# Patient Record
Sex: FEMALE | Race: WHITE | NOT HISPANIC OR LATINO | Employment: OTHER | URBAN - METROPOLITAN AREA
[De-identification: names, ages, dates, MRNs, and addresses within clinical notes are randomized per-mention and may not be internally consistent; named-entity substitution may affect disease eponyms.]

---

## 2017-04-27 ENCOUNTER — INFUSION (OUTPATIENT)
Dept: INFUSION THERAPY | Facility: HOSPITAL | Age: 77
End: 2017-04-27
Attending: INTERNAL MEDICINE
Payer: MEDICARE

## 2017-04-27 VITALS
DIASTOLIC BLOOD PRESSURE: 73 MMHG | SYSTOLIC BLOOD PRESSURE: 148 MMHG | HEIGHT: 66 IN | HEART RATE: 66 BPM | WEIGHT: 145 LBS | BODY MASS INDEX: 23.3 KG/M2

## 2017-04-27 DIAGNOSIS — R50.9 ACUTE FEBRILE ILLNESS: Primary | ICD-10-CM

## 2017-04-27 PROCEDURE — 96365 THER/PROPH/DIAG IV INF INIT: CPT

## 2017-04-27 PROCEDURE — 63600175 PHARM REV CODE 636 W HCPCS: Performed by: INTERNAL MEDICINE

## 2017-04-27 RX ADMIN — Medication 1000 MG: at 03:04

## 2017-10-25 ENCOUNTER — HOSPITAL ENCOUNTER (INPATIENT)
Facility: HOSPITAL | Age: 77
LOS: 2 days | Discharge: HOME | DRG: 871 | End: 2017-10-27
Attending: EMERGENCY MEDICINE | Admitting: HOSPITALIST
Payer: MEDICARE

## 2017-10-25 DIAGNOSIS — A41.9 SEVERE SEPSIS: ICD-10-CM

## 2017-10-25 DIAGNOSIS — J18.9 PNEUMONIA OF RIGHT LOWER LOBE DUE TO INFECTIOUS ORGANISM: Primary | ICD-10-CM

## 2017-10-25 DIAGNOSIS — T14.90XA TRAUMA: ICD-10-CM

## 2017-10-25 DIAGNOSIS — R65.20 SEVERE SEPSIS: ICD-10-CM

## 2017-10-25 DIAGNOSIS — R41.82 ALTERED MENTAL STATUS: ICD-10-CM

## 2017-10-25 PROBLEM — K21.9 GERD WITHOUT ESOPHAGITIS: Status: ACTIVE | Noted: 2017-10-25

## 2017-10-25 PROBLEM — G89.29 OTHER CHRONIC PAIN: Status: ACTIVE | Noted: 2017-10-25

## 2017-10-25 PROBLEM — F03.90 DEMENTIA WITHOUT BEHAVIORAL DISTURBANCE: Status: ACTIVE | Noted: 2017-10-25

## 2017-10-25 PROBLEM — I10 ESSENTIAL HYPERTENSION: Status: ACTIVE | Noted: 2017-10-25

## 2017-10-25 PROBLEM — Z72.0 TOBACCO ABUSE: Status: ACTIVE | Noted: 2017-10-25

## 2017-10-25 PROBLEM — G25.81 RESTLESS LEG SYNDROME, CONTROLLED: Status: ACTIVE | Noted: 2017-10-25

## 2017-10-25 LAB
ALBUMIN SERPL BCP-MCNC: 3.4 G/DL
ALP SERPL-CCNC: 84 U/L
ALT SERPL W/O P-5'-P-CCNC: 7 U/L
ANION GAP SERPL CALC-SCNC: 12 MMOL/L
AST SERPL-CCNC: 12 U/L
BACTERIA #/AREA URNS HPF: NORMAL /HPF
BASOPHILS # BLD AUTO: 0.01 K/UL
BASOPHILS NFR BLD: 0.2 %
BILIRUB SERPL-MCNC: 0.4 MG/DL
BILIRUB UR QL STRIP: NEGATIVE
BUN SERPL-MCNC: 26 MG/DL
CALCIUM SERPL-MCNC: 9.5 MG/DL
CHLORIDE SERPL-SCNC: 108 MMOL/L
CLARITY UR: CLEAR
CO2 SERPL-SCNC: 26 MMOL/L
COLOR UR: YELLOW
CREAT SERPL-MCNC: 0.8 MG/DL
DIFFERENTIAL METHOD: ABNORMAL
EOSINOPHIL # BLD AUTO: 0 K/UL
EOSINOPHIL NFR BLD: 0.3 %
ERYTHROCYTE [DISTWIDTH] IN BLOOD BY AUTOMATED COUNT: 13.7 %
EST. GFR  (AFRICAN AMERICAN): >60 ML/MIN/1.73 M^2
EST. GFR  (NON AFRICAN AMERICAN): >60 ML/MIN/1.73 M^2
GLUCOSE SERPL-MCNC: 128 MG/DL
GLUCOSE UR QL STRIP: NEGATIVE
HCT VFR BLD AUTO: 41.1 %
HGB BLD-MCNC: 13.8 G/DL
HGB UR QL STRIP: ABNORMAL
KETONES UR QL STRIP: NEGATIVE
LACTATE SERPL-SCNC: 2.8 MMOL/L
LEUKOCYTE ESTERASE UR QL STRIP: NEGATIVE
LYMPHOCYTES # BLD AUTO: 0.3 K/UL
LYMPHOCYTES NFR BLD: 5.6 %
MCH RBC QN AUTO: 29.5 PG
MCHC RBC AUTO-ENTMCNC: 33.6 G/DL
MCV RBC AUTO: 88 FL
MICROSCOPIC COMMENT: NORMAL
MONOCYTES # BLD AUTO: 0.5 K/UL
MONOCYTES NFR BLD: 9.4 %
NEUTROPHILS # BLD AUTO: 4.8 K/UL
NEUTROPHILS NFR BLD: 83.8 %
NITRITE UR QL STRIP: NEGATIVE
PH UR STRIP: 5 [PH] (ref 5–8)
PLATELET # BLD AUTO: 174 K/UL
PMV BLD AUTO: 10.4 FL
POTASSIUM SERPL-SCNC: 3.3 MMOL/L
PROT SERPL-MCNC: 6.8 G/DL
PROT UR QL STRIP: NEGATIVE
RBC # BLD AUTO: 4.68 M/UL
RBC #/AREA URNS HPF: 0 /HPF (ref 0–4)
SODIUM SERPL-SCNC: 146 MMOL/L
SP GR UR STRIP: 1.02 (ref 1–1.03)
SQUAMOUS #/AREA URNS HPF: 2 /HPF
TROPONIN I SERPL DL<=0.01 NG/ML-MCNC: 0.02 NG/ML
URN SPEC COLLECT METH UR: ABNORMAL
UROBILINOGEN UR STRIP-ACNC: NEGATIVE EU/DL
WBC # BLD AUTO: 5.76 K/UL
WBC #/AREA URNS HPF: 2 /HPF (ref 0–5)

## 2017-10-25 PROCEDURE — 84484 ASSAY OF TROPONIN QUANT: CPT

## 2017-10-25 PROCEDURE — 25000242 PHARM REV CODE 250 ALT 637 W/ HCPCS: Performed by: HOSPITALIST

## 2017-10-25 PROCEDURE — 99285 EMERGENCY DEPT VISIT HI MDM: CPT | Mod: 25

## 2017-10-25 PROCEDURE — 81000 URINALYSIS NONAUTO W/SCOPE: CPT

## 2017-10-25 PROCEDURE — 25000003 PHARM REV CODE 250: Performed by: EMERGENCY MEDICINE

## 2017-10-25 PROCEDURE — 80053 COMPREHEN METABOLIC PANEL: CPT

## 2017-10-25 PROCEDURE — 83605 ASSAY OF LACTIC ACID: CPT

## 2017-10-25 PROCEDURE — 96365 THER/PROPH/DIAG IV INF INIT: CPT

## 2017-10-25 PROCEDURE — 87040 BLOOD CULTURE FOR BACTERIA: CPT

## 2017-10-25 PROCEDURE — 85025 COMPLETE CBC W/AUTO DIFF WBC: CPT

## 2017-10-25 PROCEDURE — 96361 HYDRATE IV INFUSION ADD-ON: CPT

## 2017-10-25 PROCEDURE — 63600175 PHARM REV CODE 636 W HCPCS: Performed by: EMERGENCY MEDICINE

## 2017-10-25 PROCEDURE — 12000002 HC ACUTE/MED SURGE SEMI-PRIVATE ROOM

## 2017-10-25 PROCEDURE — A4216 STERILE WATER/SALINE, 10 ML: HCPCS | Performed by: EMERGENCY MEDICINE

## 2017-10-25 PROCEDURE — 25000003 PHARM REV CODE 250: Performed by: HOSPITALIST

## 2017-10-25 PROCEDURE — S4991 NICOTINE PATCH NONLEGEND: HCPCS | Performed by: HOSPITALIST

## 2017-10-25 RX ORDER — TRAZODONE HYDROCHLORIDE 50 MG/1
50 TABLET ORAL NIGHTLY
COMMUNITY
End: 2019-01-13

## 2017-10-25 RX ORDER — ENOXAPARIN SODIUM 100 MG/ML
40 INJECTION SUBCUTANEOUS EVERY 24 HOURS
Status: DISCONTINUED | OUTPATIENT
Start: 2017-10-25 | End: 2017-10-27 | Stop reason: HOSPADM

## 2017-10-25 RX ORDER — DONEPEZIL HYDROCHLORIDE 10 MG/1
10 TABLET, FILM COATED ORAL NIGHTLY
Status: DISCONTINUED | OUTPATIENT
Start: 2017-10-25 | End: 2017-10-27 | Stop reason: HOSPADM

## 2017-10-25 RX ORDER — TRAZODONE HYDROCHLORIDE 50 MG/1
50 TABLET ORAL NIGHTLY
Status: DISCONTINUED | OUTPATIENT
Start: 2017-10-25 | End: 2017-10-27 | Stop reason: HOSPADM

## 2017-10-25 RX ORDER — GLYCOPYRROLATE 1 MG/1
1 TABLET ORAL 3 TIMES DAILY
Status: ON HOLD | COMMUNITY
End: 2017-10-27 | Stop reason: HOSPADM

## 2017-10-25 RX ORDER — MORPHINE SULFATE 10 MG/ML
2 INJECTION INTRAMUSCULAR; INTRAVENOUS; SUBCUTANEOUS EVERY 4 HOURS PRN
Status: DISCONTINUED | OUTPATIENT
Start: 2017-10-25 | End: 2017-10-27 | Stop reason: HOSPADM

## 2017-10-25 RX ORDER — PANTOPRAZOLE SODIUM 40 MG/1
40 TABLET, DELAYED RELEASE ORAL DAILY
Status: DISCONTINUED | OUTPATIENT
Start: 2017-10-25 | End: 2017-10-27 | Stop reason: HOSPADM

## 2017-10-25 RX ORDER — IBUPROFEN 200 MG
1 TABLET ORAL DAILY
Status: DISCONTINUED | OUTPATIENT
Start: 2017-10-25 | End: 2017-10-27 | Stop reason: HOSPADM

## 2017-10-25 RX ORDER — CETIRIZINE HYDROCHLORIDE 10 MG/1
10 TABLET ORAL DAILY
Status: DISCONTINUED | OUTPATIENT
Start: 2017-10-25 | End: 2017-10-27 | Stop reason: HOSPADM

## 2017-10-25 RX ORDER — TIZANIDINE 4 MG/1
4 TABLET ORAL 3 TIMES DAILY PRN
Status: DISCONTINUED | OUTPATIENT
Start: 2017-10-25 | End: 2017-10-27 | Stop reason: HOSPADM

## 2017-10-25 RX ORDER — ROPINIROLE 0.25 MG/1
0.25 TABLET, FILM COATED ORAL 2 TIMES DAILY
Status: DISCONTINUED | OUTPATIENT
Start: 2017-10-25 | End: 2017-10-27 | Stop reason: HOSPADM

## 2017-10-25 RX ORDER — ALPRAZOLAM 0.5 MG/1
1 TABLET ORAL 2 TIMES DAILY
Status: DISCONTINUED | OUTPATIENT
Start: 2017-10-25 | End: 2017-10-27 | Stop reason: HOSPADM

## 2017-10-25 RX ORDER — POTASSIUM CHLORIDE 20 MEQ/1
40 TABLET, EXTENDED RELEASE ORAL ONCE
Status: COMPLETED | OUTPATIENT
Start: 2017-10-25 | End: 2017-10-25

## 2017-10-25 RX ORDER — CALCIUM CARBONATE 500(1250)
1 TABLET ORAL DAILY
COMMUNITY

## 2017-10-25 RX ORDER — ATORVASTATIN CALCIUM 10 MG/1
10 TABLET, FILM COATED ORAL DAILY
Status: DISCONTINUED | OUTPATIENT
Start: 2017-10-25 | End: 2017-10-27 | Stop reason: HOSPADM

## 2017-10-25 RX ORDER — AMLODIPINE BESYLATE 5 MG/1
5 TABLET ORAL DAILY
COMMUNITY
End: 2020-12-16 | Stop reason: CLARIF

## 2017-10-25 RX ORDER — ALENDRONATE SODIUM 70 MG/1
70 TABLET ORAL
COMMUNITY

## 2017-10-25 RX ORDER — FLUTICASONE PROPIONATE 50 MCG
1 SPRAY, SUSPENSION (ML) NASAL DAILY
Status: DISCONTINUED | OUTPATIENT
Start: 2017-10-25 | End: 2017-10-27 | Stop reason: HOSPADM

## 2017-10-25 RX ORDER — LORATADINE 10 MG/1
10 TABLET ORAL DAILY
COMMUNITY
End: 2020-11-09 | Stop reason: CLARIF

## 2017-10-25 RX ORDER — SODIUM CHLORIDE 0.9 % (FLUSH) 0.9 %
3 SYRINGE (ML) INJECTION EVERY 8 HOURS
Status: DISCONTINUED | OUTPATIENT
Start: 2017-10-25 | End: 2017-10-27 | Stop reason: HOSPADM

## 2017-10-25 RX ORDER — ESCITALOPRAM OXALATE 10 MG/1
10 TABLET ORAL DAILY
Status: DISCONTINUED | OUTPATIENT
Start: 2017-10-25 | End: 2017-10-27 | Stop reason: HOSPADM

## 2017-10-25 RX ORDER — CALCIUM CARBONATE 500(1250)
500 TABLET ORAL DAILY
Status: DISCONTINUED | OUTPATIENT
Start: 2017-10-25 | End: 2017-10-27 | Stop reason: HOSPADM

## 2017-10-25 RX ORDER — HYDROCODONE BITARTRATE AND ACETAMINOPHEN 10; 325 MG/1; MG/1
1 TABLET ORAL EVERY 4 HOURS PRN
Status: DISCONTINUED | OUTPATIENT
Start: 2017-10-25 | End: 2017-10-27 | Stop reason: HOSPADM

## 2017-10-25 RX ORDER — MEMANTINE HYDROCHLORIDE 5 MG/1
5 TABLET ORAL DAILY
Status: DISCONTINUED | OUTPATIENT
Start: 2017-10-25 | End: 2017-10-27 | Stop reason: HOSPADM

## 2017-10-25 RX ORDER — IBUPROFEN 400 MG/1
400 TABLET ORAL EVERY 6 HOURS PRN
Status: DISCONTINUED | OUTPATIENT
Start: 2017-10-25 | End: 2017-10-27 | Stop reason: HOSPADM

## 2017-10-25 RX ORDER — ATORVASTATIN CALCIUM 10 MG/1
20 TABLET, FILM COATED ORAL DAILY
COMMUNITY

## 2017-10-25 RX ORDER — ACETAMINOPHEN 500 MG
1000 TABLET ORAL
Status: COMPLETED | OUTPATIENT
Start: 2017-10-25 | End: 2017-10-25

## 2017-10-25 RX ORDER — IBUPROFEN 800 MG/1
800 TABLET ORAL EVERY 6 HOURS PRN
Status: ON HOLD | COMMUNITY
End: 2017-10-27 | Stop reason: HOSPADM

## 2017-10-25 RX ORDER — GABAPENTIN 300 MG/1
300 CAPSULE ORAL DAILY
Status: DISCONTINUED | OUTPATIENT
Start: 2017-10-25 | End: 2017-10-27 | Stop reason: HOSPADM

## 2017-10-25 RX ORDER — FLUTICASONE PROPIONATE 50 MCG
1 SPRAY, SUSPENSION (ML) NASAL DAILY
COMMUNITY
End: 2020-11-09 | Stop reason: CLARIF

## 2017-10-25 RX ADMIN — CALCIUM 500 MG: 500 TABLET ORAL at 03:10

## 2017-10-25 RX ADMIN — NICOTINE 1 PATCH: 21 PATCH, EXTENDED RELEASE TRANSDERMAL at 03:10

## 2017-10-25 RX ADMIN — POTASSIUM CHLORIDE 40 MEQ: 1500 TABLET, EXTENDED RELEASE ORAL at 03:10

## 2017-10-25 RX ADMIN — ACETAMINOPHEN 1000 MG: 500 TABLET ORAL at 10:10

## 2017-10-25 RX ADMIN — ESCITALOPRAM OXALATE 10 MG: 10 TABLET ORAL at 03:10

## 2017-10-25 RX ADMIN — CETIRIZINE HYDROCHLORIDE 10 MG: 10 TABLET, FILM COATED ORAL at 03:10

## 2017-10-25 RX ADMIN — TRAZODONE HYDROCHLORIDE 50 MG: 50 TABLET ORAL at 09:10

## 2017-10-25 RX ADMIN — GABAPENTIN 300 MG: 300 CAPSULE ORAL at 03:10

## 2017-10-25 RX ADMIN — ALPRAZOLAM 1 MG: 0.5 TABLET ORAL at 09:10

## 2017-10-25 RX ADMIN — DONEPEZIL HYDROCHLORIDE 10 MG: 10 TABLET, FILM COATED ORAL at 09:10

## 2017-10-25 RX ADMIN — ENOXAPARIN SODIUM 40 MG: 100 INJECTION SUBCUTANEOUS at 04:10

## 2017-10-25 RX ADMIN — ROPINIROLE HYDROCHLORIDE 0.25 MG: 0.25 TABLET, FILM COATED ORAL at 09:10

## 2017-10-25 RX ADMIN — HYDROCODONE BITARTRATE AND ACETAMINOPHEN 1 TABLET: 10; 325 TABLET ORAL at 03:10

## 2017-10-25 RX ADMIN — MEMANTINE HYDROCHLORIDE 5 MG: 5 TABLET ORAL at 03:10

## 2017-10-25 RX ADMIN — ATORVASTATIN CALCIUM 10 MG: 10 TABLET, FILM COATED ORAL at 03:10

## 2017-10-25 RX ADMIN — PANTOPRAZOLE SODIUM 40 MG: 40 TABLET, DELAYED RELEASE ORAL at 03:10

## 2017-10-25 RX ADMIN — SODIUM CHLORIDE 1000 ML: 0.9 INJECTION, SOLUTION INTRAVENOUS at 10:10

## 2017-10-25 RX ADMIN — CEFTRIAXONE 1 G: 1 INJECTION, SOLUTION INTRAVENOUS at 11:10

## 2017-10-25 RX ADMIN — SODIUM CHLORIDE, PRESERVATIVE FREE 3 ML: 5 INJECTION INTRAVENOUS at 09:10

## 2017-10-25 RX ADMIN — AZITHROMYCIN 500 MG: 500 INJECTION, POWDER, LYOPHILIZED, FOR SOLUTION INTRAVENOUS at 11:10

## 2017-10-25 RX ADMIN — FLUTICASONE PROPIONATE 1 SPRAY: 50 SPRAY, METERED NASAL at 03:10

## 2017-10-25 RX ADMIN — SODIUM CHLORIDE, PRESERVATIVE FREE 3 ML: 5 INJECTION INTRAVENOUS at 03:10

## 2017-10-25 NOTE — PLAN OF CARE
10/25/17 1732   Discharge Assessment   Assessment Type Discharge Planning Assessment   Confirmed/corrected address and phone number on facesheet? Yes   Assessment information obtained from? Patient   Prior to hospitilization cognitive status: Alert/Oriented   Prior to hospitalization functional status: Needs Assistance  (Patient is Blind)   Current cognitive status: Alert/Oriented   Current Functional Status: Needs Assistance;Assistive Equipment   Lives With spouse  (sister-in-law)   Able to Return to Prior Arrangements yes   Is patient able to care for self after discharge? Yes   Who are your caregiver(s) and their phone number(s)? Carlie Rodriguez and Spouse Tim Fallon   Patient's perception of discharge disposition home or selfcare   Readmission Within The Last 30 Days no previous admission in last 30 days   Patient currently being followed by outpatient case management? No   Patient currently receives any other outside agency services? No   Equipment Currently Used at Home none   Do you have any problems affording any of your prescribed medications? Yes   Is the patient taking medications as prescribed? yes   Does the patient have transportation home? Yes   Transportation Available family or friend will provide   Does the patient receive services at the [facility]? No   Discharge Plan A Home with family   Discharge Plan B Home with family   Patient/Family In Agreement With Plan yes     PCP: Willa Friedman    Patient prefers afternoon appointments.

## 2017-10-25 NOTE — ASSESSMENT & PLAN NOTE
Influenza swab  Appears to be secondary to pneumonia  Difficult to decipher if neck pain is anything but old, chronic findings, if neuro status does not improve and fever persistent will consider LP  Await urine and blood culture   IV antibiotics

## 2017-10-25 NOTE — SUBJECTIVE & OBJECTIVE
Past Medical History:   Diagnosis Date    Depression     GERD (gastroesophageal reflux disease)     History of radiofrequency ablation procedure for cardiac arrhythmia     Hypertension     Memory loss     Osteoporosis     Palpitations        Past Surgical History:   Procedure Laterality Date    APPENDECTOMY       SECTION      EYE SURGERY      HIP SURGERY      HYSTERECTOMY      ROTATOR CUFF REPAIR      L arm    TONSILLECTOMY         Review of patient's allergies indicates:  No Known Allergies    No current facility-administered medications on file prior to encounter.      Current Outpatient Prescriptions on File Prior to Encounter   Medication Sig    donepezil (ARICEPT) 10 MG tablet Take 10 mg by mouth every evening.    gabapentin (NEURONTIN) 100 MG capsule Take 300 mg by mouth once daily.     hydrocodone-acetaminophen 10-325mg (NORCO)  mg Tab Take by mouth.    omeprazole (PRILOSEC) 20 MG capsule Take 20 mg by mouth once daily.    ropinirole (REQUIP) 0.25 MG tablet Take 0.25 mg by mouth 2 (two) times daily.    tizanidine (ZANAFLEX) 4 MG tablet Take 4 mg by mouth every 6 (six) hours as needed.    alprazolam (XANAX) 1 MG tablet Take 1 mg by mouth 2 (two) times daily.    ergocalciferol (ERGOCALCIFEROL) 50,000 unit Cap Take 50,000 Units by mouth every 7 days.    escitalopram oxalate (LEXAPRO) 10 MG tablet Take 10 mg by mouth once daily.    memantine (NAMENDA) 5 MG Tab Take 5 mg by mouth once daily.    metoprolol succinate (TOPROL-XL) 25 MG 24 hr tablet Take 25 mg by mouth once daily.    oxybutynin (DITROPAN) 5 MG Tab Take 5 mg by mouth 2 (two) times daily.     Family History     None        Social History Main Topics    Smoking status: Current Every Day Smoker     Packs/day: 0.50     Types: Cigarettes    Smokeless tobacco: Never Used    Alcohol use No    Drug use: No    Sexual activity: Not on file     Review of Systems   Constitutional: Positive for activity change, appetite  change and fatigue.   HENT: Negative.    Eyes: Positive for visual disturbance.   Respiratory: Negative.    Cardiovascular: Negative.    Gastrointestinal: Positive for nausea and vomiting.   Endocrine: Negative.    Genitourinary: Negative.    Musculoskeletal: Positive for arthralgias, back pain and neck pain.   Skin: Negative.    Neurological: Positive for dizziness, speech difficulty, weakness and light-headedness.   Psychiatric/Behavioral: Positive for confusion.     Objective:     Vital Signs (Most Recent):  Temp: 99 °F (37.2 °C) (10/25/17 1325)  Pulse: 77 (10/25/17 1325)  Resp: 18 (10/25/17 1325)  BP: (!) 117/57 (10/25/17 1325)  SpO2: (!) 93 % (10/25/17 1325) Vital Signs (24h Range):  Temp:  [99 °F (37.2 °C)-101.8 °F (38.8 °C)] 99 °F (37.2 °C)  Pulse:  [] 77  Resp:  [14-18] 18  SpO2:  [93 %-97 %] 93 %  BP: ()/(55-76) 117/57     Weight: 62.7 kg (138 lb 3.7 oz)  Body mass index is 22.31 kg/m².    Physical Exam   Constitutional: She is oriented to person, place, and time. She appears well-developed and well-nourished. No distress.   HENT:   Head: Normocephalic and atraumatic.   Mouth/Throat: Oropharynx is clear and moist.   Eyes: Right eye exhibits no discharge. Left eye exhibits no discharge.   Missing left eye, right eye poor visual acuity   Neck: Normal range of motion. Neck supple. No JVD present.   Cardiovascular: Normal rate, regular rhythm and intact distal pulses.    Murmur heard.  Pulmonary/Chest: Effort normal. No respiratory distress. She has no wheezes.   Rhonchi mid-lower lobe on right    Abdominal: Soft. Bowel sounds are normal. She exhibits no distension. There is no tenderness.   Musculoskeletal: Normal range of motion. She exhibits deformity. She exhibits no edema.   Lymphadenopathy:     She has no cervical adenopathy.   Neurological: She is alert and oriented to person, place, and time.   Skin: Skin is warm and dry. Capillary refill takes less than 2 seconds. No erythema.    Psychiatric: She has a normal mood and affect. Her behavior is normal.        Significant Labs:   Blood Culture: No results for input(s): LABBLOO in the last 48 hours.  BMP:   Recent Labs  Lab 10/25/17  1002   *   *   K 3.3*      CO2 26   BUN 26*   CREATININE 0.8   CALCIUM 9.5     CMP:   Recent Labs  Lab 10/25/17  1002   *   K 3.3*      CO2 26   *   BUN 26*   CREATININE 0.8   CALCIUM 9.5   PROT 6.8   ALBUMIN 3.4*   BILITOT 0.4   ALKPHOS 84   AST 12   ALT 7*   ANIONGAP 12   EGFRNONAA >60     Lactic Acid:   Recent Labs  Lab 10/25/17  1002   LACTATE 2.8*     Urine Culture: No results for input(s): LABURIN in the last 48 hours.  Urine Studies:   Recent Labs  Lab 10/25/17  1147   COLORU Yellow   APPEARANCEUA Clear   PHUR 5.0   SPECGRAV 1.020   PROTEINUA Negative   GLUCUA Negative   KETONESU Negative   BILIRUBINUA Negative   OCCULTUA 1+*   NITRITE Negative   UROBILINOGEN Negative   LEUKOCYTESUR Negative   RBCUA 0   WBCUA 2   BACTERIA Rare   SQUAMEPITHEL 2       Significant Imaging: I have reviewed all pertinent imaging results/findings within the past 24 hours.

## 2017-10-25 NOTE — HPI
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 It was noted by  that she could not hold herself up in bathroom last night and then found slumped in chair this morning. On admit she had temp 101.8F, , lactic acid 2.8 and confusion. She had one episode of emesis this morning. Patient denies fever, chill, cough, dysuria, diarrhea and abdominal pain. She does endorse neck pain 10/10 after fall but no evidence of fracture on c-spine CT. Head CT did not reveal acute intracranial process. CXR significant for probable RLL pneumonia. She was admitted with IV antibiotics and await cultures.

## 2017-10-25 NOTE — ASSESSMENT & PLAN NOTE
Pt family reports chronic pain in back, neck and shoulders  -on lortab, zanafelx and neurontin   PT/OT for dc planning

## 2017-10-25 NOTE — ED TRIAGE NOTES
Pt reports to the ED via EMS with c/o neck pain, nausea and vomiting and generalized weakness. Pt  reports pt became very confused last night, weak with slurred speech. Pt reports dizziness this morning and last night. Pt placed on cardiac monitor. 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

## 2017-10-25 NOTE — H&P
[facility] Hospital Medicine  History & Physical    Patient Name: Annette Fallon  MRN: [id number]  Admission Date: 10/25/2017  Attending Physician: Kassy Downs MD   Primary Care Provider: Valerie Jerry MD         Patient information was obtained from patient, relative(s) and ER records.     Subjective:     Principal Problem:Pneumonia of right lower lobe due to infectious organism    Chief Complaint:   Chief Complaint   Patient presents with    Fatigue     increasing weakness, slurred speech, confusion, Nausea and emesis since last night per spouse. Pt is Blind. + posterior neck pain since this morning-10/10.         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 It was noted by  that she could not hold herself up in bathroom last night and then found slumped in chair this morning. On admit she had temp 101.8F, , lactic acid 2.8 and confusion. She had one episode of emesis this morning. Patient denies fever, chill, cough, dysuria, diarrhea and abdominal pain. She does endorse neck pain 10/10 after fall but no evidence of fracture on c-spine CT. Head CT did not reveal acute intracranial process. CXR significant for probable RLL pneumonia. She was admitted with IV antibiotics and await cultures.     Past Medical History:   Diagnosis Date    Depression     GERD (gastroesophageal reflux disease)     History of radiofrequency ablation procedure for cardiac arrhythmia     Hypertension     Memory loss     Osteoporosis     Palpitations        Past Surgical History:   Procedure Laterality Date    APPENDECTOMY       SECTION      EYE SURGERY      HIP SURGERY      HYSTERECTOMY      ROTATOR CUFF REPAIR      L arm    TONSILLECTOMY         Review of patient's  allergies indicates:  No Known Allergies    No current facility-administered medications on file prior to encounter.      Current Outpatient Prescriptions on File Prior to Encounter   Medication Sig    donepezil (ARICEPT) 10 MG tablet Take 10 mg by mouth every evening.    gabapentin (NEURONTIN) 100 MG capsule Take 300 mg by mouth once daily.     hydrocodone-acetaminophen 10-325mg (NORCO)  mg Tab Take by mouth.    omeprazole (PRILOSEC) 20 MG capsule Take 20 mg by mouth once daily.    ropinirole (REQUIP) 0.25 MG tablet Take 0.25 mg by mouth 2 (two) times daily.    tizanidine (ZANAFLEX) 4 MG tablet Take 4 mg by mouth every 6 (six) hours as needed.    alprazolam (XANAX) 1 MG tablet Take 1 mg by mouth 2 (two) times daily.    ergocalciferol (ERGOCALCIFEROL) 50,000 unit Cap Take 50,000 Units by mouth every 7 days.    escitalopram oxalate (LEXAPRO) 10 MG tablet Take 10 mg by mouth once daily.    memantine (NAMENDA) 5 MG Tab Take 5 mg by mouth once daily.    metoprolol succinate (TOPROL-XL) 25 MG 24 hr tablet Take 25 mg by mouth once daily.    oxybutynin (DITROPAN) 5 MG Tab Take 5 mg by mouth 2 (two) times daily.     Family History     None        Social History Main Topics    Smoking status: Current Every Day Smoker     Packs/day: 0.50     Types: Cigarettes    Smokeless tobacco: Never Used    Alcohol use No    Drug use: No    Sexual activity: Not on file     Review of Systems   Constitutional: Positive for activity change, appetite change and fatigue.   HENT: Negative.    Eyes: Positive for visual disturbance.   Respiratory: Negative.    Cardiovascular: Negative.    Gastrointestinal: Positive for nausea and vomiting.   Endocrine: Negative.    Genitourinary: Negative.    Musculoskeletal: Positive for arthralgias, back pain and neck pain.   Skin: Negative.    Neurological: Positive for dizziness, speech difficulty, weakness and light-headedness.   Psychiatric/Behavioral: Positive for confusion.      Objective:     Vital Signs (Most Recent):  Temp: 99 °F (37.2 °C) (10/25/17 1325)  Pulse: 77 (10/25/17 1325)  Resp: 18 (10/25/17 1325)  BP: (!) 117/57 (10/25/17 1325)  SpO2: (!) 93 % (10/25/17 1325) Vital Signs (24h Range):  Temp:  [99 °F (37.2 °C)-101.8 °F (38.8 °C)] 99 °F (37.2 °C)  Pulse:  [] 77  Resp:  [14-18] 18  SpO2:  [93 %-97 %] 93 %  BP: ()/(55-76) 117/57     Weight: 62.7 kg (138 lb 3.7 oz)  Body mass index is 22.31 kg/m².    Physical Exam   Constitutional: She is oriented to person, place, and time. She appears well-developed and well-nourished. No distress.   HENT:   Head: Normocephalic and atraumatic.   Mouth/Throat: Oropharynx is clear and moist.   Eyes: Right eye exhibits no discharge. Left eye exhibits no discharge.   Missing left eye, right eye poor visual acuity   Neck: Normal range of motion. Neck supple. No JVD present.   Cardiovascular: Normal rate, regular rhythm and intact distal pulses.    Murmur heard.  Pulmonary/Chest: Effort normal. No respiratory distress. She has no wheezes.   Rhonchi mid-lower lobe on right    Abdominal: Soft. Bowel sounds are normal. She exhibits no distension. There is no tenderness.   Musculoskeletal: Normal range of motion. She exhibits deformity. She exhibits no edema.   Lymphadenopathy:     She has no cervical adenopathy.   Neurological: She is alert and oriented to person, place, and time.   Skin: Skin is warm and dry. Capillary refill takes less than 2 seconds. No erythema.   Psychiatric: She has a normal mood and affect. Her behavior is normal.        Significant Labs:   Blood Culture: No results for input(s): LABBLOO in the last 48 hours.  BMP:   Recent Labs  Lab 10/25/17  1002   *   *   K 3.3*      CO2 26   BUN 26*   CREATININE 0.8   CALCIUM 9.5     CMP:   Recent Labs  Lab 10/25/17  1002   *   K 3.3*      CO2 26   *   BUN 26*   CREATININE 0.8   CALCIUM 9.5   PROT 6.8   ALBUMIN 3.4*   BILITOT 0.4   ALKPHOS 84    AST 12   ALT 7*   ANIONGAP 12   EGFRNONAA >60     Lactic Acid:   Recent Labs  Lab 10/25/17  1002   LACTATE 2.8*     Urine Culture: No results for input(s): LABURIN in the last 48 hours.  Urine Studies:   Recent Labs  Lab 10/25/17  1147   COLORU Yellow   APPEARANCEUA Clear   PHUR 5.0   SPECGRAV 1.020   PROTEINUA Negative   GLUCUA Negative   KETONESU Negative   BILIRUBINUA Negative   OCCULTUA 1+*   NITRITE Negative   UROBILINOGEN Negative   LEUKOCYTESUR Negative   RBCUA 0   WBCUA 2   BACTERIA Rare   SQUAMEPITHEL 2       Significant Imaging: I have reviewed all pertinent imaging results/findings within the past 24 hours.    Assessment/Plan:     * Pneumonia of right lower lobe due to infectious organism    Rocephin and azithromycin   Supportive care            Severe sepsis    Influenza swab  Appears to be secondary to pneumonia  Difficult to decipher if neck pain is anything but old, chronic findings, if neuro status does not improve and fever persistent will consider LP  Await urine and blood culture   IV antibiotics           GERD without esophagitis    PPI daily         Restless leg syndrome, controlled    Resume requip          Other chronic pain    Pt family reports chronic pain in back, neck and shoulders  -on lortab, zanafelx and neurontin   PT/OT for dc planning             Dementia without behavioral disturbance    Resume aricept and nemenda   no acute issues           Tobacco abuse    Smoking cessation d/w patient  Nicotine patch offered           Essential hypertension    Bp in normal range   Hold norvasc and resume metoprolol with holding parameters              VTE Risk Mitigation         Ordered     enoxaparin injection 40 mg  Daily     Route:  Subcutaneous        10/25/17 1332     Medium Risk of VTE  Once      10/25/17 1332             Kassy Downs MD  Department of Hospital Medicine   [facility]

## 2017-10-25 NOTE — ED PROVIDER NOTES
"Encounter Date: 10/25/2017    SCRIBE #1 NOTE: I, Brad Land, am scribing for, and in the presence of,  Fritz Adams MD. I have scribed the following portions of the note - Other sections scribed: HPI, ROS, PE.       History     Chief Complaint   Patient presents with    Fatigue     increasing weakness, slurred speech, confusion, Nausea and emesis since last night per spouse. Pt is Blind. + posterior neck pain since this morning-10/10.      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 This history is provided by the pt's . Pt's sister found pt slumped in her chair today and pt was unable to speak.  reports she began shaking upon his arrival.     Pt otherwise reports that she slid but does not recall if she injured any part of her body. She does report posterior neck pain and left sided knee pain that began after the fall. She denies cough, dysuria, sores, and other associated symptoms.         The history is provided by the patient and the spouse. No  was used.     Review of patient's allergies indicates:  No Known Allergies  Past Medical History:   Diagnosis Date    Depression     GERD (gastroesophageal reflux disease)     History of radiofrequency ablation procedure for cardiac arrhythmia     Hypertension     Memory loss     Osteoporosis     Palpitations      Past Surgical History:   Procedure Laterality Date    APPENDECTOMY       SECTION      EYE SURGERY      HIP SURGERY      HYSTERECTOMY      ROTATOR CUFF REPAIR      L arm    TONSILLECTOMY   "     History reviewed. No pertinent family history.  Social History   Substance Use Topics    Smoking status: Current Every Day Smoker     Packs/day: 0.50     Types: Cigarettes    Smokeless tobacco: Never Used    Alcohol use No     Review of Systems   Constitutional: Positive for fever.   HENT: Negative for sore throat.    Respiratory: Negative for cough and shortness of breath.    Cardiovascular: Negative for chest pain.   Gastrointestinal: Negative for nausea.   Genitourinary: Negative for dysuria.   Musculoskeletal: Positive for neck pain (chronic; exacerbated since slip and fall yesterday). Negative for back pain.        (+) left knee pain   Skin: Negative for rash.   Neurological: Positive for speech difficulty (slurred words per ) and weakness.   Hematological: Does not bruise/bleed easily.       Physical Exam     Initial Vitals [10/25/17 0948]   BP Pulse Resp Temp SpO2   122/76 (!) 114 18 (!) 101.8 °F (38.8 °C) 97 %      MAP       91.33         Physical Exam    Nursing note and vitals reviewed.  Constitutional: She appears well-developed and well-nourished.   HENT:   Head: Normocephalic and atraumatic.   Nose: Nose normal.   Eyes: EOM and lids are normal.   Neck: Neck supple.   Pulmonary/Chest: No respiratory distress. She has decreased breath sounds in the right lower field. She has rhonchi (right lower lobe).   Abdominal: Normal appearance.   Musculoskeletal:        Left knee: She exhibits decreased range of motion. Tenderness (with palpation) found.   LLE   NVI    Neurological: She is alert.   Skin: Skin is warm and dry.   Psychiatric: She has a normal mood and affect. Her behavior is normal. Thought content normal.         ED Course   Procedures  Labs Reviewed - No data to display          Medical Decision Making:   Differential Diagnosis:   77 year old white female with a history of herniated disc in her neck chronic neck pain positive tobacco use hypertension brought to the ER by her   for altered mental status here she was found to have a temperature of 101.8 pulse of 114 respiratory rate of 20-89% sat on room air which increases to 94% on 2 L O2 nasal cannula blood pressure 122/76 rhonchi present right lower lobe on her lung exam patient here is alert and oriented ×3 moving all extremities well and answers questions properly white blood cell count 5.8 likely S2.8 chest x-ray shows right lower lobe pneumonia EKG no ischemic changes troponin negative CT scan of the head and CT scan of the cervical spine negative for any acute process per radiologist the patient was given 1 L normal saline IV bolus 2 blood cultures were done she was given 1 g of Rocephin  mg of Zithromax IV she was admitted to the hospitalist service for pneumonia and sepsis she is hemodynamically stable at the time of admission            Scribe Attestation:   Scribe #1: I performed the above scribed service and the documentation accurately describes the services I performed. I attest to the accuracy of the note.    Attending Attestation:           Physician Attestation for Scribe:  Physician Attestation Statement for Scribe #1: I, Fritz Adams MD, reviewed documentation, as scribed by Brad Land in my presence, and it is both accurate and complete.                 ED Course      Clinical Impression:   There were no encounter diagnoses.                           Fritz Adams MD  10/25/17 [id number]

## 2017-10-26 LAB
ANION GAP SERPL CALC-SCNC: 4 MMOL/L
BASOPHILS # BLD AUTO: 0.01 K/UL
BASOPHILS NFR BLD: 0.1 %
BUN SERPL-MCNC: 23 MG/DL
CALCIUM SERPL-MCNC: 9.1 MG/DL
CHLORIDE SERPL-SCNC: 108 MMOL/L
CO2 SERPL-SCNC: 33 MMOL/L
CREAT SERPL-MCNC: 0.6 MG/DL
DIFFERENTIAL METHOD: ABNORMAL
EOSINOPHIL # BLD AUTO: 0 K/UL
EOSINOPHIL NFR BLD: 0.2 %
ERYTHROCYTE [DISTWIDTH] IN BLOOD BY AUTOMATED COUNT: 13.7 %
EST. GFR  (AFRICAN AMERICAN): >60 ML/MIN/1.73 M^2
EST. GFR  (NON AFRICAN AMERICAN): >60 ML/MIN/1.73 M^2
GLUCOSE SERPL-MCNC: 95 MG/DL
HCT VFR BLD AUTO: 37.3 %
HGB BLD-MCNC: 12.3 G/DL
LYMPHOCYTES # BLD AUTO: 1.1 K/UL
LYMPHOCYTES NFR BLD: 12.6 %
MAGNESIUM SERPL-MCNC: 1.8 MG/DL
MCH RBC QN AUTO: 29.2 PG
MCHC RBC AUTO-ENTMCNC: 33 G/DL
MCV RBC AUTO: 89 FL
MONOCYTES # BLD AUTO: 0.7 K/UL
MONOCYTES NFR BLD: 7.6 %
NEUTROPHILS # BLD AUTO: 6.8 K/UL
NEUTROPHILS NFR BLD: 79.5 %
PHOSPHATE SERPL-MCNC: 2.1 MG/DL
PLATELET # BLD AUTO: 148 K/UL
PMV BLD AUTO: 10.4 FL
POTASSIUM SERPL-SCNC: 4.6 MMOL/L
RBC # BLD AUTO: 4.21 M/UL
SODIUM SERPL-SCNC: 145 MMOL/L
WBC # BLD AUTO: 8.51 K/UL

## 2017-10-26 PROCEDURE — 27000221 HC OXYGEN, UP TO 24 HOURS

## 2017-10-26 PROCEDURE — 84100 ASSAY OF PHOSPHORUS: CPT

## 2017-10-26 PROCEDURE — 83735 ASSAY OF MAGNESIUM: CPT

## 2017-10-26 PROCEDURE — 36415 COLL VENOUS BLD VENIPUNCTURE: CPT

## 2017-10-26 PROCEDURE — 25000003 PHARM REV CODE 250: Performed by: EMERGENCY MEDICINE

## 2017-10-26 PROCEDURE — 85025 COMPLETE CBC W/AUTO DIFF WBC: CPT

## 2017-10-26 PROCEDURE — A4216 STERILE WATER/SALINE, 10 ML: HCPCS | Performed by: EMERGENCY MEDICINE

## 2017-10-26 PROCEDURE — 80048 BASIC METABOLIC PNL TOTAL CA: CPT

## 2017-10-26 PROCEDURE — 94761 N-INVAS EAR/PLS OXIMETRY MLT: CPT

## 2017-10-26 PROCEDURE — 12000002 HC ACUTE/MED SURGE SEMI-PRIVATE ROOM

## 2017-10-26 PROCEDURE — 63600175 PHARM REV CODE 636 W HCPCS: Performed by: EMERGENCY MEDICINE

## 2017-10-26 PROCEDURE — S4991 NICOTINE PATCH NONLEGEND: HCPCS | Performed by: HOSPITALIST

## 2017-10-26 PROCEDURE — 25000003 PHARM REV CODE 250: Performed by: HOSPITALIST

## 2017-10-26 RX ORDER — SODIUM,POTASSIUM PHOSPHATES 280-250MG
2 POWDER IN PACKET (EA) ORAL ONCE
Status: COMPLETED | OUTPATIENT
Start: 2017-10-26 | End: 2017-10-26

## 2017-10-26 RX ADMIN — DONEPEZIL HYDROCHLORIDE 10 MG: 10 TABLET, FILM COATED ORAL at 09:10

## 2017-10-26 RX ADMIN — POTASSIUM & SODIUM PHOSPHATES POWDER PACK 280-160-250 MG 2 PACKET: 280-160-250 PACK at 05:10

## 2017-10-26 RX ADMIN — ALPRAZOLAM 1 MG: 0.5 TABLET ORAL at 09:10

## 2017-10-26 RX ADMIN — AZITHROMYCIN MONOHYDRATE 500 MG: 500 INJECTION, POWDER, LYOPHILIZED, FOR SOLUTION INTRAVENOUS at 11:10

## 2017-10-26 RX ADMIN — MEMANTINE HYDROCHLORIDE 5 MG: 5 TABLET ORAL at 09:10

## 2017-10-26 RX ADMIN — NICOTINE 1 PATCH: 21 PATCH, EXTENDED RELEASE TRANSDERMAL at 09:10

## 2017-10-26 RX ADMIN — SODIUM CHLORIDE, PRESERVATIVE FREE 3 ML: 5 INJECTION INTRAVENOUS at 09:10

## 2017-10-26 RX ADMIN — FLUTICASONE PROPIONATE 1 SPRAY: 50 SPRAY, METERED NASAL at 09:10

## 2017-10-26 RX ADMIN — ATORVASTATIN CALCIUM 10 MG: 10 TABLET, FILM COATED ORAL at 09:10

## 2017-10-26 RX ADMIN — CEFTRIAXONE 1 G: 1 INJECTION, SOLUTION INTRAVENOUS at 11:10

## 2017-10-26 RX ADMIN — CALCIUM 500 MG: 500 TABLET ORAL at 09:10

## 2017-10-26 RX ADMIN — HYDROCODONE BITARTRATE AND ACETAMINOPHEN 1 TABLET: 10; 325 TABLET ORAL at 06:10

## 2017-10-26 RX ADMIN — ESCITALOPRAM OXALATE 10 MG: 10 TABLET ORAL at 09:10

## 2017-10-26 RX ADMIN — TRAZODONE HYDROCHLORIDE 50 MG: 50 TABLET ORAL at 09:10

## 2017-10-26 RX ADMIN — HYDROCODONE BITARTRATE AND ACETAMINOPHEN 1 TABLET: 10; 325 TABLET ORAL at 05:10

## 2017-10-26 RX ADMIN — ROPINIROLE HYDROCHLORIDE 0.25 MG: 0.25 TABLET, FILM COATED ORAL at 09:10

## 2017-10-26 RX ADMIN — SODIUM CHLORIDE, PRESERVATIVE FREE 3 ML: 5 INJECTION INTRAVENOUS at 05:10

## 2017-10-26 RX ADMIN — CETIRIZINE HYDROCHLORIDE 10 MG: 10 TABLET, FILM COATED ORAL at 09:10

## 2017-10-26 RX ADMIN — ENOXAPARIN SODIUM 40 MG: 100 INJECTION SUBCUTANEOUS at 05:10

## 2017-10-26 RX ADMIN — GABAPENTIN 300 MG: 300 CAPSULE ORAL at 09:10

## 2017-10-26 RX ADMIN — PANTOPRAZOLE SODIUM 40 MG: 40 TABLET, DELAYED RELEASE ORAL at 09:10

## 2017-10-26 RX ADMIN — HYDROCODONE BITARTRATE AND ACETAMINOPHEN 1 TABLET: 10; 325 TABLET ORAL at 11:10

## 2017-10-26 NOTE — PLAN OF CARE
Problem: Patient Care Overview  Goal: Plan of Care Review  Outcome: Ongoing (interventions implemented as appropriate)  Patient is A/Ox4, remains free from falls, is afebrile and states chronic back and neck pain is being moderately managed with ordered pain medicine.  Patient states no SOB, and states no breathing difficulty after ambulating to the restroom with assistance.  Patient has good appetite, is tolerating IV and PO medicines well.  Will continue to monitor patient for pain, safety, IV antibiotic tolerance ans s/s's of infection.

## 2017-10-26 NOTE — PLAN OF CARE
Problem: Patient Care Overview  Goal: Plan of Care Review  Outcome: Ongoing (interventions implemented as appropriate)  A+OX4. Pt free from falls, injury, and trauma. VSS. Denies any pain. Medications given. No acute distress noted. Will continue to monitor.

## 2017-10-26 NOTE — PROGRESS NOTES
ARIANA f/u with patient with family at the bedside.  Patient from home with spouse and  was independent.  Patient denies having any medical equipment at home. Patient's sister in law, Carlie Rodriguez ([phone]) assists with ADLs and with transportation to doctor appts.  Patient prefers afternoon appts and prefers to use [facility] on [address].  Patient is visually impaired.   Discharge disposition is to home.    Grazyna Khan LMSW, RIGO-[place], [facility]  10/26/2017

## 2017-10-27 VITALS
TEMPERATURE: 97 F | WEIGHT: 138.25 LBS | DIASTOLIC BLOOD PRESSURE: 59 MMHG | BODY MASS INDEX: 22.22 KG/M2 | HEART RATE: 87 BPM | RESPIRATION RATE: 18 BRPM | SYSTOLIC BLOOD PRESSURE: 132 MMHG | OXYGEN SATURATION: 93 % | HEIGHT: 66 IN

## 2017-10-27 PROBLEM — A41.9 SEVERE SEPSIS: Status: RESOLVED | Noted: 2017-10-25 | Resolved: 2017-10-27

## 2017-10-27 PROBLEM — R65.20 SEVERE SEPSIS: Status: RESOLVED | Noted: 2017-10-25 | Resolved: 2017-10-27

## 2017-10-27 LAB
ANION GAP SERPL CALC-SCNC: 7 MMOL/L
BASOPHILS # BLD AUTO: 0.01 K/UL
BASOPHILS NFR BLD: 0.2 %
BUN SERPL-MCNC: 20 MG/DL
CALCIUM SERPL-MCNC: 9.5 MG/DL
CHLORIDE SERPL-SCNC: 102 MMOL/L
CO2 SERPL-SCNC: 34 MMOL/L
CREAT SERPL-MCNC: 0.7 MG/DL
DIFFERENTIAL METHOD: ABNORMAL
EOSINOPHIL # BLD AUTO: 0.1 K/UL
EOSINOPHIL NFR BLD: 0.8 %
ERYTHROCYTE [DISTWIDTH] IN BLOOD BY AUTOMATED COUNT: 13.7 %
EST. GFR  (AFRICAN AMERICAN): >60 ML/MIN/1.73 M^2
EST. GFR  (NON AFRICAN AMERICAN): >60 ML/MIN/1.73 M^2
GLUCOSE SERPL-MCNC: 97 MG/DL
HCT VFR BLD AUTO: 40.1 %
HGB BLD-MCNC: 13 G/DL
LYMPHOCYTES # BLD AUTO: 1 K/UL
LYMPHOCYTES NFR BLD: 15.1 %
MAGNESIUM SERPL-MCNC: 1.8 MG/DL
MCH RBC QN AUTO: 28.8 PG
MCHC RBC AUTO-ENTMCNC: 32.4 G/DL
MCV RBC AUTO: 89 FL
MONOCYTES # BLD AUTO: 0.4 K/UL
MONOCYTES NFR BLD: 6.1 %
NEUTROPHILS # BLD AUTO: 5 K/UL
NEUTROPHILS NFR BLD: 77.8 %
PHOSPHATE SERPL-MCNC: 3.2 MG/DL
PLATELET # BLD AUTO: 169 K/UL
PMV BLD AUTO: 10.8 FL
POTASSIUM SERPL-SCNC: 4.4 MMOL/L
RBC # BLD AUTO: 4.52 M/UL
SODIUM SERPL-SCNC: 143 MMOL/L
WBC # BLD AUTO: 6.41 K/UL

## 2017-10-27 PROCEDURE — S4991 NICOTINE PATCH NONLEGEND: HCPCS | Performed by: HOSPITALIST

## 2017-10-27 PROCEDURE — A4216 STERILE WATER/SALINE, 10 ML: HCPCS | Performed by: EMERGENCY MEDICINE

## 2017-10-27 PROCEDURE — 84100 ASSAY OF PHOSPHORUS: CPT

## 2017-10-27 PROCEDURE — 25000003 PHARM REV CODE 250: Performed by: EMERGENCY MEDICINE

## 2017-10-27 PROCEDURE — 25000003 PHARM REV CODE 250: Performed by: HOSPITALIST

## 2017-10-27 PROCEDURE — 85025 COMPLETE CBC W/AUTO DIFF WBC: CPT

## 2017-10-27 PROCEDURE — 80048 BASIC METABOLIC PNL TOTAL CA: CPT

## 2017-10-27 PROCEDURE — 63600175 PHARM REV CODE 636 W HCPCS: Performed by: EMERGENCY MEDICINE

## 2017-10-27 PROCEDURE — 36415 COLL VENOUS BLD VENIPUNCTURE: CPT

## 2017-10-27 PROCEDURE — 83735 ASSAY OF MAGNESIUM: CPT

## 2017-10-27 RX ORDER — AMOXICILLIN AND CLAVULANATE POTASSIUM 500; 125 MG/1; MG/1
1 TABLET, FILM COATED ORAL 2 TIMES DAILY
Qty: 14 TABLET | Refills: 0 | Status: SHIPPED | OUTPATIENT
Start: 2017-10-27 | End: 2017-11-03

## 2017-10-27 RX ORDER — L. ACIDOPHILUS/L.BULGARICUS 100MM CELL
1 GRANULES IN PACKET (EA) ORAL 3 TIMES DAILY
Status: DISCONTINUED | OUTPATIENT
Start: 2017-10-27 | End: 2017-10-27 | Stop reason: HOSPADM

## 2017-10-27 RX ORDER — AMOXICILLIN AND CLAVULANATE POTASSIUM 500; 125 MG/1; MG/1
1 TABLET, FILM COATED ORAL 2 TIMES DAILY
Status: DISCONTINUED | OUTPATIENT
Start: 2017-10-27 | End: 2017-10-27 | Stop reason: HOSPADM

## 2017-10-27 RX ORDER — L. ACIDOPHILUS/L.BULGARICUS 100MM CELL
1 GRANULES IN PACKET (EA) ORAL 3 TIMES DAILY
COMMUNITY
Start: 2017-10-27 | End: 2017-11-03

## 2017-10-27 RX ADMIN — NICOTINE 1 PATCH: 21 PATCH, EXTENDED RELEASE TRANSDERMAL at 09:10

## 2017-10-27 RX ADMIN — ESCITALOPRAM OXALATE 10 MG: 10 TABLET ORAL at 09:10

## 2017-10-27 RX ADMIN — LACTOBACILLUS ACIDOPHILUS / LACTOBACILLUS BULGARICUS 1 EACH: 100 MILLION CFU STRENGTH GRANULES at 01:10

## 2017-10-27 RX ADMIN — CALCIUM 500 MG: 500 TABLET ORAL at 09:10

## 2017-10-27 RX ADMIN — GABAPENTIN 300 MG: 300 CAPSULE ORAL at 09:10

## 2017-10-27 RX ADMIN — SODIUM CHLORIDE, PRESERVATIVE FREE 3 ML: 5 INJECTION INTRAVENOUS at 01:10

## 2017-10-27 RX ADMIN — ATORVASTATIN CALCIUM 10 MG: 10 TABLET, FILM COATED ORAL at 09:10

## 2017-10-27 RX ADMIN — ALPRAZOLAM 1 MG: 0.5 TABLET ORAL at 09:10

## 2017-10-27 RX ADMIN — PANTOPRAZOLE SODIUM 40 MG: 40 TABLET, DELAYED RELEASE ORAL at 09:10

## 2017-10-27 RX ADMIN — HYDROCODONE BITARTRATE AND ACETAMINOPHEN 1 TABLET: 10; 325 TABLET ORAL at 01:10

## 2017-10-27 RX ADMIN — MEMANTINE HYDROCHLORIDE 5 MG: 5 TABLET ORAL at 09:10

## 2017-10-27 RX ADMIN — ROPINIROLE HYDROCHLORIDE 0.25 MG: 0.25 TABLET, FILM COATED ORAL at 09:10

## 2017-10-27 RX ADMIN — AZITHROMYCIN MONOHYDRATE 500 MG: 500 INJECTION, POWDER, LYOPHILIZED, FOR SOLUTION INTRAVENOUS at 10:10

## 2017-10-27 RX ADMIN — CEFTRIAXONE 1 G: 1 INJECTION, SOLUTION INTRAVENOUS at 10:10

## 2017-10-27 RX ADMIN — CETIRIZINE HYDROCHLORIDE 10 MG: 10 TABLET, FILM COATED ORAL at 09:10

## 2017-10-27 RX ADMIN — SODIUM CHLORIDE, PRESERVATIVE FREE 3 ML: 5 INJECTION INTRAVENOUS at 06:10

## 2017-10-27 RX ADMIN — AMOXICILLIN AND CLAVULANATE POTASSIUM 500 MG: 500; 125 TABLET, FILM COATED ORAL at 01:10

## 2017-10-27 RX ADMIN — HYDROCODONE BITARTRATE AND ACETAMINOPHEN 1 TABLET: 10; 325 TABLET ORAL at 06:10

## 2017-10-27 RX ADMIN — HYDROCODONE BITARTRATE AND ACETAMINOPHEN 1 TABLET: 10; 325 TABLET ORAL at 10:10

## 2017-10-27 RX ADMIN — FLUTICASONE PROPIONATE 1 SPRAY: 50 SPRAY, METERED NASAL at 09:10

## 2017-10-27 NOTE — PLAN OF CARE
Problem: Patient Care Overview  Goal: Plan of Care Review  Outcome: Ongoing (interventions implemented as appropriate)  Patient A/Ox4, remains free from falls, is afebrile, and states pain is being well managed with ordered medicine.  Patient complains of abdominal cramping, states relief with eating and stopping IV antibiotics.  Patient ambulates with assistance to restroom, voids spontaneously and has regular BM's. Will continue to monitor patient for PO antibiotic tolerance and abdominal characteristics/pain.

## 2017-10-27 NOTE — NURSING
Patient discharged to home with  and sister. Patient IV removed. Given discharge instructions and paper Rx for antibiotic.  Patient and family stated understanding.  Patient in no pain, SOB or distress.  Transport request has been made for wheelchair to 's car.

## 2017-10-27 NOTE — PROGRESS NOTES
ARIANA spoke with  (Temitope) at [facility] to arrange patient's post discharge f/u appt.  Per Temitope, they will call the patient with the date and time of her appointment because they will have to make some schedule changes.  Information placed on patient's AVS and provided on Written Healthcare Insructions.  Grazyna Khan LMSW, HIRAM, [facility]  10/27/2017

## 2017-10-27 NOTE — PROGRESS NOTES
WRITTEN DISCHARGE INSTRUCTIONS:    Follow-up Information     [facility]. Call on 10/31/2017.    Why:  [facility] will call you with the date and time of your appointment.  Contact information:  [place] [address]  [phone]                   [facility]  Unless otherwise directed by your provider, please   contact [facility], our nurse care line   that is available for 24/7 assistance.      [phone] (toll-free)      Registered nurses in the [facility]   provide: appointment scheduling, clinical advisement, health education, and other advisory services.    Thank you for choosing [facility] for your care.  Within 48-72 hours after leaving the hospital you will receive a call from [facility] nurses following up to see how you are doing.  The team will ask you a few questions and the call will last approximately 20 minutes.    Please answer any calls you may receive from [facility].  We want to continue to support you as you manage your healthcare needs.  [facility] is happy to have the opportunity to serve you.    Sincerely      Grazyna-  Your [facility]  [phone]

## 2017-10-27 NOTE — PROGRESS NOTES
[id number] - [place] - [address] AT [facility] [address]  [address]  [place] [address]  Phone: [phone] Fax: [phone]    Rx for Augmentin faxed to above pharmacy.    Grazyna Khan, TANNA, RIGO-ARIANA, [facility]  10/27/2017

## 2017-10-27 NOTE — HOSPITAL COURSE
Pt admitted with CAP. No fevers and mental status back to baseline. Blood cultures negative x 24 hours and likely to dc in am.     Pt will dc home on Augmentin to complete 10 days course.

## 2017-10-27 NOTE — NURSING
Patient colostomy bag changed. Patient was given demonstration of bag removal, stoma hole placement, and bag attachment.  Patient stated understanding, understands he will be giving teach back demonstration during next change.  Patient states readiness.  Stoma cut-out template and extra bag are at pt bedside.

## 2017-10-27 NOTE — SUBJECTIVE & OBJECTIVE
Interval History: pt reports feeling great and eating well     Review of Systems   Constitutional: Positive for activity change, appetite change and fatigue.   HENT: Negative.    Eyes: Positive for visual disturbance.   Respiratory: Negative.    Cardiovascular: Negative.    Gastrointestinal: Positive for nausea and vomiting.   Endocrine: Negative.    Genitourinary: Negative.    Musculoskeletal: Positive for arthralgias, back pain and neck pain.   Skin: Negative.    Neurological: Positive for dizziness, speech difficulty, weakness and light-headedness.   Psychiatric/Behavioral: Positive for confusion.     Objective:     Vital Signs (Most Recent):  Temp: 98.5 °F (36.9 °C) (10/26/17 2316)  Pulse: 80 (10/26/17 2316)  Resp: 18 (10/26/17 2316)  BP: (!) 140/63 (10/26/17 2316)  SpO2: 95 % (10/26/17 2316) Vital Signs (24h Range):  Temp:  [97.6 °F (36.4 °C)-98.5 °F (36.9 °C)] 98.5 °F (36.9 °C)  Pulse:  [68-80] 80  Resp:  [16-18] 18  SpO2:  [95 %-97 %] 95 %  BP: (110-140)/(54-71) 140/63     Weight: 62.7 kg (138 lb 3.7 oz)  Body mass index is 22.31 kg/m².  No intake or output data in the 24 hours ending 10/27/17 0640   Physical Exam   Constitutional: She is oriented to person, place, and time. She appears well-developed and well-nourished. No distress.   HENT:   Head: Normocephalic and atraumatic.   Mouth/Throat: Oropharynx is clear and moist.   Eyes: Right eye exhibits no discharge. Left eye exhibits no discharge.   Missing left eye, right eye poor visual acuity   Neck: Normal range of motion. Neck supple. No JVD present.   Cardiovascular: Normal rate, regular rhythm and intact distal pulses.    Murmur heard.  Pulmonary/Chest: Effort normal. No respiratory distress. She has no wheezes.   Rhonchi mid-lower lobe on right    Abdominal: Soft. Bowel sounds are normal. She exhibits no distension. There is no tenderness.   Musculoskeletal: Normal range of motion. She exhibits deformity. She exhibits no edema.   Lymphadenopathy:      She has no cervical adenopathy.   Neurological: She is alert and oriented to person, place, and time.   Skin: Skin is warm and dry. Capillary refill takes less than 2 seconds. No erythema.   Psychiatric: She has a normal mood and affect. Her behavior is normal.       Significant Labs:   Blood Culture:   Recent Labs  Lab 10/25/17  1040 10/25/17  1057   LABBLOO No Growth to date  No Growth to date No Growth to date  No Growth to date     BMP:   Recent Labs  Lab 10/27/17  0427   GLU 97      K 4.4      CO2 34*   BUN 20   CREATININE 0.7   CALCIUM 9.5   MG 1.8     CBC:   Recent Labs  Lab 10/25/17  1002 10/26/17  0440 10/27/17  0427   WBC 5.76 8.51 6.41   HGB 13.8 12.3 13.0   HCT 41.1 37.3 40.1    148* 169     Urine Culture: No results for input(s): LABURIN in the last 48 hours.    Significant Imaging: I have reviewed all pertinent imaging results/findings within the past 24 hours.

## 2017-10-27 NOTE — ASSESSMENT & PLAN NOTE
Influenza swab?  Appears to be secondary to pneumonia  Difficult to decipher if neck pain is anything but old, chronic findings, if neuro status does not improve and fever persistent will consider LP  Await urine and blood culture  -negative   IV antibiotics

## 2017-10-27 NOTE — PROGRESS NOTES
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 It was noted by  that she could not hold herself up in bathroom last night and then found slumped in chair this morning. On admit she had temp 101.8F, , lactic acid 2.8 and confusion. She had one episode of emesis this morning. Patient denies fever, chill, cough, dysuria, diarrhea and abdominal pain. She does endorse neck pain 10/10 after fall but no evidence of fracture on c-spine CT. Head CT did not reveal acute intracranial process. CXR significant for probable RLL pneumonia. She was admitted with IV antibiotics and await cultures.     Hospital Course:  Pt admitted with CAP. No fevers and mental status back to baseline. Blood cultures negative x 24 hours and likely to dc in am.     Interval History: pt reports feeling great and eating well     Review of Systems   Constitutional: Positive for activity change, appetite change and fatigue.   HENT: Negative.    Eyes: Positive for visual disturbance.   Respiratory: Negative.    Cardiovascular: Negative.    Gastrointestinal: Positive for nausea and vomiting.   Endocrine: Negative.    Genitourinary: Negative.    Musculoskeletal: Positive for arthralgias, back pain and neck pain.   Skin: Negative.    Neurological: Positive for dizziness, speech difficulty, weakness and light-headedness.   Psychiatric/Behavioral: Positive for  confusion.     Objective:     Vital Signs (Most Recent):  Temp: 98.5 °F (36.9 °C) (10/26/17 2316)  Pulse: 80 (10/26/17 2316)  Resp: 18 (10/26/17 2316)  BP: (!) 140/63 (10/26/17 2316)  SpO2: 95 % (10/26/17 2316) Vital Signs (24h Range):  Temp:  [97.6 °F (36.4 °C)-98.5 °F (36.9 °C)] 98.5 °F (36.9 °C)  Pulse:  [68-80] 80  Resp:  [16-18] 18  SpO2:  [95 %-97 %] 95 %  BP: (110-140)/(54-71) 140/63     Weight: 62.7 kg (138 lb 3.7 oz)  Body mass index is 22.31 kg/m².  No intake or output data in the 24 hours ending 10/27/17 0640   Physical Exam   Constitutional: She is oriented to person, place, and time. She appears well-developed and well-nourished. No distress.   HENT:   Head: Normocephalic and atraumatic.   Mouth/Throat: Oropharynx is clear and moist.   Eyes: Right eye exhibits no discharge. Left eye exhibits no discharge.   Missing left eye, right eye poor visual acuity   Neck: Normal range of motion. Neck supple. No JVD present.   Cardiovascular: Normal rate, regular rhythm and intact distal pulses.    Murmur heard.  Pulmonary/Chest: Effort normal. No respiratory distress. She has no wheezes.   Rhonchi mid-lower lobe on right    Abdominal: Soft. Bowel sounds are normal. She exhibits no distension. There is no tenderness.   Musculoskeletal: Normal range of motion. She exhibits deformity. She exhibits no edema.   Lymphadenopathy:     She has no cervical adenopathy.   Neurological: She is alert and oriented to person, place, and time.   Skin: Skin is warm and dry. Capillary refill takes less than 2 seconds. No erythema.   Psychiatric: She has a normal mood and affect. Her behavior is normal.       Significant Labs:   Blood Culture:   Recent Labs  Lab 10/25/17  1040 10/25/17  1057   LABBLOO No Growth to date  No Growth to date No Growth to date  No Growth to date     BMP:   Recent Labs  Lab 10/27/17  0427   GLU 97      K 4.4      CO2 34*   BUN 20   CREATININE 0.7   CALCIUM 9.5   MG 1.8     CBC:   Recent  Labs  Lab 10/25/17  1002 10/26/17  0440 10/27/17  0427   WBC 5.76 8.51 6.41   HGB 13.8 12.3 13.0   HCT 41.1 37.3 40.1    148* 169     Urine Culture: No results for input(s): LABURIN in the last 48 hours.    Significant Imaging: I have reviewed all pertinent imaging results/findings within the past 24 hours.    Assessment/Plan:      * Pneumonia of right lower lobe due to infectious organism    Rocephin and azithromycin   Supportive care            Severe sepsis    Influenza swab?  Appears to be secondary to pneumonia  Difficult to decipher if neck pain is anything but old, chronic findings, if neuro status does not improve and fever persistent will consider LP  Await urine and blood culture  -negative   IV antibiotics           GERD without esophagitis    PPI daily         Restless leg syndrome, controlled    Resume requip          Other chronic pain    Pt family reports chronic pain in back, neck and shoulders  -on lortab, zanafelx and neurontin   PT/OT for dc planning             Dementia without behavioral disturbance    Resume aricept and nemenda   no acute issues           Tobacco abuse    Smoking cessation d/w patient  Nicotine patch offered           Essential hypertension    Bp in normal range   Hold norvasc and resume metoprolol with holding parameters              VTE Risk Mitigation         Ordered     enoxaparin injection 40 mg  Daily     Route:  Subcutaneous        10/25/17 1332     Medium Risk of VTE  Once      10/25/17 1332              Kassy Downs MD  Department of Hospital Medicine   [facility]

## 2017-10-27 NOTE — PROGRESS NOTES
Rx is $3.30 per Robina @ [facility].  Grazyna Khan LMSW, ACPRIYA-ARIANA, [facility]  10/27/2017

## 2017-10-28 ENCOUNTER — PATIENT OUTREACH (OUTPATIENT)
Dept: ADMINISTRATIVE | Facility: CLINIC | Age: 77
End: 2017-10-28

## 2017-10-29 NOTE — PATIENT INSTRUCTIONS
Discharge Instructions for Pneumonia  You have been diagnosed with pneumonia, a serious lung infection. Most cases of pneumonia are caused by bacteria. Pneumonia most often occurs in older adults, young children, and people with chronic health problems.  Home Care  Take your medication exactly as directed. Dont skip doses. Continue taking your antibiotics as directed until they are all gone--even if you start to feel better. This will prevent the pneumonia from coming back.  Drink at least 8 glasses of water daily, unless directed otherwise. This helps to loosen and thin secretions so that you can cough them up.  Use a cool-mist humidifier in your bedroom. Be sure to clean the humidifier daily.  Coughing up mucus is normal. Dont use medications to suppress your cough unless your cough is dry, painful, or interferes with your sleep. You may use an expectorant if ordered by your doctor.  Warm compresses or a moist heating pad on the lowest setting can be used to relieve chest discomfort. Use several times a day for 15-20 minutes at a time. (To prevent injuring your skin, be sure the temperature of the compress or heating pad is warm, not hot.)  Get plenty of rest until your fever, shortness of breath, and chest pain go away.  Plan to get a flu shot every year.  Ask your doctor about a pneumonia vaccination.  Follow-Up  Make a follow-up appointment as directed by our staff.    When to Seek Medical Attention  Call 911 right away if you have any of the following:  Chest pain  Trouble breathing  Blue lips or fingernails  Otherwise, call your doctor if you have any of the following:  Fever above 100.4°F  Yellow, green, bloody, or smelly sputum  More than normal mucus production  Vomiting   © [phone] Jasiel Spangler, [address] [place] [address], [place] [address].

## 2017-10-30 LAB
BACTERIA BLD CULT: NORMAL
BACTERIA BLD CULT: NORMAL

## 2017-10-30 NOTE — PHYSICIAN QUERY
"PT Name: Annette Fallon  MR #: [id number]    Physician Query Form - Pneumonia Clarification     CDS/: Toni Dias               Contact information:  This form is a permanent document in the medical record.    Query Date:  October 30, 2017    By submitting this query, we are merely seeking further clarification of documentation. Please utilize your independent clinical judgment when addressing the question(s) below.    The Medical record contains the following:   Indicators   Supporting Clinical Findings Location in Medical Record   x "Pneumonia" documented Pneumonia of right lower lobe due to infectious organism Rocephin and azithromycin    Progress 10/26   x Chest X-Ray: IMPRESSION:     Patchy consolidation within the right lung base which may be related to right basilar pneumonia or aspiration. Continued followup is recommended to ensure clearance.    XR chest 10/25   x PaO2    PaCO2     O2 sat O2 sats 93-97% RA- 1L Flow sheet 10/25-27    Cultures      x Treatment  Rocephin and azithromycin    Amoxicillin- clavulanate, 500/125mg, 500mg tablet, oral, 2 times daily     Progress 10/26; MAR 10/26-27    MAR 10/27    Supplemental O2     x Other Severe sepsis Influenza swab?   Appears to be secondary to pneumonia    Progress 10/26       Provider, please specify type of pneumonia.    [  ] Bacterial Pneumonia (Specify organism): ______________________  [x  ] Bacterial, Gram Negative organism Pneumonia  [  ] Viral Pneumonia (Specify virus): _______________________  [  ] Fungal Pneumonia (Specify organism): _______________________  [  ] Aspiration Pneumonia  [  ] Other type of pneumonia (please specify): ______________________________________  [  ] Clinically undetermined    Please document in your progress notes daily for the duration of treatment, until resolved, and include in your discharge summary.    .                                                                                    "

## 2017-11-07 NOTE — DISCHARGE SUMMARY
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 It was noted by  that she could not hold herself up in bathroom last night and then found slumped in chair this morning. On admit she had temp 101.8F, , lactic acid 2.8 and confusion. She had one episode of emesis this morning. Patient denies fever, chill, cough, dysuria, diarrhea and abdominal pain. She does endorse neck pain 10/10 after fall but no evidence of fracture on c-spine CT. Head CT did not reveal acute intracranial process. CXR significant for probable RLL pneumonia. She was admitted with IV antibiotics and await cultures.     * No surgery found *      Hospital Course:   Pt admitted with CAP. No fevers and mental status back to baseline. Blood cultures negative x 24 hours and likely to dc in am.     Pt will dc home on Augmentin to complete 10 days course.      Consults:     No new Assessment & Plan notes have been filed under this hospital service since the last note was generated.  Service: Hospital Medicine    Final Active Diagnoses:    Diagnosis Date Noted POA    PRINCIPAL PROBLEM:  Pneumonia of right lower lobe due to infectious organism [J18.1] 10/25/2017 Yes    Essential hypertension [I10] 10/25/2017 Yes    Tobacco abuse [Z72.0] 10/25/2017 Yes    Dementia without behavioral disturbance [F03.90] 10/25/2017 Yes    Other chronic pain [G89.29] 10/25/2017 Yes    Restless leg syndrome, controlled  [G25.81] 10/25/2017 Yes    GERD without esophagitis [K21.9] 10/25/2017 Yes      Problems Resolved During this Admission:    Diagnosis Date Noted Date Resolved POA    Severe sepsis [A41.9, R65.20] 10/25/2017 10/27/2017 Yes       Discharged Condition: good    Disposition: Home or Self Care    Follow Up:  Follow-up Information     [facility] Timi Friedman. Call on 10/31/2017.    Why:  [facility] will call you with the date and time of your appointment.  Contact information:  [place] [address]  [phone]                 Patient Instructions:     Diet general     Activity as tolerated     Call MD for:  temperature >100.4     Call MD for:  persistent nausea and vomiting or diarrhea     Call MD for:  increased confusion or weakness         Pending Diagnostic Studies:     None         Medications:  Reconciled Home Medications:   Discharge Medication List as of 10/27/2017  4:07 PM      START taking these medications    Details   amoxicillin-clavulanate 500-125mg (AUGMENTIN) 500-125 mg Tab Take 1 tablet (500 mg total) by mouth 2 (two) times daily., Starting Fri 10/27/2017, Until Fri 11/3/2017, Print      lactobacillus acidophilus & bulgar (LACTINEX) 100 million cell packet Take 1 packet (1 each total) by mouth 3 (three) times daily., Starting Fri 10/27/2017, Until Fri 11/3/2017, OTC         CONTINUE these medications which have NOT CHANGED    Details   alendronate (FOSAMAX) 70 MG tablet Take 70 mg by mouth every 7 days., Historical Med      amLODIPine (NORVASC) 5 MG tablet Take 5 mg by mouth once daily., Historical Med      atorvastatin (LIPITOR) 10 MG tablet Take 10 mg by mouth once daily., Historical Med      calcium carbonate (OS-JOSÉ ANTONIO) 500 mg calcium (1,250 mg) tablet Take 1 tablet by mouth once daily., Historical Med      donepezil (ARICEPT) 10 MG tablet Take 10 mg by mouth every evening., Historical Med      fluticasone (FLONASE) 50 mcg/actuation nasal spray 1 spray by Each Nare route once daily., Historical  Med      gabapentin (NEURONTIN) 100 MG capsule Take 300 mg by mouth once daily. , Historical Med      hydrocodone-acetaminophen 10-325mg (NORCO)  mg Tab Take by mouth., Historical Med      loratadine (CLARITIN) 10 mg tablet Take 10 mg by mouth once daily., Historical Med      omeprazole (PRILOSEC) 20 MG capsule Take 20 mg by mouth once daily., Historical Med      ropinirole (REQUIP) 0.25 MG tablet Take 0.25 mg by mouth 2 (two) times daily., Historical Med      tizanidine (ZANAFLEX) 4 MG tablet Take 4 mg by mouth every 6 (six) hours as needed., Historical Med      traZODone (DESYREL) 50 MG tablet Take 50 mg by mouth every evening., Historical Med      alprazolam (XANAX) 1 MG tablet Take 1 mg by mouth 2 (two) times daily., Historical Med      escitalopram oxalate (LEXAPRO) 10 MG tablet Take 10 mg by mouth once daily., Historical Med      memantine (NAMENDA) 5 MG Tab Take 5 mg by mouth once daily., Historical Med      metoprolol succinate (TOPROL-XL) 25 MG 24 hr tablet Take 25 mg by mouth once daily., Historical Med      oxybutynin (DITROPAN) 5 MG Tab Take 5 mg by mouth 2 (two) times daily., Historical Med         STOP taking these medications       glycopyrrolate (ROBINUL) 1 mg Tab Comments:   Reason for Stopping:         ergocalciferol (ERGOCALCIFEROL) 50,000 unit Cap Comments:   Reason for Stopping:         ibuprofen (ADVIL,MOTRIN) 800 MG tablet Comments:   Reason for Stopping:               Indwelling Lines/Drains at time of discharge:   Lines/Drains/Airways          No matching active lines, drains, or airways          Time spent on the discharge of patient: 35 minutes  Patient was seen and examined on the date of discharge and determined to be suitable for discharge.         Kassy Downs MD  Department of Hospital Medicine  [facility]

## 2019-01-13 ENCOUNTER — HOSPITAL ENCOUNTER (INPATIENT)
Facility: HOSPITAL | Age: 79
LOS: 2 days | Discharge: HOME | DRG: 871 | End: 2019-01-15
Attending: EMERGENCY MEDICINE | Admitting: HOSPITALIST
Payer: MEDICARE

## 2019-01-13 DIAGNOSIS — J18.9 PNEUMONIA OF RIGHT LOWER LOBE DUE TO INFECTIOUS ORGANISM: Primary | ICD-10-CM

## 2019-01-13 DIAGNOSIS — R41.82 ALTERED MENTAL STATUS: ICD-10-CM

## 2019-01-13 DIAGNOSIS — G30.0 EARLY ONSET ALZHEIMER'S DEMENTIA WITHOUT BEHAVIORAL DISTURBANCE: ICD-10-CM

## 2019-01-13 DIAGNOSIS — F02.80 EARLY ONSET ALZHEIMER'S DEMENTIA WITHOUT BEHAVIORAL DISTURBANCE: ICD-10-CM

## 2019-01-13 DIAGNOSIS — I10 ESSENTIAL HYPERTENSION: ICD-10-CM

## 2019-01-13 DIAGNOSIS — R50.9 ACUTE FEBRILE ILLNESS: ICD-10-CM

## 2019-01-13 PROBLEM — E78.5 HYPERLIPIDEMIA: Status: ACTIVE | Noted: 2019-01-13

## 2019-01-13 PROBLEM — F32.A DEPRESSION: Status: ACTIVE | Noted: 2019-01-13

## 2019-01-13 LAB
ALBUMIN SERPL BCP-MCNC: 3.3 G/DL
ALP SERPL-CCNC: 111 U/L
ALT SERPL W/O P-5'-P-CCNC: 11 U/L
AMORPH CRY URNS QL MICRO: NORMAL
ANION GAP SERPL CALC-SCNC: 10 MMOL/L
AST SERPL-CCNC: 19 U/L
BACTERIA #/AREA URNS HPF: NORMAL /HPF
BASOPHILS # BLD AUTO: 0.01 K/UL
BASOPHILS NFR BLD: 0.2 %
BILIRUB SERPL-MCNC: 0.7 MG/DL
BILIRUB UR QL STRIP: NEGATIVE
BNP SERPL-MCNC: 54 PG/ML
BUN SERPL-MCNC: 18 MG/DL
CALCIUM SERPL-MCNC: 9.6 MG/DL
CHLORIDE SERPL-SCNC: 98 MMOL/L
CLARITY UR: CLEAR
CO2 SERPL-SCNC: 33 MMOL/L
COLOR UR: ABNORMAL
CREAT SERPL-MCNC: 0.7 MG/DL
CTP QC/QA: YES
DIFFERENTIAL METHOD: ABNORMAL
EOSINOPHIL # BLD AUTO: 0 K/UL
EOSINOPHIL NFR BLD: 0.5 %
ERYTHROCYTE [DISTWIDTH] IN BLOOD BY AUTOMATED COUNT: 13 %
EST. GFR  (AFRICAN AMERICAN): >60 ML/MIN/1.73 M^2
EST. GFR  (NON AFRICAN AMERICAN): >60 ML/MIN/1.73 M^2
FLUAV AG NPH QL: NEGATIVE
FLUBV AG NPH QL: NEGATIVE
GLUCOSE SERPL-MCNC: 129 MG/DL
GLUCOSE UR QL STRIP: NEGATIVE
HCT VFR BLD AUTO: 35.6 %
HGB BLD-MCNC: 11.6 G/DL
HGB UR QL STRIP: ABNORMAL
HYALINE CASTS #/AREA URNS LPF: 0 /LPF
INR PPP: 1
KETONES UR QL STRIP: NEGATIVE
LACTATE SERPL-SCNC: 2.1 MMOL/L
LEUKOCYTE ESTERASE UR QL STRIP: NEGATIVE
LYMPHOCYTES # BLD AUTO: 0.5 K/UL
LYMPHOCYTES NFR BLD: 8.3 %
MCH RBC QN AUTO: 28.8 PG
MCHC RBC AUTO-ENTMCNC: 32.6 G/DL
MCV RBC AUTO: 88 FL
MICROSCOPIC COMMENT: NORMAL
MONOCYTES # BLD AUTO: 0.8 K/UL
MONOCYTES NFR BLD: 11.9 %
NEUTROPHILS # BLD AUTO: 5 K/UL
NEUTROPHILS NFR BLD: 79.1 %
NITRITE UR QL STRIP: NEGATIVE
PH UR STRIP: 5 [PH] (ref 5–8)
PLATELET # BLD AUTO: 193 K/UL
PMV BLD AUTO: 10.2 FL
POCT GLUCOSE: 133 MG/DL (ref 70–110)
POTASSIUM SERPL-SCNC: 3 MMOL/L
PROT SERPL-MCNC: 7.1 G/DL
PROT UR QL STRIP: ABNORMAL
PROTHROMBIN TIME: 10.7 SEC
RBC # BLD AUTO: 4.03 M/UL
RBC #/AREA URNS HPF: 4 /HPF (ref 0–4)
SODIUM SERPL-SCNC: 141 MMOL/L
SP GR UR STRIP: 1.02 (ref 1–1.03)
SQUAMOUS #/AREA URNS HPF: 5 /HPF
TROPONIN I SERPL DL<=0.01 NG/ML-MCNC: 0.01 NG/ML
URN SPEC COLLECT METH UR: ABNORMAL
UROBILINOGEN UR STRIP-ACNC: ABNORMAL EU/DL
WBC # BLD AUTO: 6.36 K/UL
WBC #/AREA URNS HPF: 2 /HPF (ref 0–5)
WBC CLUMPS URNS QL MICRO: NORMAL

## 2019-01-13 PROCEDURE — 99285 EMERGENCY DEPT VISIT HI MDM: CPT | Mod: 25

## 2019-01-13 PROCEDURE — 96374 THER/PROPH/DIAG INJ IV PUSH: CPT

## 2019-01-13 PROCEDURE — 93010 EKG 12-LEAD: ICD-10-PCS | Mod: ,,, | Performed by: INTERNAL MEDICINE

## 2019-01-13 PROCEDURE — 85610 PROTHROMBIN TIME: CPT

## 2019-01-13 PROCEDURE — 80053 COMPREHEN METABOLIC PANEL: CPT

## 2019-01-13 PROCEDURE — 63600175 PHARM REV CODE 636 W HCPCS: Performed by: PHYSICIAN ASSISTANT

## 2019-01-13 PROCEDURE — 63600175 PHARM REV CODE 636 W HCPCS: Performed by: EMERGENCY MEDICINE

## 2019-01-13 PROCEDURE — 25000003 PHARM REV CODE 250: Performed by: EMERGENCY MEDICINE

## 2019-01-13 PROCEDURE — 84484 ASSAY OF TROPONIN QUANT: CPT

## 2019-01-13 PROCEDURE — 11000001 HC ACUTE MED/SURG PRIVATE ROOM

## 2019-01-13 PROCEDURE — 82962 GLUCOSE BLOOD TEST: CPT

## 2019-01-13 PROCEDURE — 25000003 PHARM REV CODE 250: Performed by: PHYSICIAN ASSISTANT

## 2019-01-13 PROCEDURE — 96361 HYDRATE IV INFUSION ADD-ON: CPT

## 2019-01-13 PROCEDURE — 87040 BLOOD CULTURE FOR BACTERIA: CPT | Mod: 59

## 2019-01-13 PROCEDURE — G0378 HOSPITAL OBSERVATION PER HR: HCPCS

## 2019-01-13 PROCEDURE — 85025 COMPLETE CBC W/AUTO DIFF WBC: CPT

## 2019-01-13 PROCEDURE — 83880 ASSAY OF NATRIURETIC PEPTIDE: CPT

## 2019-01-13 PROCEDURE — 96375 TX/PRO/DX INJ NEW DRUG ADDON: CPT

## 2019-01-13 PROCEDURE — 83605 ASSAY OF LACTIC ACID: CPT

## 2019-01-13 PROCEDURE — 81000 URINALYSIS NONAUTO W/SCOPE: CPT

## 2019-01-13 PROCEDURE — 93010 ELECTROCARDIOGRAM REPORT: CPT | Mod: ,,, | Performed by: INTERNAL MEDICINE

## 2019-01-13 PROCEDURE — 93005 ELECTROCARDIOGRAM TRACING: CPT

## 2019-01-13 RX ORDER — HYDROCODONE BITARTRATE AND ACETAMINOPHEN 5; 325 MG/1; MG/1
1 TABLET ORAL EVERY 4 HOURS PRN
Status: DISCONTINUED | OUTPATIENT
Start: 2019-01-13 | End: 2019-01-15 | Stop reason: HOSPADM

## 2019-01-13 RX ORDER — ATORVASTATIN CALCIUM 10 MG/1
20 TABLET, FILM COATED ORAL DAILY
Status: DISCONTINUED | OUTPATIENT
Start: 2019-01-14 | End: 2019-01-15 | Stop reason: HOSPADM

## 2019-01-13 RX ORDER — LISINOPRIL 20 MG/1
40 TABLET ORAL DAILY
Status: DISCONTINUED | OUTPATIENT
Start: 2019-01-14 | End: 2019-01-15 | Stop reason: HOSPADM

## 2019-01-13 RX ORDER — GABAPENTIN 300 MG/1
300 CAPSULE ORAL DAILY
Status: DISCONTINUED | OUTPATIENT
Start: 2019-01-14 | End: 2019-01-15 | Stop reason: HOSPADM

## 2019-01-13 RX ORDER — AZITHROMYCIN 250 MG/1
250 TABLET, FILM COATED ORAL DAILY
Status: DISCONTINUED | OUTPATIENT
Start: 2019-01-14 | End: 2019-01-15 | Stop reason: HOSPADM

## 2019-01-13 RX ORDER — SODIUM CHLORIDE 0.9 % (FLUSH) 0.9 %
5 SYRINGE (ML) INJECTION
Status: DISCONTINUED | OUTPATIENT
Start: 2019-01-13 | End: 2019-01-15 | Stop reason: HOSPADM

## 2019-01-13 RX ORDER — AMLODIPINE BESYLATE 5 MG/1
5 TABLET ORAL DAILY
Status: DISCONTINUED | OUTPATIENT
Start: 2019-01-14 | End: 2019-01-15 | Stop reason: HOSPADM

## 2019-01-13 RX ORDER — FLUOXETINE HYDROCHLORIDE 20 MG/1
20 CAPSULE ORAL DAILY
Status: DISCONTINUED | OUTPATIENT
Start: 2019-01-14 | End: 2019-01-15 | Stop reason: HOSPADM

## 2019-01-13 RX ORDER — AZITHROMYCIN 250 MG/1
500 TABLET, FILM COATED ORAL DAILY
Status: DISCONTINUED | OUTPATIENT
Start: 2019-01-13 | End: 2019-01-13

## 2019-01-13 RX ORDER — POLYETHYLENE GLYCOL 3350 17 G/17G
17 POWDER, FOR SOLUTION ORAL DAILY
Status: DISCONTINUED | OUTPATIENT
Start: 2019-01-14 | End: 2019-01-15 | Stop reason: HOSPADM

## 2019-01-13 RX ORDER — PANTOPRAZOLE SODIUM 40 MG/1
40 TABLET, DELAYED RELEASE ORAL DAILY
Status: DISCONTINUED | OUTPATIENT
Start: 2019-01-14 | End: 2019-01-15 | Stop reason: HOSPADM

## 2019-01-13 RX ORDER — IBUPROFEN 200 MG
1 TABLET ORAL DAILY PRN
Status: DISCONTINUED | OUTPATIENT
Start: 2019-01-13 | End: 2019-01-14

## 2019-01-13 RX ORDER — TRAZODONE HYDROCHLORIDE 50 MG/1
50 TABLET ORAL NIGHTLY
Status: DISCONTINUED | OUTPATIENT
Start: 2019-01-13 | End: 2019-01-13

## 2019-01-13 RX ORDER — POTASSIUM CHLORIDE 20 MEQ/1
40 TABLET, EXTENDED RELEASE ORAL
Status: COMPLETED | OUTPATIENT
Start: 2019-01-13 | End: 2019-01-13

## 2019-01-13 RX ORDER — ACETAMINOPHEN 500 MG
1000 TABLET ORAL
Status: COMPLETED | OUTPATIENT
Start: 2019-01-13 | End: 2019-01-13

## 2019-01-13 RX ORDER — LEVOFLOXACIN 5 MG/ML
750 INJECTION, SOLUTION INTRAVENOUS
Status: DISCONTINUED | OUTPATIENT
Start: 2019-01-14 | End: 2019-01-13

## 2019-01-13 RX ORDER — LEVOFLOXACIN 5 MG/ML
750 INJECTION, SOLUTION INTRAVENOUS
Status: COMPLETED | OUTPATIENT
Start: 2019-01-13 | End: 2019-01-13

## 2019-01-13 RX ORDER — LEVOFLOXACIN 5 MG/ML
750 INJECTION, SOLUTION INTRAVENOUS
Status: DISCONTINUED | OUTPATIENT
Start: 2019-01-13 | End: 2019-01-13

## 2019-01-13 RX ORDER — CETIRIZINE HYDROCHLORIDE 5 MG/1
5 TABLET ORAL DAILY
Status: DISCONTINUED | OUTPATIENT
Start: 2019-01-14 | End: 2019-01-15 | Stop reason: HOSPADM

## 2019-01-13 RX ORDER — ERGOCALCIFEROL 1.25 MG/1
50000 CAPSULE ORAL
COMMUNITY

## 2019-01-13 RX ORDER — OXYBUTYNIN CHLORIDE 5 MG/1
5 TABLET ORAL 2 TIMES DAILY
Status: DISCONTINUED | OUTPATIENT
Start: 2019-01-13 | End: 2019-01-15 | Stop reason: HOSPADM

## 2019-01-13 RX ORDER — ROPINIROLE 0.25 MG/1
0.25 TABLET, FILM COATED ORAL 2 TIMES DAILY
Status: DISCONTINUED | OUTPATIENT
Start: 2019-01-13 | End: 2019-01-15 | Stop reason: HOSPADM

## 2019-01-13 RX ORDER — ACETAMINOPHEN 325 MG/1
650 TABLET ORAL EVERY 6 HOURS PRN
Status: DISCONTINUED | OUTPATIENT
Start: 2019-01-13 | End: 2019-01-15 | Stop reason: HOSPADM

## 2019-01-13 RX ORDER — FLUOXETINE HYDROCHLORIDE 20 MG/1
20 CAPSULE ORAL DAILY
COMMUNITY
End: 2020-12-16 | Stop reason: CLARIF

## 2019-01-13 RX ORDER — TIZANIDINE 4 MG/1
4 TABLET ORAL EVERY 6 HOURS PRN
Status: DISCONTINUED | OUTPATIENT
Start: 2019-01-13 | End: 2019-01-14

## 2019-01-13 RX ORDER — ENOXAPARIN SODIUM 100 MG/ML
40 INJECTION SUBCUTANEOUS EVERY 24 HOURS
Status: DISCONTINUED | OUTPATIENT
Start: 2019-01-13 | End: 2019-01-15 | Stop reason: HOSPADM

## 2019-01-13 RX ORDER — LISINOPRIL 40 MG/1
40 TABLET ORAL DAILY
COMMUNITY

## 2019-01-13 RX ORDER — CALCIUM CARBONATE 500(1250)
500 TABLET ORAL DAILY
Status: DISCONTINUED | OUTPATIENT
Start: 2019-01-14 | End: 2019-01-15 | Stop reason: HOSPADM

## 2019-01-13 RX ADMIN — CEFTRIAXONE 1 G: 1 INJECTION, SOLUTION INTRAVENOUS at 09:01

## 2019-01-13 RX ADMIN — ACETAMINOPHEN 1000 MG: 500 TABLET, FILM COATED ORAL at 06:01

## 2019-01-13 RX ADMIN — OXYBUTYNIN CHLORIDE 5 MG: 5 TABLET ORAL at 10:01

## 2019-01-13 RX ADMIN — ROPINIROLE HYDROCHLORIDE 0.25 MG: 0.25 TABLET, FILM COATED ORAL at 10:01

## 2019-01-13 RX ADMIN — ENOXAPARIN SODIUM 40 MG: 100 INJECTION SUBCUTANEOUS at 10:01

## 2019-01-13 RX ADMIN — SODIUM CHLORIDE 1932 ML: 0.9 INJECTION, SOLUTION INTRAVENOUS at 05:01

## 2019-01-13 RX ADMIN — LEVOFLOXACIN 750 MG: 750 INJECTION, SOLUTION INTRAVENOUS at 07:01

## 2019-01-13 RX ADMIN — HYDROCODONE BITARTRATE AND ACETAMINOPHEN 1 TABLET: 5; 325 TABLET ORAL at 10:01

## 2019-01-13 RX ADMIN — AZITHROMYCIN 500 MG: 250 TABLET, FILM COATED ORAL at 09:01

## 2019-01-13 RX ADMIN — POTASSIUM CHLORIDE 40 MEQ: 1500 TABLET, EXTENDED RELEASE ORAL at 07:01

## 2019-01-13 RX ADMIN — HYDROCODONE BITARTRATE AND ACETAMINOPHEN 1 TABLET: 5; 325 TABLET ORAL at 09:01

## 2019-01-13 NOTE — ED PROVIDER NOTES
Encounter Date: 1/13/2019  Sort:  78-year-old female history of hypertension, memory loss, depression brought by family for evaluation of 3-4 day history of confusion, memory lapse that worsened today. Pt has history of fracture and bulging disc. Pt c/o back pain and left leg pain. Pt did have fall 2 days ago during which her legs gave out. Denies head injury, fever, urinary symptoms, NVD. PERLA Parnell PA-C     SCRIBE #1 NOTE: I, Hugo Benson, am scribing for, and in the presence of,  Danny Marshall MD. I have scribed the following portions of the note - Other sections scribed: HPI, ROS.       History     Chief Complaint   Patient presents with    Altered Mental Status     pt arrives with family who states pt has been confused for about 3 or 4 days and got worse today; denies n/v/d at this time; denies painful urination or any urimary problems; pt legally blind in both eyes     CC: Altered Mental Status    HPI  The patient is a 77 y/o female smoker with pmhx of blindness, depression, GERD, HTN, dementia, palpitations, and osteopenia that presents for emergent evaluation of intermittent confusion, bilateral hand numbness, and shaking that began x4 days ago. Although temporary, the family reports the episodes are worsening. Pt did not have an episode yesterday. Her current episode began around 1500 today PTA. Family is also c/o a moderate cough and subjective fever. The pt is c/o a sore throat. The family is unsure if the pt took her medication this morning, noting that she handles all of her own medication. Family also reports that the pt fell x2 days ago. They otherwise deny head trauma, LOC, SOB, chest pain, NVD, abdominal pain, or dysuria.       The history is provided by a relative and the patient. No  was used.     Review of patient's allergies indicates:  No Known Allergies  Past Medical History:   Diagnosis Date    Depression     GERD (gastroesophageal reflux disease)     History of  radiofrequency ablation procedure for cardiac arrhythmia     Hypertension     Memory loss     Osteoporosis     Palpitations      Past Surgical History:   Procedure Laterality Date    ANKLE SURGERY Left     APPENDECTOMY       SECTION      EYE SURGERY      HIP SURGERY      HIP SURGERY Left     HYSTERECTOMY      ROTATOR CUFF REPAIR      L arm    TONSILLECTOMY       History reviewed. No pertinent family history.  Social History     Tobacco Use    Smoking status: Current Every Day Smoker     Packs/day: 0.50     Types: Cigarettes    Smokeless tobacco: Never Used   Substance Use Topics    Alcohol use: No    Drug use: No     Review of Systems   Constitutional: Positive for chills and fever.   HENT: Positive for sore throat. Negative for congestion and rhinorrhea.    Respiratory: Positive for cough.    Gastrointestinal: Negative for abdominal pain, diarrhea, nausea and vomiting.   Genitourinary: Negative for dysuria.   Musculoskeletal: Negative for back pain and neck pain.   Skin: Negative for rash.   Neurological: Positive for numbness (bilateral hands). Negative for headaches.   All other systems reviewed and are negative.      Physical Exam     Initial Vitals [19 1718]   BP Pulse Resp Temp SpO2   (!) 147/63 93 18 (!) 102.4 °F (39.1 °C) (!) 91 %      MAP       --         Physical Exam  The patient was examined specifically for the following:   General:No significant distress, Good color, Warm and dry. Head and neck:Scalp atraumatic, Neck supple. Neurological:Appropriate conversation, Gross motor deficits. Eyes:Conjugate gaze, Clear corneas. ENT: No epistaxis. Cardiac: Regular rate and rhythm, Grossly normal heart tones. Pulmonary: Wheezing, Rales. Gastrointestinal: Abdominal tenderness, Abdominal distention. Musculoskeletal: Extremity deformity, Apparent pain with range of motion of the joints. Skin: Rash.   The findings on examination were normal except for the following:  The patient has  mild scattered wheezing bilaterally.  Heart tones are normal.  The patient has regular rate and rhythm.  The respiratory rate is 18.  The abdomen is nontender. The neck is supple.  The patient is blind.  There is no significant extremity tenderness or pain with range of motion of any joints.  ED Course   Procedures  Labs Reviewed   CBC W/ AUTO DIFFERENTIAL - Abnormal; Notable for the following components:       Result Value    Hemoglobin 11.6 (*)     Hematocrit 35.6 (*)     Lymph # 0.5 (*)     Gran% 79.1 (*)     Lymph% 8.3 (*)     All other components within normal limits   COMPREHENSIVE METABOLIC PANEL - Abnormal; Notable for the following components:    Potassium 3.0 (*)     CO2 33 (*)     Glucose 129 (*)     Albumin 3.3 (*)     All other components within normal limits   URINALYSIS, REFLEX TO URINE CULTURE - Abnormal; Notable for the following components:    Protein, UA 1+ (*)     Occult Blood UA 1+ (*)     Urobilinogen, UA 4.0-6.0 (*)     All other components within normal limits    Narrative:     Preferred Collection Type->Urine, Clean Catch   POCT GLUCOSE - Abnormal; Notable for the following components:    POCT Glucose 133 (*)     All other components within normal limits   CULTURE, BLOOD   CULTURE, BLOOD   CULTURE, RESPIRATORY   LACTIC ACID, PLASMA   TROPONIN I   B-TYPE NATRIURETIC PEPTIDE   PROTIME-INR   URINALYSIS MICROSCOPIC    Narrative:     Preferred Collection Type->Urine, Clean Catch   INFLUENZA A AND B ANTIGEN   POCT INFLUENZA A/B     EKG Readings: (Independently Interpreted)   This patient is in a normal sinus rhythm with a heart rate 87.  The patient has a right bundle branch block.  There is poor R-wave progression across the precordium.  The patient has left axis deviation.  There are nonspecific ST segment and T-wave changes.  There is no evidence of acute myocardial infarction or malignant arrhythmia.       Imaging Results          CT Chest Without Contrast (In process)                X-Ray  Chest AP Portable (Final result)     Abnormal  Result time 01/13/19 18:41:50    Final result by Leann Jung MD (01/13/19 18:41:50)                 Impression:      Right lower lobe patchy density.  Findings may represent persistent aspiration, pneumonic process, or other etiology.  If warranted, consider follow-up CT of the thorax as this has not cleared since prior exam.    This report was flagged in Epic as abnormal.      Electronically signed by: Leann Jung  Date:    01/13/2019  Time:    18:41             Narrative:    EXAMINATION:  XR CHEST AP PORTABLE    CLINICAL HISTORY:  Sepsis;    TECHNIQUE:  Single frontal view of the chest was performed.    COMPARISON:  10/25/2017    FINDINGS:  Single AP chest radiograph demonstrates heart size within normal limits.  There are vascular calcifications seen in the aortic knob.  There is redemonstration of right basilar density when compared to the previous examination.  There is no pneumothorax or significant pleural fluid.  The bones are diffusely osteopenic.                              Medical decision making:  Given the above, this patient presents to the emergency room with a cough and high fever.  She has had waxing and waning confusion over the course last 4 days.  The influenza testing is negative the white blood count is normal. Chest x-ray reveals a persistent infiltrate in the right base.  I will do a CT scan to try to exclude neoplasm.  I will admit the patient and treat with Levaquin, anticipating that the patient will be discharged in less than 24 hr.  There is no evidence of urinary tract infection peritonitis bowel obstruction.  Patient reports that she does have a sore throat. I will have the patient evaluated by Hospital Medicine.  Patient has had some borderline low oxygen saturations with values around 91%.                Scribe Attestation:   Scribe #1: I performed the above scribed service and the documentation accurately describes the  services I performed. I attest to the accuracy of the note.    Attending Attestation:           Physician Attestation for Scribe:  Physician Attestation Statement for Scribe #1: I, Danny Marshall MD, reviewed documentation, as scribed by Hugo Benson in my presence, and it is both accurate and complete.                    Clinical Impression:   The primary encounter diagnosis was Pneumonia of right lower lobe due to infectious organism. A diagnosis of Altered mental status was also pertinent to this visit.                             Danny Marshall MD  01/13/19 1921

## 2019-01-13 NOTE — ED TRIAGE NOTES
"Pt arrived to ED via personal transport with family for altered mental status over the past 4 days, intermittently. Family explains pt becomes lost in the home and disoriented to time and situation. Family reports pt has had a recent "cold" and cough. Pts baseline is AAO x 4. Pt is confused at this time.  "

## 2019-01-14 LAB
ANION GAP SERPL CALC-SCNC: 8 MMOL/L
BASOPHILS # BLD AUTO: 0.01 K/UL
BASOPHILS NFR BLD: 0.2 %
BUN SERPL-MCNC: 15 MG/DL
CALCIUM SERPL-MCNC: 8.5 MG/DL
CHLORIDE SERPL-SCNC: 106 MMOL/L
CO2 SERPL-SCNC: 30 MMOL/L
CREAT SERPL-MCNC: 0.6 MG/DL
DIFFERENTIAL METHOD: ABNORMAL
EOSINOPHIL # BLD AUTO: 0 K/UL
EOSINOPHIL NFR BLD: 0.7 %
ERYTHROCYTE [DISTWIDTH] IN BLOOD BY AUTOMATED COUNT: 13.1 %
EST. GFR  (AFRICAN AMERICAN): >60 ML/MIN/1.73 M^2
EST. GFR  (NON AFRICAN AMERICAN): >60 ML/MIN/1.73 M^2
FERRITIN SERPL-MCNC: 140 NG/ML
GLUCOSE SERPL-MCNC: 104 MG/DL
HCT VFR BLD AUTO: 29.8 %
HGB BLD-MCNC: 9.7 G/DL
IRON SERPL-MCNC: <10 UG/DL
LACTATE SERPL-SCNC: 2 MMOL/L
LYMPHOCYTES # BLD AUTO: 0.7 K/UL
LYMPHOCYTES NFR BLD: 12.7 %
MCH RBC QN AUTO: 28.9 PG
MCHC RBC AUTO-ENTMCNC: 32.6 G/DL
MCV RBC AUTO: 89 FL
MONOCYTES # BLD AUTO: 0.7 K/UL
MONOCYTES NFR BLD: 12.4 %
NEUTROPHILS # BLD AUTO: 4.2 K/UL
NEUTROPHILS NFR BLD: 74 %
PLATELET # BLD AUTO: 152 K/UL
PMV BLD AUTO: 9.8 FL
POTASSIUM SERPL-SCNC: 3.8 MMOL/L
RBC # BLD AUTO: 3.36 M/UL
SATURATED IRON: ABNORMAL %
SODIUM SERPL-SCNC: 144 MMOL/L
TOTAL IRON BINDING CAPACITY: 272 UG/DL
TRANSFERRIN SERPL-MCNC: 184 MG/DL
WBC # BLD AUTO: 5.73 K/UL

## 2019-01-14 PROCEDURE — 82728 ASSAY OF FERRITIN: CPT

## 2019-01-14 PROCEDURE — 25000003 PHARM REV CODE 250: Performed by: EMERGENCY MEDICINE

## 2019-01-14 PROCEDURE — S4991 NICOTINE PATCH NONLEGEND: HCPCS | Performed by: INTERNAL MEDICINE

## 2019-01-14 PROCEDURE — 63600175 PHARM REV CODE 636 W HCPCS: Performed by: PHYSICIAN ASSISTANT

## 2019-01-14 PROCEDURE — 94799 UNLISTED PULMONARY SVC/PX: CPT

## 2019-01-14 PROCEDURE — 25000003 PHARM REV CODE 250: Performed by: INTERNAL MEDICINE

## 2019-01-14 PROCEDURE — 25000003 PHARM REV CODE 250: Performed by: PHYSICIAN ASSISTANT

## 2019-01-14 PROCEDURE — 25000242 PHARM REV CODE 250 ALT 637 W/ HCPCS: Performed by: NURSE PRACTITIONER

## 2019-01-14 PROCEDURE — 99223 1ST HOSP IP/OBS HIGH 75: CPT | Mod: GC,,, | Performed by: INTERNAL MEDICINE

## 2019-01-14 PROCEDURE — 99223 PR INITIAL HOSPITAL CARE,LEVL III: ICD-10-PCS | Mod: GC,,, | Performed by: INTERNAL MEDICINE

## 2019-01-14 PROCEDURE — 80048 BASIC METABOLIC PNL TOTAL CA: CPT

## 2019-01-14 PROCEDURE — 25000003 PHARM REV CODE 250: Performed by: NURSE PRACTITIONER

## 2019-01-14 PROCEDURE — 83540 ASSAY OF IRON: CPT

## 2019-01-14 PROCEDURE — 85025 COMPLETE CBC W/AUTO DIFF WBC: CPT

## 2019-01-14 PROCEDURE — 36415 COLL VENOUS BLD VENIPUNCTURE: CPT

## 2019-01-14 PROCEDURE — 94761 N-INVAS EAR/PLS OXIMETRY MLT: CPT

## 2019-01-14 PROCEDURE — 11000001 HC ACUTE MED/SURG PRIVATE ROOM

## 2019-01-14 PROCEDURE — 94640 AIRWAY INHALATION TREATMENT: CPT

## 2019-01-14 PROCEDURE — 63600175 PHARM REV CODE 636 W HCPCS: Performed by: EMERGENCY MEDICINE

## 2019-01-14 PROCEDURE — 27000221 HC OXYGEN, UP TO 24 HOURS

## 2019-01-14 PROCEDURE — 83605 ASSAY OF LACTIC ACID: CPT

## 2019-01-14 RX ORDER — DONEPEZIL HYDROCHLORIDE 10 MG/1
10 TABLET, FILM COATED ORAL NIGHTLY
Status: DISCONTINUED | OUTPATIENT
Start: 2019-01-14 | End: 2019-01-15 | Stop reason: HOSPADM

## 2019-01-14 RX ORDER — METOPROLOL SUCCINATE 25 MG/1
25 TABLET, EXTENDED RELEASE ORAL DAILY
Status: DISCONTINUED | OUTPATIENT
Start: 2019-01-15 | End: 2019-01-15 | Stop reason: HOSPADM

## 2019-01-14 RX ORDER — IPRATROPIUM BROMIDE AND ALBUTEROL SULFATE 2.5; .5 MG/3ML; MG/3ML
3 SOLUTION RESPIRATORY (INHALATION) EVERY 6 HOURS
Status: DISCONTINUED | OUTPATIENT
Start: 2019-01-15 | End: 2019-01-15 | Stop reason: HOSPADM

## 2019-01-14 RX ORDER — MEMANTINE HYDROCHLORIDE 5 MG/1
5 TABLET ORAL DAILY
Status: DISCONTINUED | OUTPATIENT
Start: 2019-01-15 | End: 2019-01-15 | Stop reason: HOSPADM

## 2019-01-14 RX ORDER — IBUPROFEN 200 MG
1 TABLET ORAL DAILY
Status: DISCONTINUED | OUTPATIENT
Start: 2019-01-14 | End: 2019-01-15 | Stop reason: HOSPADM

## 2019-01-14 RX ORDER — GUAIFENESIN 600 MG/1
1200 TABLET, EXTENDED RELEASE ORAL 2 TIMES DAILY
Status: DISCONTINUED | OUTPATIENT
Start: 2019-01-14 | End: 2019-01-15 | Stop reason: HOSPADM

## 2019-01-14 RX ORDER — FERROUS SULFATE 325(65) MG
325 TABLET, DELAYED RELEASE (ENTERIC COATED) ORAL DAILY
Status: DISCONTINUED | OUTPATIENT
Start: 2019-01-15 | End: 2019-01-15 | Stop reason: HOSPADM

## 2019-01-14 RX ORDER — IPRATROPIUM BROMIDE AND ALBUTEROL SULFATE 2.5; .5 MG/3ML; MG/3ML
3 SOLUTION RESPIRATORY (INHALATION) EVERY 4 HOURS
Status: DISCONTINUED | OUTPATIENT
Start: 2019-01-14 | End: 2019-01-14

## 2019-01-14 RX ADMIN — HYDROCODONE BITARTRATE AND ACETAMINOPHEN 1 TABLET: 5; 325 TABLET ORAL at 05:01

## 2019-01-14 RX ADMIN — PANTOPRAZOLE SODIUM 40 MG: 40 TABLET, DELAYED RELEASE ORAL at 08:01

## 2019-01-14 RX ADMIN — GUAIFENESIN 1200 MG: 600 TABLET, EXTENDED RELEASE ORAL at 08:01

## 2019-01-14 RX ADMIN — AZITHROMYCIN 250 MG: 250 TABLET, FILM COATED ORAL at 08:01

## 2019-01-14 RX ADMIN — IPRATROPIUM BROMIDE AND ALBUTEROL SULFATE 3 ML: .5; 3 SOLUTION RESPIRATORY (INHALATION) at 10:01

## 2019-01-14 RX ADMIN — HYDROCODONE BITARTRATE AND ACETAMINOPHEN 1 TABLET: 5; 325 TABLET ORAL at 11:01

## 2019-01-14 RX ADMIN — IPRATROPIUM BROMIDE AND ALBUTEROL SULFATE 3 ML: .5; 3 SOLUTION RESPIRATORY (INHALATION) at 05:01

## 2019-01-14 RX ADMIN — ATORVASTATIN CALCIUM 20 MG: 10 TABLET, FILM COATED ORAL at 08:01

## 2019-01-14 RX ADMIN — CEFTRIAXONE 1 G: 1 INJECTION, SOLUTION INTRAVENOUS at 08:01

## 2019-01-14 RX ADMIN — HYDROCODONE BITARTRATE AND ACETAMINOPHEN 1 TABLET: 5; 325 TABLET ORAL at 01:01

## 2019-01-14 RX ADMIN — GUAIFENESIN 1200 MG: 600 TABLET, EXTENDED RELEASE ORAL at 09:01

## 2019-01-14 RX ADMIN — FLUOXETINE HYDROCHLORIDE 20 MG: 20 CAPSULE ORAL at 08:01

## 2019-01-14 RX ADMIN — ENOXAPARIN SODIUM 40 MG: 100 INJECTION SUBCUTANEOUS at 04:01

## 2019-01-14 RX ADMIN — GABAPENTIN 300 MG: 300 CAPSULE ORAL at 08:01

## 2019-01-14 RX ADMIN — ROPINIROLE HYDROCHLORIDE 0.25 MG: 0.25 TABLET, FILM COATED ORAL at 08:01

## 2019-01-14 RX ADMIN — NICOTINE 1 PATCH: 21 PATCH, EXTENDED RELEASE TRANSDERMAL at 04:01

## 2019-01-14 RX ADMIN — OXYBUTYNIN CHLORIDE 5 MG: 5 TABLET ORAL at 08:01

## 2019-01-14 RX ADMIN — AMLODIPINE BESYLATE 5 MG: 5 TABLET ORAL at 08:01

## 2019-01-14 RX ADMIN — CETIRIZINE HYDROCHLORIDE 5 MG: 5 TABLET ORAL at 08:01

## 2019-01-14 RX ADMIN — LISINOPRIL 40 MG: 20 TABLET ORAL at 08:01

## 2019-01-14 RX ADMIN — POLYETHYLENE GLYCOL 3350 17 G: 17 POWDER, FOR SOLUTION ORAL at 08:01

## 2019-01-14 RX ADMIN — CALCIUM 500 MG: 500 TABLET ORAL at 08:01

## 2019-01-14 RX ADMIN — DONEPEZIL HYDROCHLORIDE 10 MG: 10 TABLET, FILM COATED ORAL at 08:01

## 2019-01-14 RX ADMIN — HYDROCODONE BITARTRATE AND ACETAMINOPHEN 1 TABLET: 5; 325 TABLET ORAL at 06:01

## 2019-01-14 NOTE — ED NOTES
Received pt and report at shift change pt was wet we changed her bed pt is very restless and disoriented. She was medicated for fever and will start antibiotics

## 2019-01-14 NOTE — SUBJECTIVE & OBJECTIVE
Past Medical History:   Diagnosis Date    Depression     GERD (gastroesophageal reflux disease)     History of radiofrequency ablation procedure for cardiac arrhythmia     Hypertension     Memory loss     Osteoporosis     Palpitations        Past Surgical History:   Procedure Laterality Date    ANKLE SURGERY Left     APPENDECTOMY       SECTION      x2    EYE SURGERY Left     HIP SURGERY      HIP SURGERY Left     HYSTERECTOMY      ROTATOR CUFF REPAIR      L arm    TONSILLECTOMY         Review of patient's allergies indicates:  No Known Allergies    Family History     None        Tobacco Use    Smoking status: Current Every Day Smoker     Packs/day: 0.50     Types: Cigarettes    Smokeless tobacco: Never Used   Substance and Sexual Activity    Alcohol use: No    Drug use: No    Sexual activity: Not on file         Review of Systems   CV: no syncope  ENT: + sore throat  Resp: per hpi  Eyes: no visual changes  Gastrointestinal: no nausea or vomiting  Integument/Breast: no rash  Musculoskeletal: no arthralgias  Neurological: no headaches  Behavioral/Psych: no confusion or depression  Heme: no bleeding    Objective:     Vital Signs (Most Recent):  Temp: 99.2 °F (37.3 °C) (19 112)  Pulse: 70 (19 112)  Resp: 19 (19 112)  BP: 135/63 (19)  SpO2: (!) 94 % (19) Vital Signs (24h Range):  Temp:  [98.3 °F (36.8 °C)-102.4 °F (39.1 °C)] 99.2 °F (37.3 °C)  Pulse:  [61-95] 70  Resp:  [16-26] 19  SpO2:  [91 %-99 %] 94 %  BP: (133-169)/(60-73) 135/63     Weight: 69.5 kg (153 lb 3.5 oz)  Body mass index is 24.73 kg/m².      Intake/Output Summary (Last 24 hours) at 2019 1352  Last data filed at 2019 0841  Gross per 24 hour   Intake 265 ml   Output --   Net 265 ml       Physical Exam  General: no distress  Eyes:  conjunctivae/corneas clear  Nose: no discharge  Neck: no jugular venous distention  Lungs:  normal respiratory effort and no wheezes, + rales right  base  Heart: regular rate and rhythm and no murmur  Abdomen: non-distended  Extremities: no cyanosis or edema, or clubbing  Skin: No rashes or lesions. good skin turgor  Neurologic: alert, oriented, thought content appropriate      Lines/Drains/Airways     Peripheral Intravenous Line                 Peripheral IV - Single Lumen 01/13/19 1744 Right Forearm less than 1 day                Significant Labs:    CBC/Anemia Profile:  Recent Labs   Lab 01/13/19 1738 01/14/19  0515   WBC 6.36 5.73   HGB 11.6* 9.7*   HCT 35.6* 29.8*    152   MCV 88 89   RDW 13.0 13.1   IRON  --  <10*   FERRITIN  --  140        Chemistries:  Recent Labs   Lab 01/13/19 1738 01/14/19  0515    144   K 3.0* 3.8   CL 98 106   CO2 33* 30*   BUN 18 15   CREATININE 0.7 0.6   CALCIUM 9.6 8.5*   ALBUMIN 3.3*  --    PROT 7.1  --    BILITOT 0.7  --    ALKPHOS 111  --    ALT 11  --    AST 19  --        ABGs: No results for input(s): PH, PCO2, HCO3, POCSATURATED, BE in the last 48 hours.    Significant Imaging:   CT: I have reviewed all pertinent results/findings within the past 24 hours and my personal findings are:  right lower lobe consolidation, mediastinal adenopathy, multi-focal ground glass nodules

## 2019-01-14 NOTE — CONSULTS
[facility]  Pulmonology  Consult Note    Patient Name: Annette Fallon  MRN: [id number]  Admission Date: 2019  Hospital Length of Stay: 0 days  Code Status: Full Code  Attending Physician: Bay Huston MD  [facility]   Principal Problem: Sepsis    Inpatient consult to Pulmonology  Consult performed by: Lucio Mireles MD  Consult ordered by: Aries Pruett PA-C        Subjective:     HPI:  The patient is a 78 yof who presents with a 2-week history of wet cough. She has difficulty mobilizing the secretions though she definitely can feel them rattling in her chest. She also has had fever. She denies hemoptysis or weight loss. She is a current smoker with a longstanding smoking history. She denies significant dyspnea at baseline. She lives with her . Her sister-in-law helps take care of her on account of her blindness.    Past Medical History:   Diagnosis Date    Depression     GERD (gastroesophageal reflux disease)     History of radiofrequency ablation procedure for cardiac arrhythmia     Hypertension     Memory loss     Osteoporosis     Palpitations        Past Surgical History:   Procedure Laterality Date    ANKLE SURGERY Left     APPENDECTOMY       SECTION      x2    EYE SURGERY Left     HIP SURGERY      HIP SURGERY Left     HYSTERECTOMY      ROTATOR CUFF REPAIR      L arm    TONSILLECTOMY         Review of patient's allergies indicates:  No Known Allergies    Family History     None        Tobacco Use    Smoking status: Current Every Day Smoker     Packs/day: 0.50     Types: Cigarettes    Smokeless tobacco: Never Used   Substance and Sexual Activity    Alcohol use: No    Drug use: No    Sexual activity: Not on file         Review of Systems   CV: no syncope  ENT: + sore throat  Resp: per hpi  Eyes: no visual changes  Gastrointestinal: no nausea or vomiting  Integument/Breast: no rash  Musculoskeletal: no  arthralgias  Neurological: no headaches  Behavioral/Psych: no confusion or depression  Heme: no bleeding    Objective:     Vital Signs (Most Recent):  Temp: 99.2 °F (37.3 °C) (01/14/19 1127)  Pulse: 70 (01/14/19 1127)  Resp: 19 (01/14/19 1127)  BP: 135/63 (01/14/19 1127)  SpO2: (!) 94 % (01/14/19 1127) Vital Signs (24h Range):  Temp:  [98.3 °F (36.8 °C)-102.4 °F (39.1 °C)] 99.2 °F (37.3 °C)  Pulse:  [61-95] 70  Resp:  [16-26] 19  SpO2:  [91 %-99 %] 94 %  BP: (133-169)/(60-73) 135/63     Weight: 69.5 kg (153 lb 3.5 oz)  Body mass index is 24.73 kg/m².      Intake/Output Summary (Last 24 hours) at 1/14/2019 1352  Last data filed at 1/14/2019 0841  Gross per 24 hour   Intake 265 ml   Output --   Net 265 ml       Physical Exam  General: no distress  Eyes:  conjunctivae/corneas clear  Nose: no discharge  Neck: no jugular venous distention  Lungs:  normal respiratory effort and no wheezes, + rales right base  Heart: regular rate and rhythm and no murmur  Abdomen: non-distended  Extremities: no cyanosis or edema, or clubbing  Skin: No rashes or lesions. good skin turgor  Neurologic: alert, oriented, thought content appropriate      Lines/Drains/Airways     Peripheral Intravenous Line                 Peripheral IV - Single Lumen 01/13/19 1744 Right Forearm less than 1 day                Significant Labs:    CBC/Anemia Profile:  Recent Labs   Lab 01/13/19 1738 01/14/19  0515   WBC 6.36 5.73   HGB 11.6* 9.7*   HCT 35.6* 29.8*    152   MCV 88 89   RDW 13.0 13.1   IRON  --  <10*   FERRITIN  --  140        Chemistries:  Recent Labs   Lab 01/13/19  1738 01/14/19  0515    144   K 3.0* 3.8   CL 98 106   CO2 33* 30*   BUN 18 15   CREATININE 0.7 0.6   CALCIUM 9.6 8.5*   ALBUMIN 3.3*  --    PROT 7.1  --    BILITOT 0.7  --    ALKPHOS 111  --    ALT 11  --    AST 19  --        ABGs: No results for input(s): PH, PCO2, HCO3, POCSATURATED, BE in the last 48 hours.    Significant Imaging:   CT: I have reviewed all pertinent  results/findings within the past 24 hours and my personal findings are:  right lower lobe consolidation, mediastinal adenopathy, multi-focal ground glass nodules    Assessment/Plan:     Tobacco abuse    Counseled the patient on smoking cessation. Patient was not particularly interested. She will need outpatient PFTs.     Pneumonia of right lower lobe due to infectious organism    Clinically, there is no doubt that the patient has pneumonia. Patient reports significant improvement with current abx regimen; would continue.     Her chest imaging & history of consolidation in the same segment of lung seen on CXR 10/2017 raises the possibility of RADHA or other neoplastic process. It is important that she get a follow up CT scan in 6-8 weeks time. I will arrange for the patient to follow up in my clinic in 6-8 weeks time & will repeat CT scan at that time.       Thank you for the consult. I will sign off at this time.     Lucio Mireles MD  Pulmonology  [facility]

## 2019-01-14 NOTE — HPI
Annette Fallon 78 y.o. female with dementia, HLD, HTN, blindness, restless leg syndrome, and ANNA presents to the hospital with a chief complaint of AMS. She reports she was brought by her family today because at home she has had intermittent episodes of increased forgetfulness, confusion, and difficulty ambulating as well as increasing falls. She has a fall 2 days ago where she missed the edge of her chair and fell onto her back. The patient and family deny head trauma or LOC with the falls. She has been ambulating since the fall. Family reports the patient has been confused and this is not her baseline. She has periods at home of not knowing her location or those around her, but then returns to baseline afterwards. The sister-in-law reports the patient has had months of ongoing intermittent episodes of tremors. Improved for a while with accupuncture at home. She has been recommended to see psychiatry outpatient by previous physician. She endorses a week of cough, SOB with ambulation, chronic back pain that is at her baseline, chronic knee pain at her norm. congestion, and a sore throat. She denies fever, chest pain, N/V/D, abdominal pain, dysuria, saddle anasthesia, bowel/bladder incontinence, HA and head pain. 1ppd smoker for 50 years.    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

## 2019-01-14 NOTE — H&P
[facility] Medicine  History & Physical    Patient Name: Annette Fallon  MRN: [id number]  Admission Date: 1/13/2019  Attending Physician: Danny Marshall MD   Primary Care Provider: [facility]         Patient information was obtained from patient and ER records.     Subjective:     Principal Problem:Sepsis    Chief Complaint:   Chief Complaint   Patient presents with    Altered Mental Status     pt arrives with family who states pt has been confused for about 3 or 4 days and got worse today; denies n/v/d at this time; denies painful urination or any urimary problems; pt legally blind in both eyes        HPI: Annette Fallon 78 y.o. female with dementia, HLD, HTN, blindness, restless leg syndrome, and ANNA presents to the hospital with a chief complaint of AMS. She reports she was brought by her family today because at home she has had intermittent episodes of increased forgetfulness, confusion, and difficulty ambulating as well as increasing falls. She has a fall 2 days ago where she missed the edge of her chair and fell onto her back. The patient and family deny head trauma or LOC with the falls. She has been ambulating since the fall. Family reports the patient has been confused and this is not her baseline. She has periods at home of not knowing her location or those around her, but then returns to baseline afterwards. The sister-in-law reports the patient has had months of ongoing intermittent episodes of tremors. Improved for a while with accupuncture at home. She has been recommended to see psychiatry outpatient by previous physician. She endorses a week of cough, SOB with ambulation, chronic back pain that is at her baseline, chronic knee pain at her norm. congestion, and a sore throat. She denies fever, chest pain, N/V/D, abdominal pain, dysuria, saddle anasthesia, bowel/bladder incontinence, HA and head pain. 1ppd smoker for 50 years.    In the ED, no leukocytosis,  lactate 2.1, UA with rare bacteria, chest x-ray with RLL, and chest CT with RLL with adenopathy and concern for bronchioalveolar carcinoma.     Past Medical History:   Diagnosis Date    Depression     GERD (gastroesophageal reflux disease)     History of radiofrequency ablation procedure for cardiac arrhythmia     Hypertension     Memory loss     Osteoporosis     Palpitations        Past Surgical History:   Procedure Laterality Date    ANKLE SURGERY Left     APPENDECTOMY       SECTION      EYE SURGERY      HIP SURGERY      HIP SURGERY Left     HYSTERECTOMY      ROTATOR CUFF REPAIR      L arm    TONSILLECTOMY         Review of patient's allergies indicates:  No Known Allergies    No current facility-administered medications on file prior to encounter.      Current Outpatient Medications on File Prior to Encounter   Medication Sig    alendronate (FOSAMAX) 70 MG tablet Take 70 mg by mouth every 7 days.    alprazolam (XANAX) 1 MG tablet Take 1 mg by mouth 2 (two) times daily.    amLODIPine (NORVASC) 5 MG tablet Take 5 mg by mouth once daily.    atorvastatin (LIPITOR) 10 MG tablet Take 20 mg by mouth once daily.     calcium carbonate (OS-JOSÉ ANTONIO) 500 mg calcium (1,250 mg) tablet Take 1 tablet by mouth once daily.    donepezil (ARICEPT) 10 MG tablet Take 10 mg by mouth every evening.    ergocalciferol (VITAMIN D2) 50,000 unit Cap Take 50,000 Units by mouth every 7 days.    escitalopram oxalate (LEXAPRO) 10 MG tablet Take 10 mg by mouth once daily.    FLUoxetine 20 MG capsule Take 20 mg by mouth once daily.    fluticasone (FLONASE) 50 mcg/actuation nasal spray 1 spray by Each Nare route once daily.    gabapentin (NEURONTIN) 100 MG capsule Take 300 mg by mouth once daily.     hydrocodone-acetaminophen 10-325mg (NORCO)  mg Tab Take by mouth.    lisinopril (PRINIVIL,ZESTRIL) 40 MG tablet Take 40 mg by mouth once daily.    loratadine (CLARITIN) 10 mg tablet Take 10 mg by mouth once  daily.    memantine (NAMENDA) 5 MG Tab Take 5 mg by mouth once daily.    metoprolol succinate (TOPROL-XL) 25 MG 24 hr tablet Take 25 mg by mouth once daily.    omeprazole (PRILOSEC) 20 MG capsule Take 20 mg by mouth once daily.    oxybutynin (DITROPAN) 5 MG Tab Take 5 mg by mouth 2 (two) times daily.    ropinirole (REQUIP) 0.25 MG tablet Take 0.25 mg by mouth 2 (two) times daily.    tizanidine (ZANAFLEX) 4 MG tablet Take 4 mg by mouth every 6 (six) hours as needed.    [DISCONTINUED] traZODone (DESYREL) 50 MG tablet Take 50 mg by mouth every evening.     Family History     None        Tobacco Use    Smoking status: Current Every Day Smoker     Packs/day: 0.50     Types: Cigarettes    Smokeless tobacco: Never Used   Substance and Sexual Activity    Alcohol use: No    Drug use: No    Sexual activity: Not on file     Review of Systems   Constitutional: Negative for chills and fever.   HENT: Positive for congestion and sore throat. Negative for nosebleeds and tinnitus.    Eyes: Negative for photophobia and visual disturbance.   Respiratory: Positive for cough and shortness of breath. Negative for wheezing.    Cardiovascular: Negative for chest pain, palpitations and leg swelling.   Gastrointestinal: Negative for abdominal distention, abdominal pain, diarrhea, nausea and vomiting.   Genitourinary: Negative for dysuria, flank pain and hematuria.        Negative for incontinence   Musculoskeletal: Positive for back pain (back pain at baseline per patient). Negative for gait problem and joint swelling.   Skin: Negative for rash and wound.   Neurological: Positive for tremors. Negative for seizures, syncope, numbness and headaches.     Objective:     Vital Signs (Most Recent):  Temp: (!) 101.9 °F (38.8 °C) (01/13/19 1854)  Pulse: 95 (01/13/19 1854)  Resp: (!) 21 (01/13/19 1830)  BP: (!) 169/73 (01/13/19 1831)  SpO2: 97 % (01/13/19 1854) Vital Signs (24h Range):  Temp:  [101.9 °F (38.8 °C)-102.4 °F (39.1 °C)] 101.9  °F (38.8 °C)  Pulse:  [80-95] 95  Resp:  [18-26] 21  SpO2:  [91 %-99 %] 97 %  BP: (147-169)/(63-73) 169/73     Weight: 64.4 kg (142 lb)  Body mass index is 22.92 kg/m².    Physical Exam   Constitutional: She appears well-developed and well-nourished. No distress.   On 2L NC   HENT:   Head: Normocephalic and atraumatic.   Right Ear: External ear normal.   Left Ear: External ear normal.   Eyes: Right eye exhibits no discharge. Left eye exhibits no discharge.   Opacified right cornea. Left globe absent   Neck: Normal range of motion. Neck supple. No thyromegaly present.   No adenopathy. Full ROM without pain   Cardiovascular: Normal rate, regular rhythm and intact distal pulses.   No murmur heard.  Pulmonary/Chest: Effort normal. No respiratory distress. She has wheezes.   Wheezing through lungs   Abdominal: Soft. Bowel sounds are normal. She exhibits no distension and no mass. There is no tenderness.   Musculoskeletal: She exhibits no edema or deformity.   Lymphadenopathy:     She has no cervical adenopathy.   Neurological: She is alert.   No sensory deficit, 5/5 flexion at knee, 5/5 plantar and dorsiflexion, thumb to finger intact    Oriented to person and place. Unsure of year.  Knew home address and birthday   Skin: Skin is warm and dry.   Psychiatric: She has a normal mood and affect. Her behavior is normal.   Nursing note and vitals reviewed.          Significant Labs:   CBC:   Recent Labs   Lab 01/13/19  1738   WBC 6.36   HGB 11.6*   HCT 35.6*        CMP:   Recent Labs   Lab 01/13/19  1738      K 3.0*   CL 98   CO2 33*   *   BUN 18   CREATININE 0.7   CALCIUM 9.6   PROT 7.1   ALBUMIN 3.3*   BILITOT 0.7   ALKPHOS 111   AST 19   ALT 11   ANIONGAP 10   EGFRNONAA >60     Cardiac Markers:   Recent Labs   Lab 01/13/19  1738   BNP 54     Coagulation:   Recent Labs   Lab 01/13/19  1738   INR 1.0     Lactic Acid:   Recent Labs   Lab 01/13/19  1738   LACTATE 2.1     Troponin:   Recent Labs   Lab  01/13/19  1738   TROPONINI 0.009     Urine Studies:   Recent Labs   Lab 01/13/19  1757   COLORU Shantel   APPEARANCEUA Clear   PHUR 5.0   SPECGRAV 1.025   PROTEINUA 1+*   GLUCUA Negative   KETONESU Negative   BILIRUBINUA Negative   OCCULTUA 1+*   NITRITE Negative   UROBILINOGEN 4.0-6.0*   LEUKOCYTESUR Negative   RBCUA 4   WBCUA 2   BACTERIA Rare   SQUAMEPITHEL 5   HYALINECASTS 0       Significant Imaging:   Imaging Results          CT Chest Without Contrast (Final result)     Abnormal  Result time 01/13/19 19:53:16    Final result by Leann Jung MD (01/13/19 19:53:16)                 Impression:      Persistent right lower lobe consolidation seen as far back as 10/25/2017.  Findings may represent simple pneumonic process or repeated aspiration pneumonia.  Bronchioloalveolar carcinoma may have a similar appearance.  Consider pulmonary consult when clinically appropriate for possible further evaluation.    Small patchy infiltrates in bilateral upper lobes in the right middle lobe.  Mediastinal adenopathy.  An additional pneumonic process may be considered.    This report was flagged in Epic as abnormal.      Electronically signed by: Leann Jung  Date:    01/13/2019  Time:    19:53             Narrative:    EXAMINATION:  CT CHEST WITHOUT CONTRAST    CLINICAL HISTORY:  Cough, new onset;    TECHNIQUE:  Low dose axial images, sagittal and coronal reformations were obtained from the thoracic inlet to the lung bases. Contrast was not administered.    COMPARISON:  None.    FINDINGS:  Focal consolidation is seen in the right lower lobe.  There are patchy small infiltrate seen in bilateral upper lobes and in the right middle lobe.  There are dependent left basilar atelectatic changes.    There is no pleural or pericardial effusion.    There is extensive mediastinal adenopathy.  There is no hilar or axillary adenopathy detected.    The heart size is within normal limits.  Moderate vascular calcifications are  present.    Spondylitic changes are noted.                               X-Ray Chest AP Portable (Final result)     Abnormal  Result time 01/13/19 18:41:50    Final result by Leann Jung MD (01/13/19 18:41:50)                 Impression:      Right lower lobe patchy density.  Findings may represent persistent aspiration, pneumonic process, or other etiology.  If warranted, consider follow-up CT of the thorax as this has not cleared since prior exam.    This report was flagged in Epic as abnormal.      Electronically signed by: Leann Jung  Date:    01/13/2019  Time:    18:41             Narrative:    EXAMINATION:  XR CHEST AP PORTABLE    CLINICAL HISTORY:  Sepsis;    TECHNIQUE:  Single frontal view of the chest was performed.    COMPARISON:  10/25/2017    FINDINGS:  Single AP chest radiograph demonstrates heart size within normal limits.  There are vascular calcifications seen in the aortic knob.  There is redemonstration of right basilar density when compared to the previous examination.  There is no pneumothorax or significant pleural fluid.  The bones are diffusely osteopenic.                                  Assessment/Plan:     * Sepsis    Patient with 2 SIRS Criteria. RLL infiltrate on chest x-ray  ABX Rocephin and Azithromycin. Unlikely aspiration given history from patient and report of sick contacts. Flu negative  Blood Cultures pending Sputum pending  No IVF as patient hypertensive  Antipyretics  Supplemental O2 wean for 92%   If no improvement in symptoms will broaden antibiotic coverage and look for other source  Pulmonary consulted given recurrent and Chest CT findings     Hyperlipidemia    Continue home atorvastatin     Altered mental status    Per patient's family waxing and waning mental status last 4 days. Currently oriented to person and place, but not time. She knew birthday and address, and is appropriate on questioning. Family reports mental status improved currently. Likely related  to underlying pneumonia and sepsis.  Fall precaution  Neuro check     Depression    Stable continue home fluoxetine     GERD without esophagitis    Stable continue home PPI     Restless leg syndrome, controlled    Continue home Requip. Unclear if source of chronic tremors. Family reports being worked up outpt currently.      Other chronic pain    Patient reports chronic back and knee pain at her baseline for many years. No new symptoms reported. Will continue home norco and gabapentin     Dementia without behavioral disturbance    Oriented to person and place currently. Will hold donepezil and memantine as patient reports discussion of stopping at previous appointments.      Tobacco abuse    Greater than 3 minutes spent counseling patient on dangers of continued tobacco abuse. PRN nicotine patch     Essential hypertension    Mildly Hypertensive initially now normotensive.   Continue home amlodipine, lisinopril. Hold metoprolol      Pneumonia of right lower lobe due to infectious organism    See above       VTE Risk Mitigation (From admission, onward)        Ordered     enoxaparin injection 40 mg  Daily      01/13/19 2035     IP VTE HIGH RISK PATIENT  Once      01/13/19 2035        VTE: lovenox  Code: full  Diet: regular  Dispo: pending improvement in respiratory status and pulmonary eval     Areis Pruett PA-C  Department of Hospital Medicine   [facility]

## 2019-01-14 NOTE — NURSING
Incentive Spirometer given to patient with instructions on it's use.   Patient was able to inhale to 2,000 on the spirometer.   Patient started coughing after performing 5 inhalations,   And stated she will try again later.   IS in patient's reach.

## 2019-01-14 NOTE — ASSESSMENT & PLAN NOTE
Continue home Requip. Unclear if source of chronic tremors. Family reports being worked up outpt currently.

## 2019-01-14 NOTE — ASSESSMENT & PLAN NOTE
Mildly Hypertensive initially now normotensive.   Continue home amlodipine, lisinopril. Hold metoprolol

## 2019-01-14 NOTE — ASSESSMENT & PLAN NOTE
Patient reports chronic back and knee pain at her baseline for many years. No new symptoms reported. Will continue home norco and gabapentin

## 2019-01-14 NOTE — PLAN OF CARE
01/14/19 1650   Discharge Assessment   Assessment Type Discharge Planning Assessment   Confirmed/corrected address and phone number on facesheet? Yes   Assessment information obtained from? Patient   Communicated expected length of stay with patient/caregiver no   Prior to hospitilization cognitive status: Alert/Oriented  (Visual impairment)   Prior to hospitalization functional status: Independent;Needs Assistance  (Needs some assist to prepare for ADLs due to vision but is able to carry out ADLs after set up for her; sister assists and transports)   Current cognitive status: Alert/Oriented  (Visual impairment)   Current Functional Status: Independent;Needs Assistance  (Some assist to set up for ADLs due to vision but is able to carry out ADLs; sister assists and transports)   Facility Arrived From: Home   Lives With sibling(s);spouse   Able to Return to Prior Arrangements yes   Is patient able to care for self after discharge? Yes  (With sister and spouse support and help)   Who are your caregiver(s) and their phone number(s)? Tim-spouse: [phone]   Patient's perception of discharge disposition home or selfcare   Readmission Within the Last 30 Days no previous admission in last 30 days   Patient currently being followed by outpatient case management? No   Patient currently receives any other outside agency services? No   Equipment Currently Used at Home none  (Patient reported that she does not have or need any DME at this time)   Do you have any problems affording any of your prescribed medications? No   Is the patient taking medications as prescribed? yes   Does the patient have transportation home? Yes   Transportation Anticipated family or friend will provide   Does the patient receive services at the Coumadin Clinic? No   Discharge Plan A Home with family   Discharge Plan B Other  (TBD)   DME Needed Upon Discharge  other (see comments)  (TBD)   Patient/Family in Agreement with Plan yes     Patient  "reported that she is independent mostly at home with spouse and sister who assist her due to vision problems; sister sets up/prepares ADLs for patient, and patient is able carry them out; no current services; no DME reported; no needs anticipated at this time.     PCP: Willa Friedman     Extended Emergency Contact Information  Primary Emergency Contact: Tim Fallon  Address: [address]           [place] [address] [facility] [place] [facility]  Home Phone: [phone]  Relation: Spouse  Secondary Emergency Contact: Carlie Rodriguez   [place]  Relation: Sister     [id number] - [place], [place] - [address] [facility] [address]  [address]  [place] [address]  Phone: [phone] Fax: [phone]    Payor: [employer] MANAGED MEDICARE / Plan: [employer] MEDICARE HMO / Product Type: Capitation /      LCSW/Patient: Discharge Planning Review   LCSW to patient's room to discuss: Help at Home and who helps the patient manage care at home   Patient identified with 2 identifiers: Name and date of birth   LCSW name and contact number written on communication board   LCSW explained role to patient   To patient's room to discuss patient managing her care at home; who will help at home with recovery   "Discharge planning begins on Admission" brochure discussed and placed in "My Health Packet" at bedside   LCSW discussed preferences for follow-up appointments; providers: if services and/or equipment is ordered   Things You are responsible for at home:  1. Getting your prescriptions filled.  2. Taking you medications as directed. DO NOT MISS ANY DOSES!  3. Going to your follow-up doctor appointments. This is important because it allows the doctor to monitor your progress and to determine if any changes need to be made to your treatment plan.   Discussed and reinforced patient responsibility for managing health care at home: following-through with scheduled follow-up " appointments or scheduling those that could not be set.

## 2019-01-14 NOTE — ASSESSMENT & PLAN NOTE
Clinically, there is no doubt that the patient has pneumonia. Patient reports significant improvement with current abx regimen; would continue.     Her chest imaging & history of consolidation in the same segment of lung seen on CXR 10/2017 raises the possibility of RADHA or other neoplastic process. It is important that she get a follow up CT scan in 6-8 weeks time. I will arrange for the patient to follow up in my clinic in 6-8 weeks time & will repeat CT scan at that time.

## 2019-01-14 NOTE — SUBJECTIVE & OBJECTIVE
Past Medical History:   Diagnosis Date    Depression     GERD (gastroesophageal reflux disease)     History of radiofrequency ablation procedure for cardiac arrhythmia     Hypertension     Memory loss     Osteoporosis     Palpitations        Past Surgical History:   Procedure Laterality Date    ANKLE SURGERY Left     APPENDECTOMY       SECTION      EYE SURGERY      HIP SURGERY      HIP SURGERY Left     HYSTERECTOMY      ROTATOR CUFF REPAIR      L arm    TONSILLECTOMY         Review of patient's allergies indicates:  No Known Allergies    No current facility-administered medications on file prior to encounter.      Current Outpatient Medications on File Prior to Encounter   Medication Sig    alendronate (FOSAMAX) 70 MG tablet Take 70 mg by mouth every 7 days.    alprazolam (XANAX) 1 MG tablet Take 1 mg by mouth 2 (two) times daily.    amLODIPine (NORVASC) 5 MG tablet Take 5 mg by mouth once daily.    atorvastatin (LIPITOR) 10 MG tablet Take 20 mg by mouth once daily.     calcium carbonate (OS-JOSÉ ANTONIO) 500 mg calcium (1,250 mg) tablet Take 1 tablet by mouth once daily.    donepezil (ARICEPT) 10 MG tablet Take 10 mg by mouth every evening.    ergocalciferol (VITAMIN D2) 50,000 unit Cap Take 50,000 Units by mouth every 7 days.    escitalopram oxalate (LEXAPRO) 10 MG tablet Take 10 mg by mouth once daily.    FLUoxetine 20 MG capsule Take 20 mg by mouth once daily.    fluticasone (FLONASE) 50 mcg/actuation nasal spray 1 spray by Each Nare route once daily.    gabapentin (NEURONTIN) 100 MG capsule Take 300 mg by mouth once daily.     hydrocodone-acetaminophen 10-325mg (NORCO)  mg Tab Take by mouth.    lisinopril (PRINIVIL,ZESTRIL) 40 MG tablet Take 40 mg by mouth once daily.    loratadine (CLARITIN) 10 mg tablet Take 10 mg by mouth once daily.    memantine (NAMENDA) 5 MG Tab Take 5 mg by mouth once daily.    metoprolol succinate (TOPROL-XL) 25 MG 24 hr tablet Take 25 mg by mouth  once daily.    omeprazole (PRILOSEC) 20 MG capsule Take 20 mg by mouth once daily.    oxybutynin (DITROPAN) 5 MG Tab Take 5 mg by mouth 2 (two) times daily.    ropinirole (REQUIP) 0.25 MG tablet Take 0.25 mg by mouth 2 (two) times daily.    tizanidine (ZANAFLEX) 4 MG tablet Take 4 mg by mouth every 6 (six) hours as needed.    [DISCONTINUED] traZODone (DESYREL) 50 MG tablet Take 50 mg by mouth every evening.     Family History     None        Tobacco Use    Smoking status: Current Every Day Smoker     Packs/day: 0.50     Types: Cigarettes    Smokeless tobacco: Never Used   Substance and Sexual Activity    Alcohol use: No    Drug use: No    Sexual activity: Not on file     Review of Systems   Constitutional: Negative for chills and fever.   HENT: Positive for congestion and sore throat. Negative for nosebleeds and tinnitus.    Eyes: Negative for photophobia and visual disturbance.   Respiratory: Positive for cough and shortness of breath. Negative for wheezing.    Cardiovascular: Negative for chest pain, palpitations and leg swelling.   Gastrointestinal: Negative for abdominal distention, abdominal pain, diarrhea, nausea and vomiting.   Genitourinary: Negative for dysuria, flank pain and hematuria.        Negative for incontinence   Musculoskeletal: Positive for back pain (back pain at baseline per patient). Negative for gait problem and joint swelling.   Skin: Negative for rash and wound.   Neurological: Positive for tremors. Negative for seizures, syncope, numbness and headaches.     Objective:     Vital Signs (Most Recent):  Temp: (!) 101.9 °F (38.8 °C) (01/13/19 1854)  Pulse: 95 (01/13/19 1854)  Resp: (!) 21 (01/13/19 1830)  BP: (!) 169/73 (01/13/19 1831)  SpO2: 97 % (01/13/19 1854) Vital Signs (24h Range):  Temp:  [101.9 °F (38.8 °C)-102.4 °F (39.1 °C)] 101.9 °F (38.8 °C)  Pulse:  [80-95] 95  Resp:  [18-26] 21  SpO2:  [91 %-99 %] 97 %  BP: (147-169)/(63-73) 169/73     Weight: 64.4 kg (142 lb)  Body mass  index is 22.92 kg/m².    Physical Exam   Constitutional: She appears well-developed and well-nourished. No distress.   On 2L NC   HENT:   Head: Normocephalic and atraumatic.   Right Ear: External ear normal.   Left Ear: External ear normal.   Eyes: Right eye exhibits no discharge. Left eye exhibits no discharge.   Opacified right cornea. Left globe absent   Neck: Normal range of motion. Neck supple. No thyromegaly present.   No adenopathy. Full ROM without pain   Cardiovascular: Normal rate, regular rhythm and intact distal pulses.   No murmur heard.  Pulmonary/Chest: Effort normal. No respiratory distress. She has wheezes.   Wheezing through lungs   Abdominal: Soft. Bowel sounds are normal. She exhibits no distension and no mass. There is no tenderness.   Musculoskeletal: She exhibits no edema or deformity.   Lymphadenopathy:     She has no cervical adenopathy.   Neurological: She is alert.   No sensory deficit, 5/5 flexion at knee, 5/5 plantar and dorsiflexion, thumb to finger intact    Oriented to person and place. Unsure of year.  Knew home address and birthday   Skin: Skin is warm and dry.   Psychiatric: She has a normal mood and affect. Her behavior is normal.   Nursing note and vitals reviewed.          Significant Labs:   CBC:   Recent Labs   Lab 01/13/19  1738   WBC 6.36   HGB 11.6*   HCT 35.6*        CMP:   Recent Labs   Lab 01/13/19  1738      K 3.0*   CL 98   CO2 33*   *   BUN 18   CREATININE 0.7   CALCIUM 9.6   PROT 7.1   ALBUMIN 3.3*   BILITOT 0.7   ALKPHOS 111   AST 19   ALT 11   ANIONGAP 10   EGFRNONAA >60     Cardiac Markers:   Recent Labs   Lab 01/13/19  1738   BNP 54     Coagulation:   Recent Labs   Lab 01/13/19  1738   INR 1.0     Lactic Acid:   Recent Labs   Lab 01/13/19  1738   LACTATE 2.1     Troponin:   Recent Labs   Lab 01/13/19  1738   TROPONINI 0.009     Urine Studies:   Recent Labs   Lab 01/13/19  1757   COLORU Shantel   APPEARANCEUA Clear   PHUR 5.0   SPECGRAV 1.025    PROTEINUA 1+*   GLUCUA Negative   KETONESU Negative   BILIRUBINUA Negative   OCCULTUA 1+*   NITRITE Negative   UROBILINOGEN 4.0-6.0*   LEUKOCYTESUR Negative   RBCUA 4   WBCUA 2   BACTERIA Rare   SQUAMEPITHEL 5   HYALINECASTS 0       Significant Imaging:   Imaging Results          CT Chest Without Contrast (Final result)     Abnormal  Result time 01/13/19 19:53:16    Final result by Leann Jung MD (01/13/19 19:53:16)                 Impression:      Persistent right lower lobe consolidation seen as far back as 10/25/2017.  Findings may represent simple pneumonic process or repeated aspiration pneumonia.  Bronchioloalveolar carcinoma may have a similar appearance.  Consider pulmonary consult when clinically appropriate for possible further evaluation.    Small patchy infiltrates in bilateral upper lobes in the right middle lobe.  Mediastinal adenopathy.  An additional pneumonic process may be considered.    This report was flagged in Epic as abnormal.      Electronically signed by: Leann Jung  Date:    01/13/2019  Time:    19:53             Narrative:    EXAMINATION:  CT CHEST WITHOUT CONTRAST    CLINICAL HISTORY:  Cough, new onset;    TECHNIQUE:  Low dose axial images, sagittal and coronal reformations were obtained from the thoracic inlet to the lung bases. Contrast was not administered.    COMPARISON:  None.    FINDINGS:  Focal consolidation is seen in the right lower lobe.  There are patchy small infiltrate seen in bilateral upper lobes and in the right middle lobe.  There are dependent left basilar atelectatic changes.    There is no pleural or pericardial effusion.    There is extensive mediastinal adenopathy.  There is no hilar or axillary adenopathy detected.    The heart size is within normal limits.  Moderate vascular calcifications are present.    Spondylitic changes are noted.                               X-Ray Chest AP Portable (Final result)     Abnormal  Result time 01/13/19 18:41:50     Final result by Leann Jung MD (01/13/19 18:41:50)                 Impression:      Right lower lobe patchy density.  Findings may represent persistent aspiration, pneumonic process, or other etiology.  If warranted, consider follow-up CT of the thorax as this has not cleared since prior exam.    This report was flagged in Epic as abnormal.      Electronically signed by: Leann Jung  Date:    01/13/2019  Time:    18:41             Narrative:    EXAMINATION:  XR CHEST AP PORTABLE    CLINICAL HISTORY:  Sepsis;    TECHNIQUE:  Single frontal view of the chest was performed.    COMPARISON:  10/25/2017    FINDINGS:  Single AP chest radiograph demonstrates heart size within normal limits.  There are vascular calcifications seen in the aortic knob.  There is redemonstration of right basilar density when compared to the previous examination.  There is no pneumothorax or significant pleural fluid.  The bones are diffusely osteopenic.

## 2019-01-14 NOTE — PLAN OF CARE
Problem: Adult Inpatient Plan of Care  Goal: Plan of Care Review   01/13/19 3949   Plan of Care Review   Plan of Care Reviewed With patient   Pt remained free of falls during current shift. Reported having generalized pain and prn received pain medicaiton. IV antibiotics are being given per MD order. Plan of care and fall precautions reviewed with pt and verbalized understanding. Bed locked, lowered, SR up x2 and call light placed within reach.

## 2019-01-14 NOTE — SUBJECTIVE & OBJECTIVE
Past Medical History:   Diagnosis Date    Depression     GERD (gastroesophageal reflux disease)     History of radiofrequency ablation procedure for cardiac arrhythmia     Hypertension     Memory loss     Osteoporosis     Palpitations        Past Surgical History:   Procedure Laterality Date    ANKLE SURGERY Left     APPENDECTOMY       SECTION      x2    EYE SURGERY Left     HIP SURGERY      HIP SURGERY Left     HYSTERECTOMY      ROTATOR CUFF REPAIR      L arm    TONSILLECTOMY         Review of patient's allergies indicates:  No Known Allergies    No current facility-administered medications on file prior to encounter.      Current Outpatient Medications on File Prior to Encounter   Medication Sig    alendronate (FOSAMAX) 70 MG tablet Take 70 mg by mouth every 7 days.    alprazolam (XANAX) 1 MG tablet Take 1 mg by mouth 2 (two) times daily.    amLODIPine (NORVASC) 5 MG tablet Take 5 mg by mouth once daily.    atorvastatin (LIPITOR) 10 MG tablet Take 20 mg by mouth once daily.     calcium carbonate (OS-JOSÉ ANTONIO) 500 mg calcium (1,250 mg) tablet Take 1 tablet by mouth once daily.    donepezil (ARICEPT) 10 MG tablet Take 10 mg by mouth every evening.    ergocalciferol (VITAMIN D2) 50,000 unit Cap Take 50,000 Units by mouth every 7 days.    escitalopram oxalate (LEXAPRO) 10 MG tablet Take 10 mg by mouth once daily.    FLUoxetine 20 MG capsule Take 20 mg by mouth once daily.    fluticasone (FLONASE) 50 mcg/actuation nasal spray 1 spray by Each Nare route once daily.    gabapentin (NEURONTIN) 100 MG capsule Take 300 mg by mouth once daily.     hydrocodone-acetaminophen 10-325mg (NORCO)  mg Tab Take by mouth.    lisinopril (PRINIVIL,ZESTRIL) 40 MG tablet Take 40 mg by mouth once daily.    loratadine (CLARITIN) 10 mg tablet Take 10 mg by mouth once daily.    memantine (NAMENDA) 5 MG Tab Take 5 mg by mouth once daily.    metoprolol succinate (TOPROL-XL) 25 MG 24 hr tablet Take 25 mg  by mouth once daily.    omeprazole (PRILOSEC) 20 MG capsule Take 20 mg by mouth once daily.    oxybutynin (DITROPAN) 5 MG Tab Take 5 mg by mouth 2 (two) times daily.    ropinirole (REQUIP) 0.25 MG tablet Take 0.25 mg by mouth 2 (two) times daily.    tizanidine (ZANAFLEX) 4 MG tablet Take 4 mg by mouth every 6 (six) hours as needed.     Family History     None        Tobacco Use    Smoking status: Current Every Day Smoker     Packs/day: 0.50     Types: Cigarettes    Smokeless tobacco: Never Used   Substance and Sexual Activity    Alcohol use: No    Drug use: No    Sexual activity: Not on file     Review of Systems   Constitutional: Negative for chills and fever.   HENT: Positive for congestion and sore throat. Negative for nosebleeds and tinnitus.    Eyes: Negative for photophobia and visual disturbance.   Respiratory: Positive for cough and shortness of breath. Negative for wheezing.    Cardiovascular: Negative for chest pain, palpitations and leg swelling.   Gastrointestinal: Negative for abdominal distention, abdominal pain, diarrhea, nausea and vomiting.   Genitourinary: Negative for dysuria, flank pain and hematuria.        Negative for incontinence   Musculoskeletal: Positive for back pain (back pain at baseline per patient). Negative for gait problem and joint swelling.   Skin: Negative for rash and wound.   Neurological: Positive for tremors. Negative for seizures, syncope, numbness and headaches.     Objective:     Vital Signs (Most Recent):  Temp: 99.2 °F (37.3 °C) (01/14/19 1127)  Pulse: 70 (01/14/19 1127)  Resp: 19 (01/14/19 1127)  BP: 135/63 (01/14/19 1127)  SpO2: (!) 94 % (01/14/19 1127) Vital Signs (24h Range):  Temp:  [98.3 °F (36.8 °C)-102.4 °F (39.1 °C)] 99.2 °F (37.3 °C)  Pulse:  [61-95] 70  Resp:  [16-26] 19  SpO2:  [91 %-99 %] 94 %  BP: (133-169)/(60-73) 135/63     Weight: 69.5 kg (153 lb 3.5 oz)  Body mass index is 24.73 kg/m².    Physical Exam   Constitutional: She is oriented to  person, place, and time. She appears well-developed and well-nourished. No distress.   On 2L NC   HENT:   Head: Normocephalic and atraumatic.   Right Ear: External ear normal.   Left Ear: External ear normal.   Eyes: Right eye exhibits no discharge. Left eye exhibits no discharge.   Opacified right cornea. Left globe absent   Neck: Normal range of motion. Neck supple. No thyromegaly present.   No adenopathy. Full ROM without pain   Cardiovascular: Normal rate, regular rhythm and intact distal pulses.   No murmur heard.  Pulmonary/Chest: Effort normal. No respiratory distress. She has no wheezes.   RML,RLL, LLL inspiratory crackles.    Abdominal: Soft. Bowel sounds are normal. She exhibits no distension and no mass. There is no tenderness.   Musculoskeletal: She exhibits no edema or deformity.   Neurological: She is alert and oriented to person, place, and time.   No sensory deficit, 5/5 flexion at knee, 5/5 plantar and dorsiflexion, thumb to finger intact       Skin: Skin is warm and dry.   Psychiatric: She has a normal mood and affect. Her behavior is normal.   Nursing note and vitals reviewed.          Significant Labs: All pertinent labs within the past 24 hours have been reviewed.    Significant Imaging: I have reviewed and interpreted all pertinent imaging results/findings within the past 24 hours.

## 2019-01-14 NOTE — ASSESSMENT & PLAN NOTE
Per patient's family waxing and waning mental status last 4 days. Currently oriented to person and place, but not time. She knew birthday and address, and is appropriate on questioning. Family reports mental status improved currently. Likely related to underlying pneumonia and sepsis.  Fall precaution  Neuro check

## 2019-01-14 NOTE — ASSESSMENT & PLAN NOTE
Greater than 3 minutes spent counseling patient on dangers of continued tobacco abuse. Nicotine patch

## 2019-01-14 NOTE — NURSING TRANSFER
Nursing Transfer Note      1/13/2019    Transfer From: ED; To: Obs room 325    Transfer via stretcher    Transfer with  O2, cardiac monitoring    Transported by Transport    Medicines sent: No    Chart send with patient: Yes    Notified: spouse and sister in law    Patient reassessed at: 2240 on 1/13/2019    Upon arrival to floor: cardiac monitor applied, patient oriented to room, call bell in reach and bed in lowest position and SR up x3. Pt reported having pain to abdomen, however pt is in no distress. IV antibiotics are infusing to PIV in right forearm. Assessment completed per doc flow sheet and pt updated regarding plan of care. Will continue to monitor pt closely.

## 2019-01-14 NOTE — PLAN OF CARE
To patient's room to discuss patient managing her care at home.      TN Role Explained.  Patient identified by using 2 identifiers:  Name and date of birth    Patient stated that her sister-in-law WILL HELP AT HOME WITH her RECOVERY.      TN name and contact info placed on the communication board    Preferred Pharmacy:    [id number]  [place], [place] - [address] AT [facility] [address]  [address] [place] [address]  Phone: [phone] Fax: [phone]       01/14/19 1649   Discharge Assessment   Assessment Type Discharge Planning Assessment   Confirmed/corrected address and phone number on facesheet? Yes   Assessment information obtained from? Patient   Expected Length of Stay (days) 2   Communicated expected length of stay with patient/caregiver yes   Prior to hospitilization cognitive status: Alert/Oriented   Prior to hospitalization functional status: Needs Assistance   Current cognitive status: Alert/Oriented   Current Functional Status: Needs Assistance   Lives With spouse;other (see comments)  (sister-in-law)   Able to Return to Prior Arrangements yes   Is patient able to care for self after discharge? No   Who are your caregiver(s) and their phone number(s)? Sister-in-law   Patient's perception of discharge disposition home or selfcare   Readmission Within the Last 30 Days no previous admission in last 30 days   Patient currently being followed by outpatient case management? No   Patient currently receives any other outside agency services? No   Equipment Currently Used at Home walker, rolling   Do you have any problems affording any of your prescribed medications? No   Is the patient taking medications as prescribed? yes   Does the patient have transportation home? Yes   Transportation Anticipated family or friend will provide   Does the patient receive services at the [facility]? No   Discharge Plan A Home with family   Discharge Plan B Home Health   DME  Needed Upon Discharge  (TBD)

## 2019-01-14 NOTE — H&P
[facility] Medicine  History & Physical    Patient Name: Annette Fallon  MRN: [id number]  Admission Date: 1/13/2019  Attending Physician: Bay Huston MD   Primary Care Provider: [facility]         Patient information was obtained from patient and ER records.     Subjective:     Principal Problem:Pneumonia of right lower lobe due to infectious organism    Chief Complaint:   Chief Complaint   Patient presents with    Altered Mental Status     pt arrives with family who states pt has been confused for about 3 or 4 days and got worse today; denies n/v/d at this time; denies painful urination or any urimary problems; pt legally blind in both eyes        HPI: Annette Fallon 78 y.o. female with dementia, HLD, HTN, blindness, restless leg syndrome, and ANNA presents to the hospital with a chief complaint of AMS. She reports she was brought by her family today because at home she has had intermittent episodes of increased forgetfulness, confusion, and difficulty ambulating as well as increasing falls. She has a fall 2 days ago where she missed the edge of her chair and fell onto her back. The patient and family deny head trauma or LOC with the falls. She has been ambulating since the fall. Family reports the patient has been confused and this is not her baseline. She has periods at home of not knowing her location or those around her, but then returns to baseline afterwards. The sister-in-law reports the patient has had months of ongoing intermittent episodes of tremors. Improved for a while with accupuncture at home. She has been recommended to see psychiatry outpatient by previous physician. She endorses a week of cough, SOB with ambulation, chronic back pain that is at her baseline, chronic knee pain at her norm. congestion, and a sore throat. She denies fever, chest pain, N/V/D, abdominal pain, dysuria, saddle anasthesia, bowel/bladder incontinence, HA and head pain. 1ppd  smoker for 50 years.    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     Past Medical History:   Diagnosis Date    Depression     GERD (gastroesophageal reflux disease)     History of radiofrequency ablation procedure for cardiac arrhythmia     Hypertension     Memory loss     Osteoporosis     Palpitations        Past Surgical History:   Procedure Laterality Date    ANKLE SURGERY Left     APPENDECTOMY       SECTION      x2    EYE SURGERY Left     HIP SURGERY      HIP SURGERY Left     HYSTERECTOMY      ROTATOR CUFF REPAIR      L arm    TONSILLECTOMY         Review of patient's allergies indicates:  No Known Allergies    No current facility-administered medications on file prior to encounter.      Current Outpatient Medications on File Prior to Encounter   Medication Sig    alendronate (FOSAMAX) 70 MG tablet Take 70 mg by mouth every 7 days.    alprazolam (XANAX) 1 MG tablet Take 1 mg by mouth 2 (two) times daily.    amLODIPine (NORVASC) 5 MG tablet Take 5 mg by mouth once daily.    atorvastatin (LIPITOR) 10 MG tablet Take 20 mg by mouth once daily.     calcium carbonate (OS-JOSÉ ANTONIO) 500 mg calcium (1,250 mg) tablet Take 1 tablet by mouth once daily.    donepezil (ARICEPT) 10 MG tablet Take 10 mg by mouth every evening.    ergocalciferol (VITAMIN D2) 50,000 unit Cap Take 50,000 Units by mouth every 7 days.    escitalopram oxalate (LEXAPRO) 10 MG tablet Take 10 mg by mouth once daily.    FLUoxetine 20 MG capsule Take 20 mg by mouth once daily.    fluticasone (FLONASE) 50 mcg/actuation nasal spray 1 spray by Each Nare route once daily.    gabapentin (NEURONTIN) 100 MG capsule Take 300 mg by mouth once daily.     hydrocodone-acetaminophen 10-325mg (NORCO)  mg Tab Take by mouth.    lisinopril (PRINIVIL,ZESTRIL) 40 MG tablet Take 40 mg by mouth once daily.     loratadine (CLARITIN) 10 mg tablet Take 10 mg by mouth once daily.    memantine (NAMENDA) 5 MG Tab Take 5 mg by mouth once daily.    metoprolol succinate (TOPROL-XL) 25 MG 24 hr tablet Take 25 mg by mouth once daily.    omeprazole (PRILOSEC) 20 MG capsule Take 20 mg by mouth once daily.    oxybutynin (DITROPAN) 5 MG Tab Take 5 mg by mouth 2 (two) times daily.    ropinirole (REQUIP) 0.25 MG tablet Take 0.25 mg by mouth 2 (two) times daily.    tizanidine (ZANAFLEX) 4 MG tablet Take 4 mg by mouth every 6 (six) hours as needed.     Family History     None        Tobacco Use    Smoking status: Current Every Day Smoker     Packs/day: 0.50     Types: Cigarettes    Smokeless tobacco: Never Used   Substance and Sexual Activity    Alcohol use: No    Drug use: No    Sexual activity: Not on file     Review of Systems   Constitutional: Negative for chills and fever.   HENT: Positive for congestion and sore throat. Negative for nosebleeds and tinnitus.    Eyes: Negative for photophobia and visual disturbance.   Respiratory: Positive for cough and shortness of breath. Negative for wheezing.    Cardiovascular: Negative for chest pain, palpitations and leg swelling.   Gastrointestinal: Negative for abdominal distention, abdominal pain, diarrhea, nausea and vomiting.   Genitourinary: Negative for dysuria, flank pain and hematuria.        Negative for incontinence   Musculoskeletal: Positive for back pain (back pain at baseline per patient). Negative for gait problem and joint swelling.   Skin: Negative for rash and wound.   Neurological: Positive for tremors. Negative for seizures, syncope, numbness and headaches.     Objective:     Vital Signs (Most Recent):  Temp: 99.2 °F (37.3 °C) (01/14/19 1127)  Pulse: 70 (01/14/19 1127)  Resp: 19 (01/14/19 1127)  BP: 135/63 (01/14/19 1127)  SpO2: (!) 94 % (01/14/19 1127) Vital Signs (24h Range):  Temp:  [98.3 °F (36.8 °C)-102.4 °F (39.1 °C)] 99.2 °F (37.3 °C)  Pulse:  [61-95]  70  Resp:  [16-26] 19  SpO2:  [91 %-99 %] 94 %  BP: (133-169)/(60-73) 135/63     Weight: 69.5 kg (153 lb 3.5 oz)  Body mass index is 24.73 kg/m².    Physical Exam   Constitutional: She is oriented to person, place, and time. She appears well-developed and well-nourished. No distress.   On 2L NC   HENT:   Head: Normocephalic and atraumatic.   Right Ear: External ear normal.   Left Ear: External ear normal.   Eyes: Right eye exhibits no discharge. Left eye exhibits no discharge.   Opacified right cornea. Left globe absent   Neck: Normal range of motion. Neck supple. No thyromegaly present.   No adenopathy. Full ROM without pain   Cardiovascular: Normal rate, regular rhythm and intact distal pulses.   No murmur heard.  Pulmonary/Chest: Effort normal. No respiratory distress. She has no wheezes.   RML,RLL, LLL inspiratory crackles.    Abdominal: Soft. Bowel sounds are normal. She exhibits no distension and no mass. There is no tenderness.   Musculoskeletal: She exhibits no edema or deformity.   Neurological: She is alert and oriented to person, place, and time.   No sensory deficit, 5/5 flexion at knee, 5/5 plantar and dorsiflexion, thumb to finger intact       Skin: Skin is warm and dry.   Psychiatric: She has a normal mood and affect. Her behavior is normal.   Nursing note and vitals reviewed.          Significant Labs: All pertinent labs within the past 24 hours have been reviewed.    Significant Imaging: I have reviewed and interpreted all pertinent imaging results/findings within the past 24 hours.    Assessment/Plan:     * Pneumonia of right lower lobe due to infectious organism    RLL similar to CXR in 10/2017.   Mental status not at baseline. Shortness of breath improving. Do not think aspiration given history from patient and report of sick contacts. Flu negative. Max tem last 24 hrs 102.4.   Will need repeat CT can in 6-8 weeks. Pulmonary will arrange follow up in clinic.    Supplemental O2 wean for 92%.  Continue azithomycin and rocephin until afebrile x 24 hrs. Add duoneb and mucinex. IS.   PT/OT consult.           Sepsis    See above         Hyperlipidemia    Continue home atorvastatin     Altered mental status    Per patient's family waxing and waning mental status last 4 days. Currently oriented to person and place, but not time. She knew birthday and address, and is appropriate on questioning. Family reports mental status improved currently. Likely related to underlying pneumonia and sepsis.  Fall precaution  Neuro check     Depression    Stable continue home fluoxetine     GERD without esophagitis    Stable continue home PPI     Restless leg syndrome, controlled    Continue home Requip. Unclear if source of chronic tremors. Family reports being worked up outpt currently.      Other chronic pain    Patient reports chronic back and knee pain at her baseline for many years. No new symptoms reported. Will continue home norco and gabapentin     Dementia without behavioral disturbance    Continue donepezil and memantine.      Tobacco abuse    Greater than 3 minutes spent counseling patient on dangers of continued tobacco abuse. Nicotine patch     Essential hypertension    Currently controlled. Continue home amlodipine, lisinopril, metoprolol        VTE Risk Mitigation (From admission, onward)        Ordered     enoxaparin injection 40 mg  Daily      01/13/19 2035     IP VTE HIGH RISK PATIENT  Once      01/13/19 2035             Aruna Francois NP  Department of Hospital Medicine   [facility]

## 2019-01-14 NOTE — HPI
The patient is a 78 yof who presents with a 2-week history of wet cough. She has difficulty mobilizing the secretions though she definitely can feel them rattling in her chest. She also has had fever. She denies hemoptysis or weight loss. She is a current smoker with a longstanding smoking history. She denies significant dyspnea at baseline. She lives with her . Her sister-in-law helps take care of her on account of her blindness.

## 2019-01-14 NOTE — ASSESSMENT & PLAN NOTE
Greater than 3 minutes spent counseling patient on dangers of continued tobacco abuse. PRN nicotine patch

## 2019-01-14 NOTE — ASSESSMENT & PLAN NOTE
Counseled the patient on smoking cessation. Patient was not particularly interested. She will need outpatient PFTs.

## 2019-01-14 NOTE — ASSESSMENT & PLAN NOTE
RLL similar to CXR in 10/2017.   Mental status not at baseline. Shortness of breath improving. Do not think aspiration given history from patient and report of sick contacts. Flu negative. Max tem last 24 hrs 102.4.   Will need repeat CT can in 6-8 weeks. Pulmonary will arrange follow up in clinic.    Supplemental O2 wean for 92%. Continue azithomycin and rocephin until afebrile x 24 hrs. Add duoneb and mucinex. IS.   PT/OT consult.

## 2019-01-14 NOTE — ASSESSMENT & PLAN NOTE
Patient with 2 SIRS Criteria. RLL infiltrate on chest x-ray  ABX Rocephin and Azithromycin. Unlikely aspiration given history from patient and report of sick contacts. Flu negative  Blood Cultures pending Sputum pending  No IVF as patient hypertensive  Antipyretics  Supplemental O2 wean for 92%   If no improvement in symptoms will broaden antibiotic coverage and look for other source  Pulmonary consulted given recurrent and Chest CT findings

## 2019-01-14 NOTE — PROGRESS NOTES
[facility] Medicine  Progress Note     Patient Name: Annette Fallon  MRN: [id number]  Admission Date: 1/13/2019  Attending Physician: Bay Huston MD   Primary Care Provider: [facility]           Patient information was obtained from patient and ER records.      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 female with dementia, HLD, HTN, blindness, restless leg syndrome, and ANNA presents to the hospital with a chief complaint of AMS. She reports she was brought by her family today because at home she has had intermittent episodes of increased forgetfulness, confusion, and difficulty ambulating as well as increasing falls. She has a fall 2 days ago where she missed the edge of her chair and fell onto her back. The patient and family deny head trauma or LOC with the falls. She has been ambulating since the fall. Family reports the patient has been confused and this is not her baseline. She has periods at home of not knowing her location or those around her, but then returns to baseline afterwards. The sister-in-law reports the patient has had months of ongoing intermittent episodes of tremors. Improved for a while with accupuncture at home. She has been recommended to see psychiatry outpatient by previous physician. She endorses a week of cough, SOB with ambulation, chronic back pain that is at her baseline, chronic knee pain at her norm. congestion, and a sore throat. She denies fever, chest pain, N/V/D, abdominal pain, dysuria, saddle anasthesia, bowel/bladder incontinence, HA and head pain.  1ppd smoker for 50 years.     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           Past Medical History:   Diagnosis Date    Depression      GERD (gastroesophageal reflux disease)      History of radiofrequency ablation procedure for cardiac arrhythmia      Hypertension      Memory loss      Osteoporosis      Palpitations                 Past Surgical History:   Procedure Laterality Date    ANKLE SURGERY Left      APPENDECTOMY         SECTION         x2    EYE SURGERY Left      HIP SURGERY        HIP SURGERY Left      HYSTERECTOMY        ROTATOR CUFF REPAIR         L arm    TONSILLECTOMY             Review of patient's allergies indicates:  No Known Allergies     No current facility-administered medications on file prior to encounter.            Current Outpatient Medications on File Prior to Encounter   Medication Sig    alendronate (FOSAMAX) 70 MG tablet Take 70 mg by mouth every 7 days.    alprazolam (XANAX) 1 MG tablet Take 1 mg by mouth 2 (two) times daily.    amLODIPine (NORVASC) 5 MG tablet Take 5 mg by mouth once daily.    atorvastatin (LIPITOR) 10 MG tablet Take 20 mg by mouth once daily.     calcium carbonate (OS-JOSÉ ANTONIO) 500 mg calcium (1,250 mg) tablet Take 1 tablet by mouth once daily.    donepezil (ARICEPT) 10 MG tablet Take 10 mg by mouth every evening.    ergocalciferol (VITAMIN D2) 50,000 unit Cap Take 50,000 Units by mouth every 7 days.    escitalopram oxalate (LEXAPRO) 10 MG tablet Take 10 mg by mouth once daily.    FLUoxetine 20 MG capsule Take 20 mg by mouth once daily.    fluticasone (FLONASE) 50 mcg/actuation nasal spray 1 spray by Each Nare route once daily.    gabapentin (NEURONTIN) 100 MG capsule Take 300 mg by mouth once daily.     hydrocodone-acetaminophen 10-325mg (NORCO)  mg Tab Take by mouth.    lisinopril (PRINIVIL,ZESTRIL)  40 MG tablet Take 40 mg by mouth once daily.    loratadine (CLARITIN) 10 mg tablet Take 10 mg by mouth once daily.    memantine (NAMENDA) 5 MG Tab Take 5 mg by mouth once daily.    metoprolol succinate (TOPROL-XL) 25 MG 24 hr tablet Take 25 mg by mouth once daily.    omeprazole (PRILOSEC) 20 MG capsule Take 20 mg by mouth once daily.    oxybutynin (DITROPAN) 5 MG Tab Take 5 mg by mouth 2 (two) times daily.    ropinirole (REQUIP) 0.25 MG tablet Take 0.25 mg by mouth 2 (two) times daily.    tizanidine (ZANAFLEX) 4 MG tablet Take 4 mg by mouth every 6 (six) hours as needed.          Family History      None                Tobacco Use    Smoking status: Current Every Day Smoker       Packs/day: 0.50       Types: Cigarettes    Smokeless tobacco: Never Used   Substance and Sexual Activity    Alcohol use: No    Drug use: No    Sexual activity: Not on file      Review of Systems   Constitutional: Negative for chills and fever.   HENT: Positive for congestion and sore throat. Negative for nosebleeds and tinnitus.    Eyes: Negative for photophobia and visual disturbance.   Respiratory: Positive for cough and shortness of breath. Negative for wheezing.    Cardiovascular: Negative for chest pain, palpitations and leg swelling.   Gastrointestinal: Negative for abdominal distention, abdominal pain, diarrhea, nausea and vomiting.   Genitourinary: Negative for dysuria, flank pain and hematuria.        Negative for incontinence   Musculoskeletal: Positive for back pain (back pain at baseline per patient). Negative for gait problem and joint swelling.   Skin: Negative for rash and wound.   Neurological: Positive for tremors. Negative for seizures, syncope, numbness and headaches.      Objective:      Vital Signs (Most Recent):  Temp: 99.2 °F (37.3 °C) (01/14/19 1127)  Pulse: 70 (01/14/19 1127)  Resp: 19 (01/14/19 1127)  BP: 135/63 (01/14/19 1127)  SpO2: (!) 94 % (01/14/19 1127) Vital Signs (24h Range):  Temp:  [98.3 °F  (36.8 °C)-102.4 °F (39.1 °C)] 99.2 °F (37.3 °C)  Pulse:  [61-95] 70  Resp:  [16-26] 19  SpO2:  [91 %-99 %] 94 %  BP: (133-169)/(60-73) 135/63      Weight: 69.5 kg (153 lb 3.5 oz)  Body mass index is 24.73 kg/m².     Physical Exam   Constitutional: She is oriented to person, place, and time. She appears well-developed and well-nourished. No distress.   On 2L NC   HENT:   Head: Normocephalic and atraumatic.   Right Ear: External ear normal.   Left Ear: External ear normal.   Eyes: Right eye exhibits no discharge. Left eye exhibits no discharge.   Opacified right cornea. Left globe absent   Neck: Normal range of motion. Neck supple. No thyromegaly present.   No adenopathy. Full ROM without pain   Cardiovascular: Normal rate, regular rhythm and intact distal pulses.   No murmur heard.  Pulmonary/Chest: Effort normal. No respiratory distress. She has no wheezes.   RML,RLL, LLL inspiratory crackles.    Abdominal: Soft. Bowel sounds are normal. She exhibits no distension and no mass. There is no tenderness.   Musculoskeletal: She exhibits no edema or deformity.   Neurological: She is alert and oriented to person, place, and time.   No sensory deficit, 5/5 flexion at knee, 5/5 plantar and dorsiflexion, thumb to finger intact       Skin: Skin is warm and dry.   Psychiatric: She has a normal mood and affect. Her behavior is normal.   Nursing note and vitals reviewed.           Significant Labs: All pertinent labs within the past 24 hours have been reviewed.     Significant Imaging: I have reviewed and interpreted all pertinent imaging results/findings within the past 24 hours.     Assessment/Plan:          * Pneumonia of right lower lobe due to infectious organism     RLL similar to CXR in 10/2017.   Mental status not at baseline. Shortness of breath improving. Do not think aspiration given history from patient and report of sick contacts. Flu negative. Max tem last 24 hrs 102.4.   Will need repeat CT can in 6-8 weeks.  Pulmonary will arrange follow up in clinic.    Supplemental O2 wean for 92%. Continue azithomycin and rocephin until afebrile x 24 hrs. Add duoneb and mucinex. IS.   PT/OT consult.             Sepsis     See above            Hyperlipidemia     Continue home atorvastatin      Altered mental status     Per patient's family waxing and waning mental status last 4 days. Currently oriented to person and place, but not time. She knew birthday and address, and is appropriate on questioning. Family reports mental status improved currently. Likely related to underlying pneumonia and sepsis.  Fall precaution  Neuro check      Depression     Stable continue home fluoxetine      GERD without esophagitis     Stable continue home PPI      Restless leg syndrome, controlled     Continue home Requip. Unclear if source of chronic tremors. Family reports being worked up outpt currently.       Other chronic pain     Patient reports chronic back and knee pain at her baseline for many years. No new symptoms reported. Will continue home norco and gabapentin      Dementia without behavioral disturbance     Continue donepezil and memantine.       Tobacco abuse     Greater than 3 minutes spent counseling patient on dangers of continued tobacco abuse. Nicotine patch      Essential hypertension     Currently controlled. Continue home amlodipine, lisinopril, metoprolol                 VTE Risk Mitigation (From admission, onward)         Ordered       enoxaparin injection 40 mg  Daily      01/13/19 2035       IP VTE HIGH RISK PATIENT  Once      01/13/19 2035                Aruna Francois NP  Department of Hospital Medicine   [facility]

## 2019-01-14 NOTE — ASSESSMENT & PLAN NOTE
Oriented to person and place currently. Will hold donepezil and memantine as patient reports discussion of stopping at previous appointments.

## 2019-01-14 NOTE — HOSPITAL COURSE
"Annette Fallon 78 y.o. female placed in observation for RLL pneumonia. In the ED, febrile max temp 102.4 with no leukocytosis, lactate 2.1, UA with rare bacteria, Chest x-ray with RLL. Chest CT with RLL consolidation with adenopathy. Levaquin started but discontinue due to "burning" then started on azithromycin and rocephin. Pulmonary consulted. On 1/14, mental status improved to baseline but reports generalized weakness. She is at baseline mentation and afebrile without leukocytosis. Patient has dementia at baseline. She is smiling and alert and appropriate this morning making jokes. She is oriented to self, time, place, and situation. Patient ambulates to the BR with nurse without incident her sat remains 94% on room air. Continue azithromycin and doxy at discharge. Add iron supplement and cough syrup.   "

## 2019-01-15 VITALS
WEIGHT: 153.25 LBS | RESPIRATION RATE: 18 BRPM | TEMPERATURE: 98 F | DIASTOLIC BLOOD PRESSURE: 61 MMHG | HEART RATE: 69 BPM | SYSTOLIC BLOOD PRESSURE: 136 MMHG | OXYGEN SATURATION: 94 % | BODY MASS INDEX: 24.63 KG/M2 | HEIGHT: 66 IN

## 2019-01-15 LAB
ANION GAP SERPL CALC-SCNC: 9 MMOL/L
BASOPHILS # BLD AUTO: 0.01 K/UL
BASOPHILS NFR BLD: 0.2 %
BUN SERPL-MCNC: 10 MG/DL
CALCIUM SERPL-MCNC: 8.8 MG/DL
CHLORIDE SERPL-SCNC: 102 MMOL/L
CO2 SERPL-SCNC: 32 MMOL/L
CREAT SERPL-MCNC: 0.6 MG/DL
DIFFERENTIAL METHOD: ABNORMAL
EOSINOPHIL # BLD AUTO: 0.1 K/UL
EOSINOPHIL NFR BLD: 1.4 %
ERYTHROCYTE [DISTWIDTH] IN BLOOD BY AUTOMATED COUNT: 13 %
EST. GFR  (AFRICAN AMERICAN): >60 ML/MIN/1.73 M^2
EST. GFR  (NON AFRICAN AMERICAN): >60 ML/MIN/1.73 M^2
GLUCOSE SERPL-MCNC: 109 MG/DL
HCT VFR BLD AUTO: 29 %
HGB BLD-MCNC: 9.2 G/DL
LYMPHOCYTES # BLD AUTO: 0.7 K/UL
LYMPHOCYTES NFR BLD: 11.7 %
MCH RBC QN AUTO: 28.2 PG
MCHC RBC AUTO-ENTMCNC: 31.7 G/DL
MCV RBC AUTO: 89 FL
MONOCYTES # BLD AUTO: 0.6 K/UL
MONOCYTES NFR BLD: 10.4 %
NEUTROPHILS # BLD AUTO: 4.3 K/UL
NEUTROPHILS NFR BLD: 76.3 %
PLATELET # BLD AUTO: 178 K/UL
PMV BLD AUTO: 10.2 FL
POTASSIUM SERPL-SCNC: 3.6 MMOL/L
RBC # BLD AUTO: 3.26 M/UL
SODIUM SERPL-SCNC: 143 MMOL/L
WBC # BLD AUTO: 5.57 K/UL

## 2019-01-15 PROCEDURE — 94761 N-INVAS EAR/PLS OXIMETRY MLT: CPT

## 2019-01-15 PROCEDURE — 25000242 PHARM REV CODE 250 ALT 637 W/ HCPCS: Performed by: INTERNAL MEDICINE

## 2019-01-15 PROCEDURE — 63600175 PHARM REV CODE 636 W HCPCS: Performed by: PHYSICIAN ASSISTANT

## 2019-01-15 PROCEDURE — 94640 AIRWAY INHALATION TREATMENT: CPT

## 2019-01-15 PROCEDURE — 25000003 PHARM REV CODE 250: Performed by: PHYSICIAN ASSISTANT

## 2019-01-15 PROCEDURE — 99900037 HC PT THERAPY SCREENING (STAT)

## 2019-01-15 PROCEDURE — 25000003 PHARM REV CODE 250: Performed by: NURSE PRACTITIONER

## 2019-01-15 PROCEDURE — 85025 COMPLETE CBC W/AUTO DIFF WBC: CPT

## 2019-01-15 PROCEDURE — S4991 NICOTINE PATCH NONLEGEND: HCPCS | Performed by: INTERNAL MEDICINE

## 2019-01-15 PROCEDURE — 94799 UNLISTED PULMONARY SVC/PX: CPT

## 2019-01-15 PROCEDURE — 36415 COLL VENOUS BLD VENIPUNCTURE: CPT

## 2019-01-15 PROCEDURE — 80048 BASIC METABOLIC PNL TOTAL CA: CPT

## 2019-01-15 PROCEDURE — 25000003 PHARM REV CODE 250: Performed by: EMERGENCY MEDICINE

## 2019-01-15 PROCEDURE — 25000003 PHARM REV CODE 250: Performed by: INTERNAL MEDICINE

## 2019-01-15 RX ORDER — GUAIFENESIN 600 MG/1
1200 TABLET, EXTENDED RELEASE ORAL 2 TIMES DAILY
COMMUNITY
Start: 2019-01-15 | End: 2020-11-09 | Stop reason: CLARIF

## 2019-01-15 RX ORDER — DOXYCYCLINE 100 MG/1
100 CAPSULE ORAL EVERY 12 HOURS
Qty: 14 CAPSULE | Refills: 0 | Status: SHIPPED | OUTPATIENT
Start: 2019-01-15 | End: 2019-01-22

## 2019-01-15 RX ORDER — FERROUS SULFATE 325(65) MG
325 TABLET, DELAYED RELEASE (ENTERIC COATED) ORAL DAILY
Refills: 0 | COMMUNITY
Start: 2019-01-16

## 2019-01-15 RX ORDER — AZITHROMYCIN 250 MG/1
250 TABLET, FILM COATED ORAL DAILY
Qty: 4 TABLET | Refills: 0 | Status: SHIPPED | OUTPATIENT
Start: 2019-01-16 | End: 2019-01-20

## 2019-01-15 RX ADMIN — IPRATROPIUM BROMIDE AND ALBUTEROL SULFATE 3 ML: .5; 3 SOLUTION RESPIRATORY (INHALATION) at 12:01

## 2019-01-15 RX ADMIN — IPRATROPIUM BROMIDE AND ALBUTEROL SULFATE 3 ML: .5; 3 SOLUTION RESPIRATORY (INHALATION) at 07:01

## 2019-01-15 RX ADMIN — FLUOXETINE HYDROCHLORIDE 20 MG: 20 CAPSULE ORAL at 08:01

## 2019-01-15 RX ADMIN — HYDROCODONE BITARTRATE AND ACETAMINOPHEN 1 TABLET: 5; 325 TABLET ORAL at 08:01

## 2019-01-15 RX ADMIN — GUAIFENESIN 1200 MG: 600 TABLET, EXTENDED RELEASE ORAL at 08:01

## 2019-01-15 RX ADMIN — GABAPENTIN 300 MG: 300 CAPSULE ORAL at 08:01

## 2019-01-15 RX ADMIN — CEFTRIAXONE 1 G: 1 INJECTION, SOLUTION INTRAVENOUS at 08:01

## 2019-01-15 RX ADMIN — PANTOPRAZOLE SODIUM 40 MG: 40 TABLET, DELAYED RELEASE ORAL at 08:01

## 2019-01-15 RX ADMIN — FERROUS SULFATE TAB EC 325 MG (65 MG FE EQUIVALENT) 325 MG: 325 (65 FE) TABLET DELAYED RESPONSE at 08:01

## 2019-01-15 RX ADMIN — HYDROCODONE BITARTRATE AND ACETAMINOPHEN 1 TABLET: 5; 325 TABLET ORAL at 04:01

## 2019-01-15 RX ADMIN — MEMANTINE HYDROCHLORIDE 5 MG: 5 TABLET ORAL at 08:01

## 2019-01-15 RX ADMIN — OXYBUTYNIN CHLORIDE 5 MG: 5 TABLET ORAL at 08:01

## 2019-01-15 RX ADMIN — LISINOPRIL 40 MG: 20 TABLET ORAL at 08:01

## 2019-01-15 RX ADMIN — CETIRIZINE HYDROCHLORIDE 5 MG: 5 TABLET ORAL at 08:01

## 2019-01-15 RX ADMIN — ROPINIROLE HYDROCHLORIDE 0.25 MG: 0.25 TABLET, FILM COATED ORAL at 08:01

## 2019-01-15 RX ADMIN — CALCIUM 500 MG: 500 TABLET ORAL at 08:01

## 2019-01-15 RX ADMIN — ATORVASTATIN CALCIUM 20 MG: 10 TABLET, FILM COATED ORAL at 08:01

## 2019-01-15 RX ADMIN — NICOTINE 1 PATCH: 21 PATCH, EXTENDED RELEASE TRANSDERMAL at 08:01

## 2019-01-15 RX ADMIN — POLYETHYLENE GLYCOL 3350 17 G: 17 POWDER, FOR SOLUTION ORAL at 08:01

## 2019-01-15 RX ADMIN — AZITHROMYCIN 250 MG: 250 TABLET, FILM COATED ORAL at 08:01

## 2019-01-15 RX ADMIN — AMLODIPINE BESYLATE 5 MG: 5 TABLET ORAL at 08:01

## 2019-01-15 RX ADMIN — METOPROLOL SUCCINATE 25 MG: 25 TABLET, EXTENDED RELEASE ORAL at 08:01

## 2019-01-15 RX ADMIN — HYDROCODONE BITARTRATE AND ACETAMINOPHEN 1 TABLET: 5; 325 TABLET ORAL at 01:01

## 2019-01-15 NOTE — DISCHARGE SUMMARY
[facility] Hospital Medicine  Discharge Summary      Patient Name: Annette Fallon  MRN: [id number]  Admission Date: 1/13/2019  Hospital Length of Stay: 1 days  Discharge Date and Time:  01/15/2019 11:37 AM  Attending Physician: Kassy Downs MD   Discharging Provider: ASHLEY Feliciano  Primary Care Provider: [facility]      HPI:   Annette Fallon 78 y.o. female with dementia, HLD, HTN, blindness, restless leg syndrome, and ANNA presents to the hospital with a chief complaint of AMS. She reports she was brought by her family today because at home she has had intermittent episodes of increased forgetfulness, confusion, and difficulty ambulating as well as increasing falls. She has a fall 2 days ago where she missed the edge of her chair and fell onto her back. The patient and family deny head trauma or LOC with the falls. She has been ambulating since the fall. Family reports the patient has been confused and this is not her baseline. She has periods at home of not knowing her location or those around her, but then returns to baseline afterwards. The sister-in-law reports the patient has had months of ongoing intermittent episodes of tremors. Improved for a while with accupuncture at home. She has been recommended to see psychiatry outpatient by previous physician. She endorses a week of cough, SOB with ambulation, chronic back pain that is at her baseline, chronic knee pain at her norm. congestion, and a sore throat. She denies fever, chest pain, N/V/D, abdominal pain, dysuria, saddle anasthesia, bowel/bladder incontinence, HA and head pain. 1ppd smoker for 50 years.    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     * No surgery found *      Hospital Course:   Annette Fallon 78 y.o. female placed in observation for RLL pneumonia. In the ED, febrile max temp  "102.4 with no leukocytosis, lactate 2.1, UA with rare bacteria, Chest x-ray with RLL. Chest CT with RLL consolidation with adenopathy. Levaquin started but discontinue due to "burning" then started on azithromycin and rocephin. Pulmonary consulted. On 1/14, mental status improved to baseline but reports generalized weakness. She is at baseline mentation and afebrile without leukocytosis. Patient has dementia at baseline. She is smiling and alert and appropriate this morning making jokes. She is oriented to self, time, place, and situation. Patient ambulates to the BR with nurse without incident her sat remains 94% on room air. Continue azithromycin and doxy at discharge. Add iron supplement and cough syrup.      Consults:   Consults (From admission, onward)        Status Ordering Provider     Inpatient consult to Pulmonology  Once     Provider:  Lucio Mireles MD    Completed SHOWERS, IDRIS SPRAGUE consult to case management  Once     Provider:  (Not yet assigned)    Acknowledged MAYRA VALERIO          * Pneumonia of right lower lobe due to infectious organism    RLL similar to CXR in 10/2017.   Mental status not at baseline. Shortness of breath improving. Do not think aspiration given history from patient and report of sick contacts. Flu negative. Max tem last 24 hrs 102.4.   Will need repeat CT can in 6-8 weeks. Pulmonary will arrange follow up in clinic.    Supplemental O2 wean for 92%. Continue azithomycin and rocephin until afebrile x 24 hrs. Add duoneb and mucinex. IS.   PT/OT consult.           Altered mental status    Per patient's family waxing and waning mental status last 4 days. Currently oriented to person and place, but not time. She knew birthday and address, and is appropriate on questioning. Family reports mental status improved currently. Likely related to underlying pneumonia and sepsis.  Fall precaution  Neuro check     Depression    Stable continue home fluoxetine     GERD without " esophagitis    Stable continue home PPI     Other chronic pain    Patient reports chronic back and knee pain at her baseline for many years. No new symptoms reported. Will continue home norco and gabapentin     Dementia without behavioral disturbance    Continue donepezil and memantine.      Tobacco abuse    Greater than 3 minutes spent counseling patient on dangers of continued tobacco abuse. Nicotine patch     Essential hypertension    Currently controlled. Continue home amlodipine, lisinopril, metoprolol      Sepsis    See above           Final Active Diagnoses:    Diagnosis Date Noted POA    PRINCIPAL PROBLEM:  Pneumonia of right lower lobe due to infectious organism [J18.1] 10/25/2017 Yes    Depression [F32.9] 01/13/2019 Yes    Altered mental status [R41.82] 01/13/2019 Unknown    Hyperlipidemia [E78.5] 01/13/2019 Unknown    Essential hypertension [I10] 10/25/2017 Yes    Other chronic pain [G89.29] 10/25/2017 Yes    Restless leg syndrome, controlled [G25.81] 10/25/2017 Yes    Tobacco abuse [Z72.0] 10/25/2017 Yes    Sepsis [A41.9] 10/25/2017 Unknown    Dementia without behavioral disturbance [F03.90] 10/25/2017 Yes    GERD without esophagitis [K21.9] 10/25/2017 Yes      Problems Resolved During this Admission:       Discharged Condition: stable    Disposition: Home or Self Care    Follow Up:  Follow-up Information     [facility]. Go on 1/16/2019.    Why:  Outpatient Services, please arrive at 10:50 AM   Contact information:  [address]  [place] [address]  [phone]             Lucio Mireles MD In 2 months.    Why:  Dr Mireles's nurse will call you with a follow up appointment in 8 weeks.  Contact information:  [facility]  [phone]               Patient Instructions:      Diet Cardiac     Activity as tolerated       Significant Diagnostic Studies: Labs:   CMP   Recent Labs   Lab 01/13/19  1738 01/14/19  0515 01/15/19  0432    144 143   K 3.0* 3.8 3.6   CL 98  106 102   CO2 33* 30* 32*   * 104 109   BUN 18 15 10   CREATININE 0.7 0.6 0.6   CALCIUM 9.6 8.5* 8.8   PROT 7.1  --   --    ALBUMIN 3.3*  --   --    BILITOT 0.7  --   --    ALKPHOS 111  --   --    AST 19  --   --    ALT 11  --   --    ANIONGAP 10 8 9   ESTGFRAFRICA >60 >60 >60   EGFRNONAA >60 >60 >60   , CBC   Recent Labs   Lab 01/13/19  1738 01/14/19  0515 01/15/19  0432   WBC 6.36 5.73 5.57   HGB 11.6* 9.7* 9.2*   HCT 35.6* 29.8* 29.0*    152 178   , INR   Lab Results   Component Value Date    INR 1.0 01/13/2019   , Lipid Panel No results found for: CHOL, HDL, LDLCALC, TRIG, CHOLHDL, Troponin   Recent Labs   Lab 01/13/19 1738   TROPONINI 0.009    and A1C: No results for input(s): HGBA1C in the last 4320 hours.    Pending Diagnostic Studies:     None         Medications:  Reconciled Home Medications:      Medication List      START taking these medications    azithromycin 250 MG tablet  Commonly known as:  Z-AI  Take 1 tablet (250 mg total) by mouth once daily. for 4 days  Start taking on:  1/16/2019     doxycycline 100 MG Cap  Commonly known as:  VIBRAMYCIN  Take 1 capsule (100 mg total) by mouth every 12 (twelve) hours. for 7 days     ferrous sulfate 325 (65 FE) MG EC tablet  Take 1 tablet (325 mg total) by mouth once daily.  Start taking on:  1/16/2019     guaiFENesin 600 mg 12 hr tablet  Commonly known as:  MUCINEX  Take 2 tablets (1,200 mg total) by mouth 2 (two) times daily.        CONTINUE taking these medications    alendronate 70 MG tablet  Commonly known as:  FOSAMAX  Take 70 mg by mouth every 7 days.     ALPRAZolam 1 MG tablet  Commonly known as:  XANAX  Take 1 mg by mouth 2 (two) times daily.     amLODIPine 5 MG tablet  Commonly known as:  NORVASC  Take 5 mg by mouth once daily.     atorvastatin 10 MG tablet  Commonly known as:  LIPITOR  Take 20 mg by mouth once daily.     calcium carbonate 500 mg calcium (1,250 mg) tablet  Commonly known as:  OS-JOSÉ ANTONIO  Take 1 tablet by mouth once daily.      donepezil 10 MG tablet  Commonly known as:  ARICEPT  Take 10 mg by mouth every evening.     escitalopram oxalate 10 MG tablet  Commonly known as:  LEXAPRO  Take 10 mg by mouth once daily.     FLUoxetine 20 MG capsule  Take 20 mg by mouth once daily.     fluticasone 50 mcg/actuation nasal spray  Commonly known as:  FLONASE  1 spray by Each Nare route once daily.     gabapentin 100 MG capsule  Commonly known as:  NEURONTIN  Take 300 mg by mouth once daily.     HYDROcodone-acetaminophen  mg per tablet  Commonly known as:  NORCO  Take by mouth.     lisinopril 40 MG tablet  Commonly known as:  PRINIVIL,ZESTRIL  Take 40 mg by mouth once daily.     loratadine 10 mg tablet  Commonly known as:  CLARITIN  Take 10 mg by mouth once daily.     memantine 5 MG Tab  Commonly known as:  NAMENDA  Take 5 mg by mouth once daily.     metoprolol succinate 25 MG 24 hr tablet  Commonly known as:  TOPROL-XL  Take 25 mg by mouth once daily.     omeprazole 20 MG capsule  Commonly known as:  PRILOSEC  Take 20 mg by mouth once daily.     oxybutynin 5 MG Tab  Commonly known as:  DITROPAN  Take 5 mg by mouth 2 (two) times daily.     rOPINIRole 0.25 MG tablet  Commonly known as:  REQUIP  Take 0.25 mg by mouth 2 (two) times daily.     tiZANidine 4 MG tablet  Commonly known as:  ZANAFLEX  Take 4 mg by mouth every 6 (six) hours as needed.     VITAMIN D2 50,000 unit Cap  Generic drug:  ergocalciferol  Take 50,000 Units by mouth every 7 days.        STOP taking these medications    traZODone 50 MG tablet  Commonly known as:  DESYREL            Indwelling Lines/Drains at time of discharge:   Lines/Drains/Airways          None          Time spent on the discharge of patient: 45 minutes  Patient was seen and examined on the date of discharge and determined to be suitable for discharge.       ADITYA Bethea, FNP-C  Hospitalist - Department of Hospital Medicine  [address] [place] [address]  Office  [phone]; Pager [phone]

## 2019-01-15 NOTE — PT/OT/SLP PROGRESS
Physical Therapy Screen      Patient Name:  Annette Fallon   MRN:  [id number]    Per Nurse Zoraida, pt is ambulating with assist without difficulty.  Pt requires assist in this setting 2/2 legally blind in both eyes.  Pt has assist of spouse and sister at home.  Pt states that she is functioning at baseline and has no skilled PT or DME needs.  PT to sign off.

## 2019-01-15 NOTE — PLAN OF CARE
Problem: Adult Inpatient Plan of Care  Goal: Plan of Care Review  Outcome: Ongoing (interventions implemented as appropriate)  Pt alert resting in bed complaint of pain prn pain med given scheduled meds given w/out difficulty; IV saline lock; pt amb to bathroom w/assist x1 free from fall/injury; pt blind [place] and slight confused; tele monitor #[id number]; safety measure maintained call light w/in reach bed locked in lowest position side rails up x2 bed alarm set; will cont to monitor

## 2019-01-15 NOTE — PROGRESS NOTES
[facility]    WRITTEN HEALTHCARE AND DISCHARGE INFORMATION    Follow-up Information     [facility]. Go on 1/16/2019.    Why:  Outpatient Services, please arrive at 10:50 AM   Contact information:  [place] [address]  [phone]             Lucio Mireles MD In 2 months.    Why:  Dr Mireles's nurse will call you with a follow up appointment in 8 weeks.  Contact information:  [facility]  [phone]                   Help at Home           [phone]  After discharge for assistance [facility] On Call Nurse Care Line 24/7  Assistance     Things You are responsible For To Manage Your Care At Home:  1.    Getting your prescriptions filled   2.    Taking your medications as directed, DO NOT MISS ANY DOSES!  3.    Going to your follow-up doctor appointment. This is important because it  allow the doctor to monitor your progress and determine if  any changes need to made to your treatment plan.     Thank you for choosing [facility] for your care.  Please answer any calls you may receive from [facility] we want to continue to support you as you manage your healthcare needs. [facility] is happy to have the opportunity to serve you.      Sincerely,  Your [facility] Team,  SINAN Johnston, ACM-RN; [facility]  [phone]

## 2019-01-15 NOTE — PLAN OF CARE
01/15/19 1405   Final Note   Assessment Type Final Discharge Note   Anticipated Discharge Disposition Home   What phone number can be called within the next 1-3 days to see how you are doing after discharge? ([phone])   Hospital Follow Up  Appt(s) scheduled? Yes   Discharge plans and expectations educations in teach back method with documentation complete? Yes   Right Care Referral Info   Post Acute Recommendation No Care   EDUCATION:  Things You are responsible For To Manage Your Care At Home:  1.    Getting your prescriptions filled   2.    Taking your medications as directed, DO NOT MISS ANY DOSES!  3.    Going to your follow-up doctor appointment. This is important because it  allow the doctor to monitor your progress and determine if  any changes need to made to your treatment plan.  Call [facility] Help at home number for new or repeated problems / symptoms   Call 911 for CP and / or SOB  Patient agreed to all above   TN unable to speak with family as they have left to go to lunch.  Zoraida Randolph will notify TN if they have any concerns or questions.    Zoraida Randolph notified that all CM needs are met

## 2019-01-15 NOTE — NURSING
Patient discharged at this time with family and all belongings. Patient iv removed, catheter intact. Patient taken to vehicle in wheelchair. Her and family verbalized understanding of all discharge instruction, stated she is ready and feeling comfortable with discharge plan.

## 2019-01-15 NOTE — NURSING
Patient clear for discharge from case management, melchor calling asking to be notified when family is on unit.

## 2019-01-15 NOTE — DISCHARGE INSTRUCTIONS
Will need repeat CT can in 6-8 weeks. Pulmonary will arrange follow up in clinic.      Complete medications as ordered  Follow all discharge instructions.  Please schedule follow up appointments as necessary      When to Call Your Doctor  Call your doctor immediately if you have any of the following:  · Severe headache  · Severe dizziness, or fainting  · Nausea or vomiting  · Fast heartbeat (higher than 100 beats per minute)  · Fever or chills  · Swollen ankles  · Weakness  · Chest Pain attacks that last longer, occur more often, or are more severe than in the past

## 2019-01-18 LAB
BACTERIA BLD CULT: NORMAL
BACTERIA BLD CULT: NORMAL

## 2019-02-12 DIAGNOSIS — J18.9 PNEUMONIA OF BOTH LUNGS DUE TO INFECTIOUS ORGANISM, UNSPECIFIED PART OF LUNG: ICD-10-CM

## 2019-02-13 ENCOUNTER — TELEPHONE (OUTPATIENT)
Dept: PULMONOLOGY | Facility: CLINIC | Age: 79
End: 2019-02-13

## 2019-02-13 NOTE — TELEPHONE ENCOUNTER
----- Message from Lucio Mireles MD sent at 2/12/2019  3:20 PM CST -----  Could you please schedule Mrs Fallon for an appointment with me on March 20th? The appt is for follow up of pneumonia. Also, she will need a CT prior to the appointment (ordered).    Thank You,  Dr Mireles  Cell: [phone]

## 2019-03-13 ENCOUNTER — HOSPITAL ENCOUNTER (OUTPATIENT)
Dept: RADIOLOGY | Facility: HOSPITAL | Age: 79
Discharge: HOME | End: 2019-03-13
Attending: INTERNAL MEDICINE
Payer: MEDICARE

## 2019-03-13 DIAGNOSIS — J18.9 PNEUMONIA OF BOTH LUNGS DUE TO INFECTIOUS ORGANISM, UNSPECIFIED PART OF LUNG: ICD-10-CM

## 2019-03-13 PROCEDURE — 71250 CT THORAX DX C-: CPT | Mod: 26,,, | Performed by: RADIOLOGY

## 2019-03-13 PROCEDURE — 71250 CT THORAX DX C-: CPT | Mod: TC

## 2019-03-13 PROCEDURE — 71250 CT CHEST WITHOUT CONTRAST: ICD-10-PCS | Mod: 26,,, | Performed by: RADIOLOGY

## 2019-03-20 ENCOUNTER — OFFICE VISIT (OUTPATIENT)
Dept: PULMONOLOGY | Facility: CLINIC | Age: 79
End: 2019-03-20
Payer: MEDICARE

## 2019-03-20 VITALS
TEMPERATURE: 98 F | SYSTOLIC BLOOD PRESSURE: 150 MMHG | DIASTOLIC BLOOD PRESSURE: 79 MMHG | WEIGHT: 147.88 LBS | OXYGEN SATURATION: 97 % | BODY MASS INDEX: 23.77 KG/M2 | HEART RATE: 93 BPM | HEIGHT: 66 IN

## 2019-03-20 DIAGNOSIS — F17.200 TOBACCO DEPENDENCE: Primary | ICD-10-CM

## 2019-03-20 DIAGNOSIS — Z72.0 TOBACCO ABUSE: ICD-10-CM

## 2019-03-20 DIAGNOSIS — R91.8 MULTIPLE PULMONARY NODULES: ICD-10-CM

## 2019-03-20 PROBLEM — J18.9 PNEUMONIA OF RIGHT LOWER LOBE DUE TO INFECTIOUS ORGANISM: Status: RESOLVED | Noted: 2017-10-25 | Resolved: 2019-03-20

## 2019-03-20 PROBLEM — A41.9 SEPSIS: Status: RESOLVED | Noted: 2017-10-25 | Resolved: 2019-03-20

## 2019-03-20 PROCEDURE — 99214 OFFICE O/P EST MOD 30 MIN: CPT | Mod: S$GLB,,, | Performed by: INTERNAL MEDICINE

## 2019-03-20 PROCEDURE — 3077F PR MOST RECENT SYSTOLIC BLOOD PRESSURE >= 140 MM HG: ICD-10-PCS | Mod: CPTII,S$GLB,, | Performed by: INTERNAL MEDICINE

## 2019-03-20 PROCEDURE — 1101F PR PT FALLS ASSESS DOC 0-1 FALLS W/OUT INJ PAST YR: ICD-10-PCS | Mod: CPTII,S$GLB,, | Performed by: INTERNAL MEDICINE

## 2019-03-20 PROCEDURE — 3078F PR MOST RECENT DIASTOLIC BLOOD PRESSURE < 80 MM HG: ICD-10-PCS | Mod: CPTII,S$GLB,, | Performed by: INTERNAL MEDICINE

## 2019-03-20 PROCEDURE — 3078F DIAST BP <80 MM HG: CPT | Mod: CPTII,S$GLB,, | Performed by: INTERNAL MEDICINE

## 2019-03-20 PROCEDURE — 99999 PR PBB SHADOW E&M-EST. PATIENT-LVL V: CPT | Mod: PBBFAC,,, | Performed by: INTERNAL MEDICINE

## 2019-03-20 PROCEDURE — 99999 PR PBB SHADOW E&M-EST. PATIENT-LVL V: ICD-10-PCS | Mod: PBBFAC,,, | Performed by: INTERNAL MEDICINE

## 2019-03-20 PROCEDURE — 99214 PR OFFICE/OUTPT VISIT, EST, LEVL IV, 30-39 MIN: ICD-10-PCS | Mod: S$GLB,,, | Performed by: INTERNAL MEDICINE

## 2019-03-20 PROCEDURE — 3077F SYST BP >= 140 MM HG: CPT | Mod: CPTII,S$GLB,, | Performed by: INTERNAL MEDICINE

## 2019-03-20 PROCEDURE — 1101F PT FALLS ASSESS-DOCD LE1/YR: CPT | Mod: CPTII,S$GLB,, | Performed by: INTERNAL MEDICINE

## 2019-03-20 NOTE — PROGRESS NOTES
History & Physical  [facility]        SUBJECTIVE:     Chief Complaint:   Multiple pulmonary nodules    History of Present Illness:  The patient is a 78 yof who presents for follow up of multiple ground glass pulmonary nodules which were identified during an acute episode of pneumonia. The patient was hospitalized in January of this year with pneumonia. She had difficulty mobilizing the secretions & also had fever. We treated her with a course of abx, & her symptoms improved. She has since returned home & returned to her normal activity level.    She is a current smoker with a longstanding smoking history. She is interested in smoking cessation.    She denies significant dyspnea at baseline. She lives with her . Her sister-in-law helps take care of her. She is fairly active though she is not able to walk at great speeds on account of her blindness.      Review of patient's allergies indicates:  No Known Allergies    Past Medical History:   Diagnosis Date    Depression     GERD (gastroesophageal reflux disease)     History of radiofrequency ablation procedure for cardiac arrhythmia     Hypertension     Memory loss     Osteoporosis     Palpitations      Past Surgical History:   Procedure Laterality Date    ANKLE SURGERY Left     APPENDECTOMY       SECTION      x2    EYE SURGERY Left     HIP SURGERY      HIP SURGERY Left     HYSTERECTOMY      ROTATOR CUFF REPAIR      L arm    TONSILLECTOMY       No family history on file.  Social History     Socioeconomic History    Marital status:      Spouse name: Not on file    Number of children: Not on file    Years of education: Not on file    Highest education level: Not on file   Social Needs    Financial resource strain: Not on file    Food insecurity - worry: Not on file    Food insecurity - inability: Not on file    Transportation needs - medical: Not on file    Transportation needs - non-medical: Not on file  "  Occupational History    Not on file   Tobacco Use    Smoking status: Current Every Day Smoker     Packs/day: 0.50     Types: Cigarettes    Smokeless tobacco: Never Used   Substance and Sexual Activity    Alcohol use: No    Drug use: No    Sexual activity: Not on file   Other Topics Concern    Not on file   Social History Narrative    Not on file       Review of Systems:  CV: no syncope  ENT: no sore throat  Resp: per hpi  Eyes: no visual changes  Gastrointestinal: no nausea or vomiting  Integument/Breast: no rash  Musculoskeletal: no arthralgias  Neurological: no headaches  Behavioral/Psych: no confusion or depression  Heme: no bleeding    OBJECTIVE:     Vital Signs  Vitals:    03/20/19 1406   BP: (!) 150/79   BP Location: Right arm   Patient Position: Sitting   Pulse: 93   Temp: 98.1 °F (36.7 °C)   TempSrc: Oral   SpO2: 97%   Weight: 67.1 kg (147 lb 14.4 oz)   Height: 5' 6" (1.676 m)     Body mass index is 23.87 kg/m².    Physical Exam:  General: no distress  Eyes:  conjunctivae/corneas clear  Nose: no discharge  Neck: no jugular venous distention  Lungs:  normal respiratory effort and no wheezes or rales, breath sounds are mildly diminished  Heart: regular rate and rhythm and no murmur  Abdomen: non-distended  Extremities: no cyanosis or edema, or clubbing  Skin: No rashes or lesions. good skin turgor  Neurologic: alert, oriented, thought content appropriate    Laboratory:  All recent laboratory tests reviewed    Chest Imaging, My Impression:   CT 3/2019: interval improvement in RLL consolidation & all ground glass nodules when compared to CT 1/2019    ASSESSMENT/PLAN:     Tobacco abuse  Counseled patient on smoking cessation. She is interested in quitting. Referral made to smoking cessation clinic.    Multiple pulmonary nodules  Ground glass nodules. Interval improvement since January suggests a benign etiology. Plan for repeat CT in one year.    Immunizations: receives immunizations from her primary " doctor (records not available to me).    Lucio Mireels MD  [facility]

## 2019-03-20 NOTE — ASSESSMENT & PLAN NOTE
Counseled patient on smoking cessation. She is interested in quitting. Referral made to smoking cessation clinic.

## 2019-03-20 NOTE — ASSESSMENT & PLAN NOTE
Ground glass nodules. Interval improvement since January suggests a benign etiology. Plan for repeat CT in one year.

## 2019-03-28 ENCOUNTER — CLINICAL SUPPORT (OUTPATIENT)
Dept: SMOKING CESSATION | Facility: CLINIC | Age: 79
End: 2019-03-28
Payer: COMMERCIAL

## 2019-03-28 DIAGNOSIS — F17.200 NICOTINE DEPENDENCE: Primary | ICD-10-CM

## 2019-03-28 PROCEDURE — 99999 PR PBB SHADOW E&M-EST. PATIENT-LVL I: ICD-10-PCS | Mod: PBBFAC,,,

## 2019-03-28 PROCEDURE — 99999 PR PBB SHADOW E&M-EST. PATIENT-LVL I: CPT | Mod: PBBFAC,,,

## 2019-03-28 PROCEDURE — 99404 PREV MED CNSL INDIV APPRX 60: CPT | Mod: S$GLB,,,

## 2019-03-28 PROCEDURE — 99404 PR PREVENT COUNSEL,INDIV,60 MIN: ICD-10-PCS | Mod: S$GLB,,,

## 2019-03-28 RX ORDER — IBUPROFEN 200 MG
1 TABLET ORAL DAILY
Qty: 14 PATCH | Refills: 0 | Status: SHIPPED | OUTPATIENT
Start: 2019-03-28 | End: 2019-04-11 | Stop reason: SDUPTHER

## 2019-03-28 NOTE — PROGRESS NOTES
Patient will be participating in biweekly tobacco cessation meetings and will begin the prescribed tobacco cessation medication regime of  21 mg nicotine patch QD . She currently smokes 20 cigarettes per day.  Pt started on rate reduction and wait time of 15 min prior to smoking. Pt's exhaled carbon monoxide level was 16 ppm as per Smokerlyzer. (non- smoker = 0-5 ppm.) Will see pt back in office in 2 weeks.

## 2019-04-11 ENCOUNTER — CLINICAL SUPPORT (OUTPATIENT)
Dept: SMOKING CESSATION | Facility: CLINIC | Age: 79
End: 2019-04-11
Payer: COMMERCIAL

## 2019-04-11 DIAGNOSIS — F17.200 NICOTINE DEPENDENCE: Primary | ICD-10-CM

## 2019-04-11 PROCEDURE — 90853 PR GROUP PSYCHOTHERAPY: ICD-10-PCS | Mod: S$GLB,,,

## 2019-04-11 PROCEDURE — 90853 GROUP PSYCHOTHERAPY: CPT | Mod: S$GLB,,,

## 2019-04-11 RX ORDER — MICONAZOLE NITRATE 2 %
CREAM (GRAM) TOPICAL
Qty: 160 EACH | Refills: 0 | Status: SHIPPED | OUTPATIENT
Start: 2019-04-11 | End: 2019-06-27 | Stop reason: SDUPTHER

## 2019-04-11 RX ORDER — IBUPROFEN 200 MG
1 TABLET ORAL DAILY
Qty: 14 PATCH | Refills: 0 | Status: SHIPPED | OUTPATIENT
Start: 2019-04-11 | End: 2019-05-15 | Stop reason: SDUPTHER

## 2019-04-15 NOTE — PROGRESS NOTES
Date:  4/11/19  Clinical Status of Patient: Outpatient   Length of Service and Code: 90 minutes - 14403   Number in Attendance: 2    Group Activities/Focus of Group:  Sharing last weeks challenges, triggers, and coping activities to remain quit and/ or keep making progress toward cessation, completion of TCRS (Tobacco Cessation Rating Scale) reviewed strategies, cues, and triggers. Introduced the negative impact of tobacco on health, the health advantages of discontinuing the use of tobacco, time line improved health changes after a quit, withdrawal issues to expect from nicotine and habit, and ways to achieve the goal of a quit.    Target symptoms:  withdrawal and medication side effects             The following were rated moderate (3) to severe (4) on TCRS:       Moderate 3: crave     Severe 4:   none    Patient's Response to Intervention: Active participation, self-disclosure, supportive of group and peers.  Patient is tobacco free since 4/6/19. Pt remains on tobacco cessation medication of  21 mg nicotine patch QD and today added 2 mg nicotine gum  PRN (1-2 per hour in place of cigarettes). No adverse effects/mental changes noted at this time. Reviewed coping strategies/habitual behavior/relapse prevention with patient. Exhaled carbon monoxide level was 0 ppm per Smokerlyzer (non- smoker = 0-5 ppm.) Will see pt back in office in 2 weeks.      Progress Toward Goals and Other Mental Status Changes: . The patient denies any abnormal behavioral or mental changes at this time.      Diagnosis: Z72.0  Plan: The patient will continue with group therapy sessions and tobacco cessation medication monitoring by CTTS.   Return to Clinic: 2 weeks.

## 2019-04-25 ENCOUNTER — CLINICAL SUPPORT (OUTPATIENT)
Dept: SMOKING CESSATION | Facility: CLINIC | Age: 79
End: 2019-04-25
Payer: COMMERCIAL

## 2019-04-25 DIAGNOSIS — F17.200 NICOTINE DEPENDENCE: Primary | ICD-10-CM

## 2019-04-25 PROCEDURE — 90853 PR GROUP PSYCHOTHERAPY: ICD-10-PCS | Mod: S$GLB,,,

## 2019-04-25 PROCEDURE — 90853 GROUP PSYCHOTHERAPY: CPT | Mod: S$GLB,,,

## 2019-04-25 NOTE — Clinical Note
Patient is tobacco free. . Pt remains on tobacco cessation medication of  21 mg nicotine patch QD and 2 mg nicotine gum PRN (1-2 per hour in place of cigarettes). No adverse effects/mental changes noted at this time. Reviewed coping strategies/habitual behavior/relapse prevention with patient. Exhaled carbon monoxide level was 0 ppm per Smokerlyzer (non- smoker = 0-5 ppm.) Will see pt back in office in 2 weeks.. Patient received her quit coin , congratulated patient on her success !

## 2019-04-28 NOTE — PROGRESS NOTES
Site: [place]  Date:  4/25/19  Clinical Status of Patient: Outpatient   Length of Service and Code: 90 minutes - 20629   Number in Attendance: 3    Group Activities/Focus of Group:  Sharing last weeks challenges, triggers, and coping activities to remain quit and/ or keep making progress toward cessation, completion of TCRS (Tobacco Cessation Rating Scale) reviewed strategies, cues, and triggers. Introduced the negative impact of tobacco on health, the health advantages of discontinuing the use of tobacco, time line improved health changes after a quit, withdrawal issues to expect from nicotine and habit, and ways to achieve the goal of a quit.    Target symptoms:  withdrawal and medication side effects             The following were rated moderate (3) to severe (4) on TCRS:       Moderate 3: none     Severe 4:   none    Patient's Response to Intervention: Active participation, self-disclosure, supportive of group and peers. . Patient is tobacco free. . Pt remains on tobacco cessation medication of  21 mg nicotine patch QD and 2 mg nicotine gum PRN (1-2 per hour in place of cigarettes). No adverse effects/mental changes noted at this time. Reviewed coping strategies/habitual behavior/relapse prevention with patient. Exhaled carbon monoxide level was 0 ppm per Smokerlyzer (non- smoker = 0-5 ppm.) Will see pt back in office in 2 weeks.. Patient received her quit coin , congratulated patient on her success !      Progress Toward Goals and Other Mental Status Changes: Pt will continue with rate reduction plan and wait times prior to smoking. The patient denies any abnormal behavioral or mental changes at this time.      Diagnosis: Z72.0  Plan: The patient will continue with group therapy sessions and tobacco cessation medication monitoring by CTTS.   Return to Clinic: 1 week

## 2019-05-15 ENCOUNTER — CLINICAL SUPPORT (OUTPATIENT)
Dept: SMOKING CESSATION | Facility: CLINIC | Age: 79
End: 2019-05-15
Payer: COMMERCIAL

## 2019-05-15 DIAGNOSIS — F17.200 NICOTINE DEPENDENCE: ICD-10-CM

## 2019-05-15 PROCEDURE — 99407 BEHAV CHNG SMOKING > 10 MIN: CPT | Mod: S$GLB,,,

## 2019-05-15 PROCEDURE — 99407 PR TOBACCO USE CESSATION INTENSIVE >10 MINUTES: ICD-10-PCS | Mod: S$GLB,,,

## 2019-05-15 RX ORDER — IBUPROFEN 200 MG
1 TABLET ORAL DAILY
Qty: 14 PATCH | Refills: 0 | Status: SHIPPED | OUTPATIENT
Start: 2019-05-15 | End: 2019-05-29

## 2019-05-29 ENCOUNTER — CLINICAL SUPPORT (OUTPATIENT)
Dept: SMOKING CESSATION | Facility: CLINIC | Age: 79
End: 2019-05-29
Payer: COMMERCIAL

## 2019-05-29 DIAGNOSIS — F17.200 NICOTINE DEPENDENCE: Primary | ICD-10-CM

## 2019-05-29 PROCEDURE — 90853 GROUP PSYCHOTHERAPY: CPT | Mod: S$GLB,,,

## 2019-05-29 PROCEDURE — 90853 PR GROUP PSYCHOTHERAPY: ICD-10-PCS | Mod: S$GLB,,,

## 2019-05-29 RX ORDER — IBUPROFEN 200 MG
1 TABLET ORAL DAILY
Qty: 14 PATCH | Refills: 0 | Status: SHIPPED | OUTPATIENT
Start: 2019-05-29 | End: 2019-06-12

## 2019-05-29 NOTE — Clinical Note
Patient is tobacco free since 4/5/19 . Pt remains on tobacco cessation medication of  21 mg nicotine patch QD today weaned down to 14 mg.  No adverse effects/mental changes noted at this time. Reviewed coping strategies/habitual behavior/relapse prevention with patient. Exhaled carbon monoxide level was 0 ppm per Smokerlyzer (non- smoker = 0-5 ppm.) Will see pt back in office in 2 weeks.

## 2019-05-30 NOTE — PROGRESS NOTES
Site: [place]  Date:  5/29/19  Clinical Status of Patient: Outpatient   Length of Service and Code: 90 minutes - 79721   Number in Attendance: 4    Group Activities/Focus of Group:  Sharing last weeks challenges, triggers, and coping activities to remain quit and/ or keep making progress toward cessation, completion of TCRS (Tobacco Cessation Rating Scale) reviewed strategies, cues, and triggers. Introduced the negative impact of tobacco on health, the health advantages of discontinuing the use of tobacco, time line improved health changes after a quit, withdrawal issues to expect from nicotine and habit, and ways to achieve the goal of a quit.    Target symptoms:  withdrawal and medication side effects             The following were rated moderate (3) to severe (4) on TCRS:       Moderate 3: none     Severe 4:   none    Patient's Response to Intervention: Active participation, self-disclosure, supportive of group and peers. Patient is tobacco free since 4/5/19 . Pt remains on tobacco cessation medication of  21 mg nicotine patch QD today weaned down to 14 mg.  No adverse effects/mental changes noted at this time. Reviewed coping strategies/habitual behavior/relapse prevention with patient. Exhaled carbon monoxide level was 0 ppm per Smokerlyzer (non- smoker = 0-5 ppm.) Will see pt back in office in 2 weeks.      Progress Toward Goals and Other Mental Status Changes: The patient denies any abnormal behavioral or mental changes at this time.      Diagnosis: Z72.0  Plan: The patient will continue with group therapy sessions and tobacco cessation medication monitoring by CTTS.   Return to Clinic: 2 weeks

## 2019-06-12 ENCOUNTER — CLINICAL SUPPORT (OUTPATIENT)
Dept: SMOKING CESSATION | Facility: CLINIC | Age: 79
End: 2019-06-12
Payer: COMMERCIAL

## 2019-06-12 DIAGNOSIS — F17.200 NICOTINE DEPENDENCE: Primary | ICD-10-CM

## 2019-06-12 PROCEDURE — 90853 PR GROUP PSYCHOTHERAPY: ICD-10-PCS | Mod: S$GLB,,,

## 2019-06-12 PROCEDURE — 99999 PR PBB SHADOW E&M-EST. PATIENT-LVL I: CPT | Mod: PBBFAC,,,

## 2019-06-12 PROCEDURE — 90853 GROUP PSYCHOTHERAPY: CPT | Mod: S$GLB,,,

## 2019-06-12 PROCEDURE — 99999 PR PBB SHADOW E&M-EST. PATIENT-LVL I: ICD-10-PCS | Mod: PBBFAC,,,

## 2019-06-16 NOTE — PROGRESS NOTES
Site: [place]  Date:  6/12/19  Clinical Status of Patient: Outpatient   Length of Service and Code: 90 minutes - 67491   Number in Attendance: 3    Group Activities/Focus of Group:  Sharing last weeks challenges, triggers, and coping activities to remain quit and/ or keep making progress toward cessation, completion of TCRS (Tobacco Cessation Rating Scale) reviewed strategies, cues, and triggers. Introduced the negative impact of tobacco on health, the health advantages of discontinuing the use of tobacco, time line improved health changes after a quit, withdrawal issues to expect from nicotine and habit, and ways to achieve the goal of a quit.    Target symptoms:  withdrawal and medication side effects             The following were rated moderate (3) to severe (4) on TCRS:       Moderate 3: none     Severe 4:   none    Patient's Response to Intervention: Active participation, self-disclosure, supportive of group and peers. Pt seen in office today. Patient remains  tobacco free . Pt no longer using any tobacco cessation medications . Reviewed coping strategies/habitual behavior/relapse prevention with patient. Exhaled carbon monoxide level was 0 ppm per Smokerlyzer (non- smoker = 0-5 ppm.)   Will see pt back in group 1 week.     Progress Toward Goals and Other Mental Status Changes: Pt will continue with rate reduction plan and wait times prior to smoking. The patient denies any abnormal behavioral or mental changes at this time.      Diagnosis: Z72.0  Plan: The patient will continue with group therapy sessions and monitoring by CTTS.   Return to Clinic: 1 week

## 2019-06-27 ENCOUNTER — CLINICAL SUPPORT (OUTPATIENT)
Dept: SMOKING CESSATION | Facility: CLINIC | Age: 79
End: 2019-06-27
Payer: COMMERCIAL

## 2019-06-27 DIAGNOSIS — F17.200 NICOTINE DEPENDENCE: ICD-10-CM

## 2019-06-27 PROCEDURE — 99407 PR TOBACCO USE CESSATION INTENSIVE >10 MINUTES: ICD-10-PCS | Mod: S$GLB,,,

## 2019-06-27 PROCEDURE — 99407 BEHAV CHNG SMOKING > 10 MIN: CPT | Mod: S$GLB,,,

## 2019-06-27 RX ORDER — MICONAZOLE NITRATE 2 %
CREAM (GRAM) TOPICAL
Qty: 160 EACH | Refills: 0 | Status: SHIPPED | OUTPATIENT
Start: 2019-06-27 | End: 2019-10-21 | Stop reason: SDUPTHER

## 2019-07-24 DIAGNOSIS — F17.200 NICOTINE DEPENDENCE: ICD-10-CM

## 2019-07-24 RX ORDER — MICONAZOLE NITRATE 2 %
CREAM (GRAM) TOPICAL
Refills: 0 | OUTPATIENT
Start: 2019-07-24

## 2019-10-18 ENCOUNTER — CLINICAL SUPPORT (OUTPATIENT)
Dept: SMOKING CESSATION | Facility: CLINIC | Age: 79
End: 2019-10-18
Payer: COMMERCIAL

## 2019-10-18 DIAGNOSIS — F17.200 NICOTINE DEPENDENCE: Primary | ICD-10-CM

## 2019-10-18 PROCEDURE — 99407 PR TOBACCO USE CESSATION INTENSIVE >10 MINUTES: ICD-10-PCS | Mod: S$GLB,,,

## 2019-10-18 PROCEDURE — 99407 BEHAV CHNG SMOKING > 10 MIN: CPT | Mod: S$GLB,,,

## 2019-10-18 NOTE — PROGRESS NOTES
Successful contact with patient regarding tobacco cessation quit #1. Pt states, she remain tobacco free, she continue to have periodic cravings, and she need to return to the program for support. Pt commended for the accomplishment thus far and scheduled for quit #2 on 10/21/2019. Pt informed of her benefit status, future telephone follow ups, and contact information. Will update the tobacco cessation smart form for 3-6 months on quit #1.

## 2019-10-21 ENCOUNTER — CLINICAL SUPPORT (OUTPATIENT)
Dept: SMOKING CESSATION | Facility: CLINIC | Age: 79
End: 2019-10-21
Payer: COMMERCIAL

## 2019-10-21 DIAGNOSIS — F17.200 NICOTINE DEPENDENCE: ICD-10-CM

## 2019-10-21 PROCEDURE — 99999 PR PBB SHADOW E&M-EST. PATIENT-LVL I: CPT | Mod: PBBFAC,,,

## 2019-10-21 PROCEDURE — 99999 PR PBB SHADOW E&M-EST. PATIENT-LVL I: ICD-10-PCS | Mod: PBBFAC,,,

## 2019-10-21 PROCEDURE — 99404 PR PREVENT COUNSEL,INDIV,60 MIN: ICD-10-PCS | Mod: S$GLB,,,

## 2019-10-21 PROCEDURE — 99404 PREV MED CNSL INDIV APPRX 60: CPT | Mod: S$GLB,,,

## 2019-10-21 RX ORDER — MICONAZOLE NITRATE 2 %
CREAM (GRAM) TOPICAL
Qty: 190 EACH | Refills: 0 | Status: SHIPPED | OUTPATIENT
Start: 2019-10-21 | End: 2020-11-09 | Stop reason: CLARIF

## 2019-10-23 NOTE — PROGRESS NOTES
Patient quit 6 months ago but she has been having some increase craving and she is fearful she may return to smoking .  She contributes a lot of these feelings to her boredom because she is blind and alone frequently. Her nephew lives in the home with her and he is a smoker.  She states he will give her a cigarette if she asked him to, but so far she has resisted.  We discussed was to work on the boredom and gave her 2 mg nicotine gum to have on hand to help her with her urges. She understands she can call me any time.

## 2019-10-28 ENCOUNTER — CLINICAL SUPPORT (OUTPATIENT)
Dept: SMOKING CESSATION | Facility: CLINIC | Age: 79
End: 2019-10-28
Payer: COMMERCIAL

## 2019-10-28 DIAGNOSIS — F17.200 NICOTINE DEPENDENCE: Primary | ICD-10-CM

## 2019-10-28 PROCEDURE — 99999 PR PBB SHADOW E&M-EST. PATIENT-LVL I: ICD-10-PCS | Mod: PBBFAC,,,

## 2019-10-28 PROCEDURE — 99402 PREV MED CNSL INDIV APPRX 30: CPT | Mod: S$GLB,,,

## 2019-10-28 PROCEDURE — 99402 PR PREVENT COUNSEL,INDIV,30 MIN: ICD-10-PCS | Mod: S$GLB,,,

## 2019-10-28 PROCEDURE — 99999 PR PBB SHADOW E&M-EST. PATIENT-LVL I: CPT | Mod: PBBFAC,,,

## 2019-10-28 NOTE — PROGRESS NOTES
Individual Follow-Up Form    10/28/2019    Quit Date: 4/6/19    Clinical Status of Patient: Outpatient    Length of Service: 30 minutes    Continuing Medication: yes  Nicotine gum    Other Medications: none     Target Symptoms: Withdrawal and medication side effects. The following were  rated moderate (3) to severe (4) on TCRS:  · Moderate (3): none  · Severe (4): none    Comments:Telephonic visit .   Patient is tobacco free.  Pt remains on tobacco cessation medication of  2 mg nicotine  gum PRN (1-2 per hour in place of cigarettes.)  She just received the gum 3 days ago.  No adverse effects noted at this time. She said the gum is helping her cope with her craving, she lives with her nephew that smokes.  Congratulated patient on her contunued success Reviewed coping strategies/habitual behavior/relapse prevention with patient.  Will see pt back in office in 2 weeks.     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

## 2019-11-11 ENCOUNTER — CLINICAL SUPPORT (OUTPATIENT)
Dept: SMOKING CESSATION | Facility: CLINIC | Age: 79
End: 2019-11-11
Payer: COMMERCIAL

## 2019-11-11 DIAGNOSIS — F17.200 NICOTINE DEPENDENCE: Primary | ICD-10-CM

## 2019-11-11 PROCEDURE — 99402 PREV MED CNSL INDIV APPRX 30: CPT | Mod: S$GLB,,,

## 2019-11-11 PROCEDURE — 99999 PR PBB SHADOW E&M-EST. PATIENT-LVL I: ICD-10-PCS | Mod: PBBFAC,,,

## 2019-11-11 PROCEDURE — 99402 PR PREVENT COUNSEL,INDIV,30 MIN: ICD-10-PCS | Mod: S$GLB,,,

## 2019-11-11 PROCEDURE — 99999 PR PBB SHADOW E&M-EST. PATIENT-LVL I: CPT | Mod: PBBFAC,,,

## 2019-11-11 NOTE — PROGRESS NOTES
Individual Follow-Up Form    11/11/2019    Quit Date: 4/6/19    Clinical Status of Patient: Outpatient    Length of Service: 30 minutes    Continuing Medication: yes  Nicotine gum    Other Medications: none     Target Symptoms: Withdrawal and medication side effects. The following were rated moderate (3) to severe (4) on TCRS:  · Moderate (3): none  · Severe (4): none    Comments: Telephonic visit .   Patient is tobacco free.  Pt remains on tobacco cessation medication of  2 mg nicotine gum PRN (1-2 per hour in place of cigarettes.) No adverse effects noted at this time. Congratulated patient on her success Reviewed coping strategies/habitual behavior/relapse prevention with patient.  Will see pt back in office in 2 weeks.     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

## 2019-12-26 ENCOUNTER — HOSPITAL ENCOUNTER (EMERGENCY)
Facility: HOSPITAL | Age: 79
Discharge: HOME | End: 2019-12-26
Attending: EMERGENCY MEDICINE
Payer: MEDICARE

## 2019-12-26 VITALS
SYSTOLIC BLOOD PRESSURE: 184 MMHG | BODY MASS INDEX: 25.71 KG/M2 | RESPIRATION RATE: 20 BRPM | TEMPERATURE: 100 F | OXYGEN SATURATION: 94 % | HEART RATE: 92 BPM | WEIGHT: 160 LBS | HEIGHT: 66 IN | DIASTOLIC BLOOD PRESSURE: 91 MMHG

## 2019-12-26 DIAGNOSIS — M62.830 BACK SPASM: Primary | ICD-10-CM

## 2019-12-26 PROCEDURE — 96372 THER/PROPH/DIAG INJ SC/IM: CPT

## 2019-12-26 PROCEDURE — 99284 EMERGENCY DEPT VISIT MOD MDM: CPT | Mod: 25

## 2019-12-26 PROCEDURE — 63600175 PHARM REV CODE 636 W HCPCS: Performed by: EMERGENCY MEDICINE

## 2019-12-26 PROCEDURE — 25000003 PHARM REV CODE 250: Performed by: EMERGENCY MEDICINE

## 2019-12-26 RX ORDER — DEXAMETHASONE SODIUM PHOSPHATE 4 MG/ML
12 INJECTION, SOLUTION INTRA-ARTICULAR; INTRALESIONAL; INTRAMUSCULAR; INTRAVENOUS; SOFT TISSUE
Status: COMPLETED | OUTPATIENT
Start: 2019-12-26 | End: 2019-12-26

## 2019-12-26 RX ORDER — BACLOFEN 10 MG/1
10 TABLET ORAL 3 TIMES DAILY
Qty: 90 TABLET | Refills: 0 | Status: SHIPPED | OUTPATIENT
Start: 2019-12-26 | End: 2020-12-16 | Stop reason: CLARIF

## 2019-12-26 RX ORDER — FAMOTIDINE 20 MG/1
20 TABLET, FILM COATED ORAL 2 TIMES DAILY
Qty: 60 TABLET | Refills: 0 | Status: SHIPPED | OUTPATIENT
Start: 2019-12-26 | End: 2020-12-25

## 2019-12-26 RX ORDER — MELOXICAM 15 MG/1
15 TABLET ORAL DAILY
Qty: 30 TABLET | Refills: 0 | Status: SHIPPED | OUTPATIENT
Start: 2019-12-26 | End: 2020-11-09 | Stop reason: CLARIF

## 2019-12-26 RX ORDER — BACLOFEN 10 MG/1
10 TABLET ORAL ONCE
Status: COMPLETED | OUTPATIENT
Start: 2019-12-26 | End: 2019-12-26

## 2019-12-26 RX ORDER — KETOROLAC TROMETHAMINE 30 MG/ML
30 INJECTION, SOLUTION INTRAMUSCULAR; INTRAVENOUS
Status: COMPLETED | OUTPATIENT
Start: 2019-12-26 | End: 2019-12-26

## 2019-12-26 RX ADMIN — DEXAMETHASONE SODIUM PHOSPHATE 12 MG: 4 INJECTION, SOLUTION INTRA-ARTICULAR; INTRALESIONAL; INTRAMUSCULAR; INTRAVENOUS; SOFT TISSUE at 01:12

## 2019-12-26 RX ADMIN — KETOROLAC TROMETHAMINE 30 MG: 30 INJECTION, SOLUTION INTRAMUSCULAR; INTRAVENOUS at 01:12

## 2019-12-26 RX ADMIN — BACLOFEN 10 MG: 10 TABLET ORAL at 01:12

## 2019-12-26 NOTE — ED PROVIDER NOTES
Encounter Date: 2019       History     Chief Complaint   Patient presents with    Flank Pain     Pt reports 1 day hx of R flank pain that woke her from sleep. Pt denies dysuria at this time. States it hurts to inhale.      79 y.o. female Past Medical History:  No date: Depression  No date: GERD (gastroesophageal reflux disease)  No date: History of radiofrequency ablation procedure for cardiac   arrhythmia  No date: Hypertension  No date: Memory loss  No date: Osteoporosis  No date: Palpitations     Sleeps sitting up in a recliner, notes 4am she awakened with R back spasm, notes that her back hurts when she moves/breaths/touches it etc. Completely reproducible. She denies dysuria/hematuria.        Review of patient's allergies indicates:  No Known Allergies  Past Medical History:   Diagnosis Date    Depression     GERD (gastroesophageal reflux disease)     History of radiofrequency ablation procedure for cardiac arrhythmia     Hypertension     Memory loss     Osteoporosis     Palpitations      Past Surgical History:   Procedure Laterality Date    ANKLE SURGERY Left     APPENDECTOMY       SECTION      x2    EYE SURGERY Left     HIP SURGERY      HIP SURGERY Left     HYSTERECTOMY      ROTATOR CUFF REPAIR      L arm    TONSILLECTOMY       No family history on file.  Social History     Tobacco Use    Smoking status: Former Smoker     Packs/day: 0.50     Types: Cigarettes     Last attempt to quit: 2019     Years since quittin.7    Smokeless tobacco: Never Used   Substance Use Topics    Alcohol use: No    Drug use: No     Review of Systems   Constitutional: Negative for fever.   HENT: Negative for sore throat.    Respiratory: Negative for shortness of breath.    Cardiovascular: Negative for chest pain.   Gastrointestinal: Negative for nausea.   Genitourinary: Negative for dysuria and hematuria.   Musculoskeletal: Negative for back pain.   Skin: Negative for rash.   Neurological:  Negative for weakness.   Hematological: Does not bruise/bleed easily.   All other systems reviewed and are negative.      Physical Exam     Initial Vitals [12/26/19 1244]   BP Pulse Resp Temp SpO2   (!) 184/91 92 20 99.8 °F (37.7 °C) (!) 94 %      MAP       --         Physical Exam    Nursing note and vitals reviewed.  Constitutional: She appears well-developed and well-nourished.   HENT:   Head: Normocephalic and atraumatic.   Eyes: Conjunctivae and EOM are normal. Pupils are equal, round, and reactive to light.   Neck: Normal range of motion.   Cardiovascular: Normal rate.   Pulmonary/Chest: No respiratory distress.   Abdominal: She exhibits no distension.   Musculoskeletal: Normal range of motion.   Neurological: She is alert. No cranial nerve deficit. GCS score is 15. GCS eye subscore is 4. GCS verbal subscore is 5. GCS motor subscore is 6.   Skin: Skin is warm and dry.   Psychiatric: She has a normal mood and affect. Thought content normal.     R paraspinal spasm, palpation completely reproduces symptoms    ED Course   Procedures  Labs Reviewed - No data to display       Imaging Results    None                                  will give pt a decadron/toradol shot here and start her on baclofen for back spasm.     Will write for mobic/pepcid/baclofen.      Clinical Impression:       ICD-10-CM ICD-9-CM   1. Back spasm M62.830 724.8                             Bogdan Ball MD  12/26/19 8446

## 2019-12-26 NOTE — DISCHARGE INSTRUCTIONS
Thank you for coming to our Emergency Department today. It is important to remember that some problems are difficult to diagnose and may not be found during your first visit. Be sure to follow up with your primary care doctor and review any labs/imaging that was performed with them. If you do not have a primary care doctor, you may contact the one listed on your discharge paperwork or you may also call the [facility] Appointment Desk at [phone] to schedule an appointment with one.     All medications may potentially have side effects and it is impossible to predict which medications may give you side effects. If you feel that you are having a negative effect of any medication you should immediately stop taking them and seek medical attention.    Return to the ER with any questions/concerns, new/concerning symptoms, worsening or failure to improve. Do not drive or make any important decisions for 24 hours if you have received any pain medications, sedatives or mood altering drugs during your ER visit.

## 2019-12-27 ENCOUNTER — PES CALL (OUTPATIENT)
Dept: ADMINISTRATIVE | Facility: CLINIC | Age: 79
End: 2019-12-27

## 2020-01-12 DIAGNOSIS — R91.1 LUNG NODULE: ICD-10-CM

## 2020-03-02 ENCOUNTER — HOSPITAL ENCOUNTER (OUTPATIENT)
Dept: RADIOLOGY | Facility: HOSPITAL | Age: 80
Discharge: HOME | End: 2020-03-02
Attending: INTERNAL MEDICINE
Payer: MEDICARE

## 2020-03-02 DIAGNOSIS — R91.1 LUNG NODULE: ICD-10-CM

## 2020-03-02 PROCEDURE — 71250 CT THORAX DX C-: CPT | Mod: TC

## 2020-03-02 PROCEDURE — 71250 CT THORAX DX C-: CPT | Mod: 26,,, | Performed by: RADIOLOGY

## 2020-03-02 PROCEDURE — 71250 CT CHEST WITHOUT CONTRAST: ICD-10-PCS | Mod: 26,,, | Performed by: RADIOLOGY

## 2020-03-30 ENCOUNTER — CLINICAL SUPPORT (OUTPATIENT)
Dept: SMOKING CESSATION | Facility: CLINIC | Age: 80
End: 2020-03-30
Payer: COMMERCIAL

## 2020-03-30 DIAGNOSIS — F17.200 NICOTINE DEPENDENCE: Primary | ICD-10-CM

## 2020-03-30 PROCEDURE — 99407 PR TOBACCO USE CESSATION INTENSIVE >10 MINUTES: ICD-10-PCS | Mod: S$GLB,,,

## 2020-03-30 PROCEDURE — 99407 BEHAV CHNG SMOKING > 10 MIN: CPT | Mod: S$GLB,,,

## 2020-03-30 NOTE — PROGRESS NOTES
Spoke with patient today in regard to smoking cessation progress for 12-month telephone follow up on quit 1 and 3,6 on Quit 2. Patient states that she is tobacco free. Commended patient on quit. Informed patient of benefit period, future follow up, and contact information if any further help or support is needed. Will complete/resolve smart form for 12 month follow up on Quit attempt #1 and 3,6 on quit 2..

## 2020-06-11 ENCOUNTER — TELEPHONE (OUTPATIENT)
Dept: PULMONOLOGY | Facility: CLINIC | Age: 80
End: 2020-06-11

## 2020-06-11 NOTE — TELEPHONE ENCOUNTER
Patient called the clinic requesting her results from her CT scan be faxed over to her pain clinic. Instructed patient to have her pain clinic fax over a release of information to [phone] or [phone]. Patient wrote down the number and had no additional questions or concerns at this time.

## 2020-10-13 ENCOUNTER — TELEPHONE (OUTPATIENT)
Dept: CARDIOLOGY | Facility: CLINIC | Age: 80
End: 2020-10-13

## 2020-10-13 NOTE — TELEPHONE ENCOUNTER
----- Message from Philly Acevedo MA sent at 10/13/2020 11:20 AM CDT -----    ----- Message -----  From: Desiree Maier  Sent: 10/13/2020  10:41 AM CDT  To: [facility] Staff    Who Called: Kathya (Martha HUERTAS)    What is the reqeust in detail: Would like Dr. Lima to give Dr. Parikh a call whenever he gets a chance in regards to the pt. Please advise.     Can the clinic reply by [facility]?: No    Best Call Back Number: cell: [phone]

## 2020-10-16 ENCOUNTER — OFFICE VISIT (OUTPATIENT)
Dept: CARDIOLOGY | Facility: CLINIC | Age: 80
End: 2020-10-16
Payer: MEDICARE

## 2020-10-16 VITALS
DIASTOLIC BLOOD PRESSURE: 53 MMHG | OXYGEN SATURATION: 94 % | SYSTOLIC BLOOD PRESSURE: 78 MMHG | WEIGHT: 154.31 LBS | BODY MASS INDEX: 24.8 KG/M2 | HEIGHT: 66 IN | HEART RATE: 87 BPM

## 2020-10-16 DIAGNOSIS — E78.5 HYPERLIPIDEMIA, UNSPECIFIED HYPERLIPIDEMIA TYPE: ICD-10-CM

## 2020-10-16 DIAGNOSIS — R07.89 CHEST PAIN, ATYPICAL: ICD-10-CM

## 2020-10-16 DIAGNOSIS — I10 ESSENTIAL HYPERTENSION: ICD-10-CM

## 2020-10-16 DIAGNOSIS — F02.80 EARLY ONSET ALZHEIMER'S DEMENTIA WITHOUT BEHAVIORAL DISTURBANCE: Primary | ICD-10-CM

## 2020-10-16 DIAGNOSIS — R07.9 CHEST PAIN: ICD-10-CM

## 2020-10-16 DIAGNOSIS — G30.0 EARLY ONSET ALZHEIMER'S DEMENTIA WITHOUT BEHAVIORAL DISTURBANCE: Primary | ICD-10-CM

## 2020-10-16 PROCEDURE — 3078F PR MOST RECENT DIASTOLIC BLOOD PRESSURE < 80 MM HG: ICD-10-PCS | Mod: CPTII,S$GLB,, | Performed by: INTERNAL MEDICINE

## 2020-10-16 PROCEDURE — 3074F SYST BP LT 130 MM HG: CPT | Mod: CPTII,S$GLB,, | Performed by: INTERNAL MEDICINE

## 2020-10-16 PROCEDURE — 99204 PR OFFICE/OUTPT VISIT, NEW, LEVL IV, 45-59 MIN: ICD-10-PCS | Mod: S$GLB,,, | Performed by: INTERNAL MEDICINE

## 2020-10-16 PROCEDURE — 3078F DIAST BP <80 MM HG: CPT | Mod: CPTII,S$GLB,, | Performed by: INTERNAL MEDICINE

## 2020-10-16 PROCEDURE — 1101F PT FALLS ASSESS-DOCD LE1/YR: CPT | Mod: CPTII,S$GLB,, | Performed by: INTERNAL MEDICINE

## 2020-10-16 PROCEDURE — 93000 ELECTROCARDIOGRAM COMPLETE: CPT | Mod: S$GLB,,, | Performed by: INTERNAL MEDICINE

## 2020-10-16 PROCEDURE — 1101F PR PT FALLS ASSESS DOC 0-1 FALLS W/OUT INJ PAST YR: ICD-10-PCS | Mod: CPTII,S$GLB,, | Performed by: INTERNAL MEDICINE

## 2020-10-16 PROCEDURE — 1125F AMNT PAIN NOTED PAIN PRSNT: CPT | Mod: S$GLB,,, | Performed by: INTERNAL MEDICINE

## 2020-10-16 PROCEDURE — 93000 EKG 12-LEAD: ICD-10-PCS | Mod: S$GLB,,, | Performed by: INTERNAL MEDICINE

## 2020-10-16 PROCEDURE — 3074F PR MOST RECENT SYSTOLIC BLOOD PRESSURE < 130 MM HG: ICD-10-PCS | Mod: CPTII,S$GLB,, | Performed by: INTERNAL MEDICINE

## 2020-10-16 PROCEDURE — 1125F PR PAIN SEVERITY QUANTIFIED, PAIN PRESENT: ICD-10-PCS | Mod: S$GLB,,, | Performed by: INTERNAL MEDICINE

## 2020-10-16 PROCEDURE — 1159F MED LIST DOCD IN RCRD: CPT | Mod: S$GLB,,, | Performed by: INTERNAL MEDICINE

## 2020-10-16 PROCEDURE — 99999 PR PBB SHADOW E&M-EST. PATIENT-LVL V: CPT | Mod: PBBFAC,,, | Performed by: INTERNAL MEDICINE

## 2020-10-16 PROCEDURE — 99204 OFFICE O/P NEW MOD 45 MIN: CPT | Mod: S$GLB,,, | Performed by: INTERNAL MEDICINE

## 2020-10-16 PROCEDURE — 99999 PR PBB SHADOW E&M-EST. PATIENT-LVL V: ICD-10-PCS | Mod: PBBFAC,,, | Performed by: INTERNAL MEDICINE

## 2020-10-16 PROCEDURE — 1159F PR MEDICATION LIST DOCUMENTED IN MEDICAL RECORD: ICD-10-PCS | Mod: S$GLB,,, | Performed by: INTERNAL MEDICINE

## 2020-10-16 NOTE — PROGRESS NOTES
"Subjective:    Patient ID:  Annette Fallon is a 80 y.o. female who presents for evaluation of Chest Pain      HPI     Followed at     HTN, HLD, Dementia, blind, Ablation ? SVT -Dr Davenport > 25 years ago    Went to the ER  10/8/20    81yo F hx acid reflux and blindness p/w sore throat. Otherwise in USOH. Just had colonoscopy, when she woke w/ sore throat, GI physician voiced concern she may have aspirated causing sore throat. She has no CP, SOB, fever or other acute symptoms. She says "if my test is normal I want to go home"    Stress test 9/30/15  LVEF: >= 70 %   Impression: NORMAL MYOCARDIAL PERFUSION   1. The perfusion scan is free of evidence for myocardial ischemia or injury.   2. Resting wall motion is physiologic.   3. Resting LV function is normal.   4. The ventricular volumes are normal at rest and stress.   5. The extracardiac distribution of radioactivity is normal.     10/16/20 Recently Dx with colon CA by colonoscopy. Needs cardiac clearance prior to surgery. Reports LOUIS but denies prior CAD. Reports remote Hx of ablation ? SVT by Dr Davenport > 25 years ago. Some GERD like CP  EKG NSR IVCD    Review of Systems   Unable to perform ROS: dementia        Objective:    Physical Exam   Constitutional: She is oriented to person, place, and time. She appears well-developed and well-nourished.   HENT:   Head: Normocephalic and atraumatic.   Eyes: Pupils are equal, round, and reactive to light. Conjunctivae are normal.   Neck: Normal range of motion. Neck supple.   Cardiovascular: Normal rate, normal heart sounds and intact distal pulses.   Pulmonary/Chest: Effort normal and breath sounds normal.   Abdominal: Soft. Bowel sounds are normal.   Musculoskeletal: Normal range of motion.   Neurological: She is alert and oriented to person, place, and time.   Skin: Skin is warm and dry.         Assessment:       1. Early onset Alzheimer's dementia without behavioral disturbance    2. Chest pain, atypical    3. " Essential hypertension    4. Hyperlipidemia, unspecified hyperlipidemia type         Plan:       Echo and lexiscan myoview next week - if ok will clear for colon surgery

## 2020-10-20 ENCOUNTER — HOSPITAL ENCOUNTER (OUTPATIENT)
Dept: CARDIOLOGY | Facility: HOSPITAL | Age: 80
Discharge: HOME | End: 2020-10-20
Attending: INTERNAL MEDICINE
Payer: MEDICARE

## 2020-10-20 ENCOUNTER — HOSPITAL ENCOUNTER (OUTPATIENT)
Dept: RADIOLOGY | Facility: HOSPITAL | Age: 80
Discharge: HOME | End: 2020-10-20
Attending: INTERNAL MEDICINE
Payer: MEDICARE

## 2020-10-20 DIAGNOSIS — G30.0 EARLY ONSET ALZHEIMER'S DEMENTIA WITHOUT BEHAVIORAL DISTURBANCE: ICD-10-CM

## 2020-10-20 DIAGNOSIS — F02.80 EARLY ONSET ALZHEIMER'S DEMENTIA WITHOUT BEHAVIORAL DISTURBANCE: ICD-10-CM

## 2020-10-20 DIAGNOSIS — I10 ESSENTIAL HYPERTENSION: ICD-10-CM

## 2020-10-20 DIAGNOSIS — R07.89 CHEST PAIN, ATYPICAL: ICD-10-CM

## 2020-10-20 DIAGNOSIS — E78.5 HYPERLIPIDEMIA, UNSPECIFIED HYPERLIPIDEMIA TYPE: ICD-10-CM

## 2020-10-20 LAB
ASCENDING AORTA: 3.25 CM
AV INDEX (PROSTH): 0.83
AV MEAN GRADIENT: 4 MMHG
AV PEAK GRADIENT: 7 MMHG
AV VALVE AREA: 3.77 CM2
AV VELOCITY RATIO: 0.81
CV ECHO LV RWT: 0.6 CM
CV STRESS BASE HR: 78 BPM
DIASTOLIC BLOOD PRESSURE: 78 MMHG
DOP CALC AO PEAK VEL: 1.31 M/S
DOP CALC AO VTI: 20.28 CM
DOP CALC LVOT AREA: 4.5 CM2
DOP CALC LVOT DIAMETER: 2.4 CM
DOP CALC LVOT PEAK VEL: 1.06 M/S
DOP CALC LVOT STROKE VOLUME: 76.37 CM3
DOP CALCLVOT PEAK VEL VTI: 16.89 CM
E WAVE DECELERATION TIME: 208.33 MSEC
E/A RATIO: 1.11
E/E' RATIO: 19.43 M/S
ECHO LV POSTERIOR WALL: 1.04 CM (ref 0.6–1.1)
FRACTIONAL SHORTENING: 48 % (ref 28–44)
INTERVENTRICULAR SEPTUM: 1 CM (ref 0.6–1.1)
IVRT: 83.04 MSEC
LA MAJOR: 4.39 CM
LA MINOR: 4.47 CM
LA WIDTH: 3.46 CM
LEFT ATRIUM SIZE: 2.95 CM
LEFT ATRIUM VOLUME: 38.43 CM3
LEFT INTERNAL DIMENSION IN SYSTOLE: 1.79 CM (ref 2.1–4)
LEFT VENTRICLE DIASTOLIC VOLUME: 49.84 ML
LEFT VENTRICLE SYSTOLIC VOLUME: 9.61 ML
LEFT VENTRICULAR INTERNAL DIMENSION IN DIASTOLE: 3.47 CM (ref 3.5–6)
LEFT VENTRICULAR MASS: 105.02 G
LV LATERAL E/E' RATIO: 15.11 M/S
LV SEPTAL E/E' RATIO: 27.2 M/S
MV PEAK A VEL: 1.22 M/S
MV PEAK E VEL: 1.36 M/S
MV STENOSIS PRESSURE HALF TIME: 33.49 MS
MV VALVE AREA P 1/2 METHOD: 6.57 CM2
NUC REST EJECTION FRACTION: 87
OHS CV CPX 85 PERCENT MAX PREDICTED HEART RATE MALE: 115
OHS CV CPX MAX PREDICTED HEART RATE: 135
OHS CV CPX PATIENT IS FEMALE: 1
OHS CV CPX PATIENT IS MALE: 0
OHS CV CPX PEAK DIASTOLIC BLOOD PRESSURE: 59 MMHG
OHS CV CPX PEAK HEAR RATE: 98 BPM
OHS CV CPX PEAK RATE PRESSURE PRODUCT: NORMAL
OHS CV CPX PEAK SYSTOLIC BLOOD PRESSURE: 140 MMHG
OHS CV CPX PERCENT MAX PREDICTED HEART RATE ACHIEVED: 73
OHS CV CPX RATE PRESSURE PRODUCT PRESENTING: NORMAL
PISA TR MAX VEL: 3.62 M/S
PV PEAK VELOCITY: 1.28 CM/S
RA MAJOR: 4.1 CM
RA PRESSURE: 3 MMHG
RA WIDTH: 3.07 CM
RIGHT VENTRICULAR END-DIASTOLIC DIMENSION: 3.83 CM
RV TISSUE DOPPLER FREE WALL SYSTOLIC VELOCITY 1 (APICAL 4 CHAMBER VIEW): 10.6 CM/S
SINUS: 3.08 CM
STJ: 2.27 CM
SYSTOLIC BLOOD PRESSURE: 130 MMHG
TDI LATERAL: 0.09 M/S
TDI SEPTAL: 0.05 M/S
TDI: 0.07 M/S
TR MAX PG: 52 MMHG
TRICUSPID ANNULAR PLANE SYSTOLIC EXCURSION: 1.72 CM
TV REST PULMONARY ARTERY PRESSURE: 55 MMHG

## 2020-10-20 PROCEDURE — 63600175 PHARM REV CODE 636 W HCPCS: Performed by: INTERNAL MEDICINE

## 2020-10-20 PROCEDURE — A9502 TC99M TETROFOSMIN: HCPCS

## 2020-10-20 PROCEDURE — 93016 CV STRESS TEST SUPVJ ONLY: CPT | Mod: ,,, | Performed by: INTERNAL MEDICINE

## 2020-10-20 PROCEDURE — 93018 CV STRESS TEST I&R ONLY: CPT | Mod: ,,, | Performed by: INTERNAL MEDICINE

## 2020-10-20 PROCEDURE — 78452 STRESS TEST WITH MYOCARDIAL PERFUSION (CUPID ONLY): ICD-10-PCS | Mod: 26,,, | Performed by: INTERNAL MEDICINE

## 2020-10-20 PROCEDURE — 93017 CV STRESS TEST TRACING ONLY: CPT

## 2020-10-20 PROCEDURE — 93306 TTE W/DOPPLER COMPLETE: CPT | Mod: 26,,, | Performed by: INTERNAL MEDICINE

## 2020-10-20 PROCEDURE — 78452 HT MUSCLE IMAGE SPECT MULT: CPT | Mod: 26,,, | Performed by: INTERNAL MEDICINE

## 2020-10-20 PROCEDURE — 93016 STRESS TEST WITH MYOCARDIAL PERFUSION (CUPID ONLY): ICD-10-PCS | Mod: ,,, | Performed by: INTERNAL MEDICINE

## 2020-10-20 PROCEDURE — 93306 TTE W/DOPPLER COMPLETE: CPT

## 2020-10-20 PROCEDURE — 93306 ECHO (CUPID ONLY): ICD-10-PCS | Mod: 26,,, | Performed by: INTERNAL MEDICINE

## 2020-10-20 PROCEDURE — 93018 STRESS TEST WITH MYOCARDIAL PERFUSION (CUPID ONLY): ICD-10-PCS | Mod: ,,, | Performed by: INTERNAL MEDICINE

## 2020-10-20 RX ORDER — REGADENOSON 0.08 MG/ML
0.4 INJECTION, SOLUTION INTRAVENOUS ONCE
Status: COMPLETED | OUTPATIENT
Start: 2020-10-20 | End: 2020-10-20

## 2020-10-20 RX ADMIN — REGADENOSON 0.4 MG: 0.08 INJECTION, SOLUTION INTRAVENOUS at 10:10

## 2020-10-26 ENCOUNTER — OFFICE VISIT (OUTPATIENT)
Dept: CARDIOLOGY | Facility: CLINIC | Age: 80
End: 2020-10-26
Payer: MEDICARE

## 2020-10-26 VITALS
OXYGEN SATURATION: 97 % | SYSTOLIC BLOOD PRESSURE: 149 MMHG | HEART RATE: 104 BPM | WEIGHT: 154.31 LBS | BODY MASS INDEX: 24.8 KG/M2 | DIASTOLIC BLOOD PRESSURE: 83 MMHG | HEIGHT: 66 IN

## 2020-10-26 DIAGNOSIS — E78.5 HYPERLIPIDEMIA, UNSPECIFIED HYPERLIPIDEMIA TYPE: ICD-10-CM

## 2020-10-26 DIAGNOSIS — I10 ESSENTIAL HYPERTENSION: ICD-10-CM

## 2020-10-26 DIAGNOSIS — R07.89 CHEST PAIN, ATYPICAL: Primary | ICD-10-CM

## 2020-10-26 DIAGNOSIS — G30.0 EARLY ONSET ALZHEIMER'S DEMENTIA WITHOUT BEHAVIORAL DISTURBANCE: ICD-10-CM

## 2020-10-26 DIAGNOSIS — F02.80 EARLY ONSET ALZHEIMER'S DEMENTIA WITHOUT BEHAVIORAL DISTURBANCE: ICD-10-CM

## 2020-10-26 PROCEDURE — 1101F PR PT FALLS ASSESS DOC 0-1 FALLS W/OUT INJ PAST YR: ICD-10-PCS | Mod: CPTII,S$GLB,, | Performed by: INTERNAL MEDICINE

## 2020-10-26 PROCEDURE — 3077F SYST BP >= 140 MM HG: CPT | Mod: CPTII,S$GLB,, | Performed by: INTERNAL MEDICINE

## 2020-10-26 PROCEDURE — 1126F PR PAIN SEVERITY QUANTIFIED, NO PAIN PRESENT: ICD-10-PCS | Mod: S$GLB,,, | Performed by: INTERNAL MEDICINE

## 2020-10-26 PROCEDURE — 3079F DIAST BP 80-89 MM HG: CPT | Mod: CPTII,S$GLB,, | Performed by: INTERNAL MEDICINE

## 2020-10-26 PROCEDURE — 1159F MED LIST DOCD IN RCRD: CPT | Mod: S$GLB,,, | Performed by: INTERNAL MEDICINE

## 2020-10-26 PROCEDURE — 99213 PR OFFICE/OUTPT VISIT, EST, LEVL III, 20-29 MIN: ICD-10-PCS | Mod: S$GLB,,, | Performed by: INTERNAL MEDICINE

## 2020-10-26 PROCEDURE — 1101F PT FALLS ASSESS-DOCD LE1/YR: CPT | Mod: CPTII,S$GLB,, | Performed by: INTERNAL MEDICINE

## 2020-10-26 PROCEDURE — 1159F PR MEDICATION LIST DOCUMENTED IN MEDICAL RECORD: ICD-10-PCS | Mod: S$GLB,,, | Performed by: INTERNAL MEDICINE

## 2020-10-26 PROCEDURE — 1126F AMNT PAIN NOTED NONE PRSNT: CPT | Mod: S$GLB,,, | Performed by: INTERNAL MEDICINE

## 2020-10-26 PROCEDURE — 3079F PR MOST RECENT DIASTOLIC BLOOD PRESSURE 80-89 MM HG: ICD-10-PCS | Mod: CPTII,S$GLB,, | Performed by: INTERNAL MEDICINE

## 2020-10-26 PROCEDURE — 99999 PR PBB SHADOW E&M-EST. PATIENT-LVL V: ICD-10-PCS | Mod: PBBFAC,,, | Performed by: INTERNAL MEDICINE

## 2020-10-26 PROCEDURE — 3077F PR MOST RECENT SYSTOLIC BLOOD PRESSURE >= 140 MM HG: ICD-10-PCS | Mod: CPTII,S$GLB,, | Performed by: INTERNAL MEDICINE

## 2020-10-26 PROCEDURE — 99999 PR PBB SHADOW E&M-EST. PATIENT-LVL V: CPT | Mod: PBBFAC,,, | Performed by: INTERNAL MEDICINE

## 2020-10-26 PROCEDURE — 99213 OFFICE O/P EST LOW 20 MIN: CPT | Mod: S$GLB,,, | Performed by: INTERNAL MEDICINE

## 2020-10-26 NOTE — PROGRESS NOTES
"Subjective:    Patient ID:  Annette Fallon is a 80 y.o. female who presents for follow-up of Results      HPI     Followed at      HTN, HLD, Dementia, blind, Ablation ? SVT -Dr Davenport > 25 years ago     Went to the ER  10/8/20    81yo F hx acid reflux and blindness p/w sore throat. Otherwise in USOH. Just had colonoscopy, when she woke w/ sore throat, GI physician voiced concern she may have aspirated causing sore throat. She has no CP, SOB, fever or other acute symptoms. She says "if my test is normal I want to go home"     Echo 10/20/20  · The left ventricle is normal in size with normal systolic function. The estimated ejection fraction is 55%.  · Grade II diastolic dysfunction.  · Normal right ventricular systolic function.  · Mild left atrial enlargement.  · Moderate tricuspid regurgitation.  · Normal central venous pressure (3 mmHg).  · The estimated PA systolic pressure is 55 mmHg.  · There is pulmonary hypertension.    Stress test 10/20/20    The study shows normal myocardial perfusion.    The perfusion scan is free of evidence from myocardial ischemia or injury.    There is a  mild to moderate intensity fixed defect in the inferior wall of the left ventricle secondary to diaphragm attenuation.    Gated perfusion images showed an ejection fraction of 87%    There is normal wall motion at rest and post stress.    The EKG portion of this study is negative for ischemia.    The patient reported no chest pain during the stress test.    There were no arrhythmias during stress.     10/16/20 Recently Dx with colon CA by colonoscopy. Needs cardiac clearance prior to surgery. Reports LOUIS but denies prior CAD. Reports remote Hx of ablation ? SVT by Dr Davenport > 25 years ago. Some GERD like CP  EKG NSR IVCD   Echo and lexiscan myoview next week - if ok will clear for colon surgery      10/26/20 Denies CP or SOB    Review of Systems   Constitution: Negative for decreased appetite.   HENT: Negative for " ear discharge.    Eyes: Negative for blurred vision.   Respiratory: Negative for hemoptysis.    Endocrine: Negative for polyphagia.   Hematologic/Lymphatic: Negative for adenopathy.   Skin: Negative for color change.   Musculoskeletal: Negative for joint swelling.   Genitourinary: Negative for bladder incontinence.   Neurological: Negative for brief paralysis.   Psychiatric/Behavioral: Negative for hallucinations.   Allergic/Immunologic: Negative for hives.        Objective:    Physical Exam   Constitutional: She is oriented to person, place, and time. She appears well-developed and well-nourished.   HENT:   Head: Normocephalic and atraumatic.   Eyes: Pupils are equal, round, and reactive to light. Conjunctivae are normal.   Neck: Normal range of motion. Neck supple.   Cardiovascular: Normal rate, normal heart sounds and intact distal pulses.   Pulmonary/Chest: Effort normal and breath sounds normal.   Abdominal: Soft. Bowel sounds are normal.   Musculoskeletal: Normal range of motion.   Neurological: She is alert and oriented to person, place, and time.   Skin: Skin is warm and dry.         Assessment:       1. Chest pain, atypical    2. Essential hypertension    3. Hyperlipidemia, unspecified hyperlipidemia type    4. Early onset Alzheimer's dementia without behavioral disturbance         Plan:       Cleared for colon surgery at low cardiac risk  OV here 6 months

## 2020-10-31 ENCOUNTER — HOSPITAL ENCOUNTER (EMERGENCY)
Facility: HOSPITAL | Age: 80
Discharge: HOME | End: 2020-10-31
Attending: EMERGENCY MEDICINE
Payer: MEDICARE

## 2020-10-31 VITALS
HEART RATE: 84 BPM | DIASTOLIC BLOOD PRESSURE: 59 MMHG | TEMPERATURE: 98 F | WEIGHT: 155 LBS | HEIGHT: 66 IN | BODY MASS INDEX: 24.91 KG/M2 | RESPIRATION RATE: 33 BRPM | OXYGEN SATURATION: 92 % | SYSTOLIC BLOOD PRESSURE: 114 MMHG

## 2020-10-31 DIAGNOSIS — N30.01 ACUTE CYSTITIS WITH HEMATURIA: Primary | ICD-10-CM

## 2020-10-31 DIAGNOSIS — R53.1 WEAKNESS: ICD-10-CM

## 2020-10-31 DIAGNOSIS — R63.0 ANOREXIA: ICD-10-CM

## 2020-10-31 DIAGNOSIS — R53.83 FATIGUE, UNSPECIFIED TYPE: ICD-10-CM

## 2020-10-31 LAB
ALBUMIN SERPL BCP-MCNC: 3.1 G/DL (ref 3.5–5.2)
ALP SERPL-CCNC: 89 U/L (ref 55–135)
ALT SERPL W/O P-5'-P-CCNC: 14 U/L (ref 10–44)
ANION GAP SERPL CALC-SCNC: 11 MMOL/L (ref 8–16)
AST SERPL-CCNC: 13 U/L (ref 10–40)
BACTERIA #/AREA URNS HPF: ABNORMAL /HPF
BASOPHILS # BLD AUTO: 0.01 K/UL (ref 0–0.2)
BASOPHILS NFR BLD: 0.1 % (ref 0–1.9)
BILIRUB SERPL-MCNC: 0.3 MG/DL (ref 0.1–1)
BILIRUB UR QL STRIP: NEGATIVE
BUN SERPL-MCNC: 63 MG/DL (ref 8–23)
CALCIUM SERPL-MCNC: 8.6 MG/DL (ref 8.7–10.5)
CHLORIDE SERPL-SCNC: 112 MMOL/L (ref 95–110)
CLARITY UR: ABNORMAL
CO2 SERPL-SCNC: 15 MMOL/L (ref 23–29)
COLOR UR: YELLOW
CREAT SERPL-MCNC: 1.2 MG/DL (ref 0.5–1.4)
DIFFERENTIAL METHOD: ABNORMAL
EOSINOPHIL # BLD AUTO: 0 K/UL (ref 0–0.5)
EOSINOPHIL NFR BLD: 0.3 % (ref 0–8)
ERYTHROCYTE [DISTWIDTH] IN BLOOD BY AUTOMATED COUNT: 15 % (ref 11.5–14.5)
EST. GFR  (AFRICAN AMERICAN): 49 ML/MIN/1.73 M^2
EST. GFR  (NON AFRICAN AMERICAN): 42 ML/MIN/1.73 M^2
GLUCOSE SERPL-MCNC: 125 MG/DL (ref 70–110)
GLUCOSE UR QL STRIP: NEGATIVE
HCT VFR BLD AUTO: 35.2 % (ref 37–48.5)
HGB BLD-MCNC: 11.1 G/DL (ref 12–16)
HGB UR QL STRIP: ABNORMAL
HYALINE CASTS #/AREA URNS LPF: 0 /LPF
IMM GRANULOCYTES # BLD AUTO: 0.05 K/UL (ref 0–0.04)
IMM GRANULOCYTES NFR BLD AUTO: 0.6 % (ref 0–0.5)
KETONES UR QL STRIP: NEGATIVE
LACTATE SERPL-SCNC: 0.9 MMOL/L (ref 0.5–2.2)
LEUKOCYTE ESTERASE UR QL STRIP: ABNORMAL
LYMPHOCYTES # BLD AUTO: 0.6 K/UL (ref 1–4.8)
LYMPHOCYTES NFR BLD: 7.2 % (ref 18–48)
MAGNESIUM SERPL-MCNC: 2.1 MG/DL (ref 1.6–2.6)
MCH RBC QN AUTO: 26.9 PG (ref 27–31)
MCHC RBC AUTO-ENTMCNC: 31.5 G/DL (ref 32–36)
MCV RBC AUTO: 85 FL (ref 82–98)
MICROSCOPIC COMMENT: ABNORMAL
MONOCYTES # BLD AUTO: 0.5 K/UL (ref 0.3–1)
MONOCYTES NFR BLD: 6.8 % (ref 4–15)
NEUTROPHILS # BLD AUTO: 6.7 K/UL (ref 1.8–7.7)
NEUTROPHILS NFR BLD: 85 % (ref 38–73)
NITRITE UR QL STRIP: POSITIVE
NRBC BLD-RTO: 0 /100 WBC
PH UR STRIP: 5 [PH] (ref 5–8)
PLATELET # BLD AUTO: 256 K/UL (ref 150–350)
PMV BLD AUTO: 10.2 FL (ref 9.2–12.9)
POTASSIUM SERPL-SCNC: 5.1 MMOL/L (ref 3.5–5.1)
PROT SERPL-MCNC: 6.9 G/DL (ref 6–8.4)
PROT UR QL STRIP: ABNORMAL
RBC # BLD AUTO: 4.12 M/UL (ref 4–5.4)
RBC #/AREA URNS HPF: >100 /HPF (ref 0–4)
SODIUM SERPL-SCNC: 138 MMOL/L (ref 136–145)
SP GR UR STRIP: 1.01 (ref 1–1.03)
SQUAMOUS #/AREA URNS HPF: 6 /HPF
TROPONIN I SERPL DL<=0.01 NG/ML-MCNC: <0.006 NG/ML (ref 0–0.03)
URN SPEC COLLECT METH UR: ABNORMAL
UROBILINOGEN UR STRIP-ACNC: NEGATIVE EU/DL
WBC # BLD AUTO: 7.82 K/UL (ref 3.9–12.7)
WBC #/AREA URNS HPF: >100 /HPF (ref 0–5)

## 2020-10-31 PROCEDURE — 51798 US URINE CAPACITY MEASURE: CPT

## 2020-10-31 PROCEDURE — 99285 EMERGENCY DEPT VISIT HI MDM: CPT | Mod: 25

## 2020-10-31 PROCEDURE — 63600175 PHARM REV CODE 636 W HCPCS: Performed by: EMERGENCY MEDICINE

## 2020-10-31 PROCEDURE — 87077 CULTURE AEROBIC IDENTIFY: CPT

## 2020-10-31 PROCEDURE — 87186 SC STD MICRODIL/AGAR DIL: CPT

## 2020-10-31 PROCEDURE — 87086 URINE CULTURE/COLONY COUNT: CPT

## 2020-10-31 PROCEDURE — 84484 ASSAY OF TROPONIN QUANT: CPT

## 2020-10-31 PROCEDURE — 96365 THER/PROPH/DIAG IV INF INIT: CPT

## 2020-10-31 PROCEDURE — 80053 COMPREHEN METABOLIC PANEL: CPT

## 2020-10-31 PROCEDURE — 25000003 PHARM REV CODE 250: Performed by: EMERGENCY MEDICINE

## 2020-10-31 PROCEDURE — 81000 URINALYSIS NONAUTO W/SCOPE: CPT

## 2020-10-31 PROCEDURE — 83735 ASSAY OF MAGNESIUM: CPT

## 2020-10-31 PROCEDURE — 93010 ELECTROCARDIOGRAM REPORT: CPT | Mod: ,,, | Performed by: INTERNAL MEDICINE

## 2020-10-31 PROCEDURE — 93010 EKG 12-LEAD: ICD-10-PCS | Mod: ,,, | Performed by: INTERNAL MEDICINE

## 2020-10-31 PROCEDURE — 85025 COMPLETE CBC W/AUTO DIFF WBC: CPT

## 2020-10-31 PROCEDURE — 93005 ELECTROCARDIOGRAM TRACING: CPT

## 2020-10-31 PROCEDURE — 87088 URINE BACTERIA CULTURE: CPT

## 2020-10-31 PROCEDURE — 96361 HYDRATE IV INFUSION ADD-ON: CPT

## 2020-10-31 PROCEDURE — 83605 ASSAY OF LACTIC ACID: CPT

## 2020-10-31 RX ORDER — ONDANSETRON 4 MG/1
4 TABLET, FILM COATED ORAL EVERY 8 HOURS PRN
Qty: 12 TABLET | Refills: 0 | Status: SHIPPED | OUTPATIENT
Start: 2020-10-31 | End: 2020-11-09 | Stop reason: CLARIF

## 2020-10-31 RX ORDER — CEPHALEXIN 500 MG/1
1000 CAPSULE ORAL EVERY 12 HOURS
Qty: 28 CAPSULE | Refills: 0 | Status: SHIPPED | OUTPATIENT
Start: 2020-10-31 | End: 2020-11-07

## 2020-10-31 RX ADMIN — SODIUM CHLORIDE 1000 ML: 0.9 INJECTION, SOLUTION INTRAVENOUS at 11:10

## 2020-10-31 RX ADMIN — SODIUM CHLORIDE 1000 ML: 0.9 INJECTION, SOLUTION INTRAVENOUS at 01:10

## 2020-10-31 RX ADMIN — CEFTRIAXONE 2 G: 2 INJECTION, SOLUTION INTRAVENOUS at 01:10

## 2020-10-31 NOTE — ED TRIAGE NOTES
Patient arrived via EMS secondary to increased weakness, inability to walk, spouse states she fell 3 times last night, she states she gets dizzy when ambulating, has not had appetite eating very little and recently was Diagnosed with colon CA. Spouse states she has not been taking home medications as ordered, his sister will start to take over medications for the patient.

## 2020-10-31 NOTE — ED PROVIDER NOTES
"Encounter Date: 10/31/2020    SCRIBE #1 NOTE: I, Aries Hathaway, am scribing for, and in the presence of,  Danny Marshall MD. I have scribed the following portions of the note - Other sections scribed: HPI, ROS.       History     Chief Complaint   Patient presents with    Fatigue     EMS reports pt c/o weakness in the legs today. also c/o dysuria, abdominal pain     Dysuria     Annette Fallon is a 80 y.o. female with a PMHx of total blindness, colon cancer, depression, GERD, HTN, and osteoporosis who presents to the ED with complaints of generalized weakness and decreased appetite for approximately 2-3 weeks. Patient reports she has not eaten in approximately three weeks.  States she only eats one bite of a sandwich and then is not hungry. Also states that she tries to walk and her legs cannot hold her up. Other symptoms include mild RLQ pain for two months, chronic shortness of breath, one episode of vomiting yesterday, and mild urinary incontinence. Vomit described as clear liquid. Urinary incontinence described as "a little bit comes out when I don't want it to come out." Denies any fever, chills, headache, eye problems, ear problems, sore throat, chest pain, dyspnea, nausea, diarrhea, other urinary problems, rash, or extremity problems.  Patient has a colonoscopy scheduled for tomorrow. PSHx includes appendectomy, , hysterectomy, tonsillectomy, and hip surgery.     The history is provided by the patient. No  was used.     Review of patient's allergies indicates:  No Known Allergies  Past Medical History:   Diagnosis Date    Depression     GERD (gastroesophageal reflux disease)     History of radiofrequency ablation procedure for cardiac arrhythmia     Hypertension     Memory loss     Osteoporosis     Palpitations      Past Surgical History:   Procedure Laterality Date    ANKLE SURGERY Left     APPENDECTOMY       SECTION      x2    EYE SURGERY Left     HIP " SURGERY      HIP SURGERY Left     HYSTERECTOMY      ROTATOR CUFF REPAIR      L arm    TONSILLECTOMY       History reviewed. No pertinent family history.  Social History     Tobacco Use    Smoking status: Former Smoker     Packs/day: 0.50     Types: Cigarettes     Quit date: 2019     Years since quittin.5    Smokeless tobacco: Never Used   Substance Use Topics    Alcohol use: No    Drug use: No     Review of Systems   Constitutional: Positive for appetite change (Decreased). Negative for chills, diaphoresis and fever.   HENT: Negative for ear pain and sore throat.    Eyes: Negative for pain.   Respiratory: Positive for shortness of breath (Chronic). Negative for cough.         Negative for dyspnea.    Cardiovascular: Negative for chest pain.   Gastrointestinal: Positive for abdominal pain (RLQ) and vomiting. Negative for diarrhea.   Genitourinary: Negative for difficulty urinating, dysuria and hematuria.        Positive for mild urinary incontinence.    Musculoskeletal:        Negative for arm or leg problems.    Skin: Negative for rash.   Neurological: Positive for weakness. Negative for headaches.       Physical Exam     Initial Vitals [10/31/20 1007]   BP Pulse Resp Temp SpO2   106/62 88 20 98.5 °F (36.9 °C) 97 %      MAP       --         Physical Exam  The patient was examined specifically for the following:   General:No significant distress, Good color, Warm and dry. Head and neck:Scalp atraumatic, Neck supple. Neurological:Appropriate conversation, Gross motor deficits. Eyes:Conjugate gaze, Clear corneas. ENT: No epistaxis. Cardiac: Regular rate and rhythm, Grossly normal heart tones. Pulmonary: Wheezing, Rales. Gastrointestinal: Abdominal tenderness, Abdominal distention. Musculoskeletal: Extremity deformity, Apparent pain with range of motion of the joints. Skin: Rash.   The findings on examination were normal except for the following:  The patient is blind.  She has an opacified cornea on the  right.  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  The lungs are clear.  The heart tones are normal.  The abdomen is soft.  Vital signs are stable.  There is minimal tenderness in the right lower quadrant.  There is no guarding rebound mass or distention.  Mental status examination, cranial nerves, motor and sensory examination are grossly unremarkable, with examination in bed.  ED Course   Procedures  Labs Reviewed   URINALYSIS, REFLEX TO URINE CULTURE - Abnormal; Notable for the following components:       Result Value    Appearance, UA Cloudy (*)     Protein, UA 2+ (*)     Occult Blood UA 1+ (*)     Nitrite, UA Positive (*)     Leukocytes, UA 3+ (*)     All other components within normal limits    Narrative:     Specimen Source->Urine   COMPREHENSIVE METABOLIC PANEL - Abnormal; Notable for the following components:    Chloride 112 (*)     CO2 15 (*)     Glucose 125 (*)     BUN 63 (*)     Calcium 8.6 (*)     Albumin 3.1 (*)     eGFR if  49 (*)     eGFR if non  42 (*)     All other components within normal limits   CBC W/ AUTO DIFFERENTIAL - Abnormal; Notable for the following components:    Hemoglobin 11.1 (*)     Hematocrit 35.2 (*)     MCH 26.9 (*)     MCHC 31.5 (*)     RDW 15.0 (*)     Immature Granulocytes 0.6 (*)     Immature Grans (Abs) 0.05 (*)     Lymph # 0.6 (*)     Gran % 85.0 (*)     Lymph % 7.2 (*)     All other components within normal limits   URINALYSIS MICROSCOPIC - Abnormal; Notable for the following components:    RBC, UA >100 (*)     WBC, UA >100 (*)     Bacteria Many (*)     All other components within normal limits    Narrative:     Specimen Source->Urine   CULTURE, URINE   MAGNESIUM   LACTIC ACID, PLASMA   TROPONIN I     EKG Readings: (Independently Interpreted)   This patient is in a normal sinus rhythm with a heart rate of 87.  There is normal axis there are low voltage QRS complexes.  There is poor R-wave progression across the precordium.  The  patient has a borderline interventricular conduction delay.  There is no definite evidence of acute myocardial infarction or malignant arrhythmia.       Imaging Results          X-Ray Chest AP Portable (Final result)  Result time 10/31/20 11:13:57    Final result by Alex Calixto MD (10/31/20 11:13:57)                 Impression:      As above.      Electronically signed by: Alex Calixto  Date:    10/31/2020  Time:    11:13             Narrative:    EXAMINATION:  XR CHEST AP PORTABLE    CLINICAL HISTORY:  chest pain;    TECHNIQUE:  Single frontal view of the chest was performed.    COMPARISON:  Chest radiograph January 13, 2019    FINDINGS:  Lungs are clear without focal consolidation, edema, or mass.  There is no pneumothorax or pleural effusion.    Cardiomediastinal silhouette is within normal limits.  Atherosclerotic calcifications are seen within the aortic arch.  No acute osseous abnormality.                              Medical decision making:  Given the above, this patient presents to the emergency room with weakness.  She is found to have dehydration and urinary tract infection.  The patient was rehydrated in the emergency room with IV crystalloid.  She was treated with IV Rocephin.  She denied DC urea but had urinary incontinence.  I think she is likely having bladder spasms producing the incontinence.  I do not think that she is septic.  There is no fever tachycardia or elevated white blood count.  I will discharge her to push oral fluids.  I will treat for nausea.  I will have her return if she gets worse or if new problems develop.  I will have her follow up with primary care this week.                Scribe Attestation:   Scribe #1: I performed the above scribed service and the documentation accurately describes the services I performed. I attest to the accuracy of the note.                      Clinical Impression:     ICD-10-CM ICD-9-CM   1. Acute cystitis with hematuria  N30.01 595.0   2.  Weakness  R53.1 780.79   3. Anorexia  R63.0 783.0   4. Fatigue, unspecified type  R53.83 780.79                          ED Disposition Condition    Discharge Stable        ED Prescriptions     Medication Sig Dispense Start Date End Date Auth. Provider    cephALEXin (KEFLEX) 500 MG capsule Take 2 capsules (1,000 mg total) by mouth every 12 (twelve) hours. for 7 days 28 capsule 10/31/2020 11/7/2020 Danny Marshall MD    ondansetron (ZOFRAN) 4 MG tablet Take 1 tablet (4 mg total) by mouth every 8 (eight) hours as needed (Nausea and vomiting). 12 tablet 10/31/2020  Danny Marshall MD        Follow-up Information     Follow up With Specialties Details Why Contact Info    Gregoria Box MD Internal Medicine, Wound Care In 3 days  [address] [place] [address]  [address]  [place] [id number]  [phone]                        I personally performed the services described in this documentation.  All medical record  entries made by the scribe are at my direction and in my presence.  Signed, Dr. Rolando Marshall MD  10/31/20 1332       Danny Marshall MD  10/31/20 1401       Danny Marshall MD  10/31/20 1405

## 2020-10-31 NOTE — DISCHARGE INSTRUCTIONS
Lots of liquids.  Zofran for nausea.  Keflex for your bladder infection.  Return if you get worse or if new problems develop.  Rest.  Please follow-up with your primary care doctor, or the physician above this week.  Rest.

## 2020-11-02 LAB — BACTERIA UR CULT: ABNORMAL

## 2020-11-05 ENCOUNTER — TELEPHONE (OUTPATIENT)
Dept: UROLOGY | Facility: CLINIC | Age: 80
End: 2020-11-05

## 2020-11-05 NOTE — TELEPHONE ENCOUNTER
Spoke to Sana with Dr. Ott is looking for a urologist to assist with stent placements .. whom ever is available .. November 12th Thursday..

## 2020-11-08 RX ORDER — CEPHALEXIN 500 MG/1
500 CAPSULE ORAL EVERY 12 HOURS
Qty: 14 CAPSULE | Refills: 0 | Status: CANCELLED | OUTPATIENT
Start: 2020-11-08 | End: 2020-11-15

## 2020-11-08 NOTE — PROGRESS NOTES
Patient ID: Annette Fallon is a 80 y.o. female.    Chief Complaint:Pre operative appointment    Referral: New patient, Dr. Ranjit Ott    HPI  79 yo woman with hx of blindness, recently diagnosed colon cancer 1 month ago, depression HTN, GERD. Patient scheduled to undergo colorectal procedure with Dr. Ranjit Ott on 11/12 and will need bilateral ureteral stents placed. Of note patient seen in ER 10/31 for UTI, dysuria, abdominal pain, incontinence; given Rocephin and antibiotics for which the patient finished today. Patient denies dysuria, abdominal pain today.     ROS  Review of Systems   Constitutional: Negative for chills, diaphoresis, fatigue and fever.   HENT: Negative for congestion and rhinorrhea.    Eyes: Positive for visual disturbance (total blindness).   Respiratory: Negative for cough and wheezing.    Gastrointestinal: Positive for abdominal pain, blood in stool and constipation. Negative for abdominal distention, diarrhea, nausea, rectal pain and vomiting.   Genitourinary: Negative for difficulty urinating, dyspareunia, dysuria, enuresis, flank pain, frequency, hematuria and urgency.   Skin: Negative for color change, pallor, rash and wound.   Allergic/Immunologic: Negative for immunocompromised state.   Neurological: Negative for dizziness and weakness.   Psychiatric/Behavioral: Negative for confusion. The patient is not nervous/anxious.          Past Medical History  Active Ambulatory Problems     Diagnosis Date Noted    Fatigue 05/01/2015    Acute febrile illness 05/01/2015    Essential hypertension 10/25/2017    Tobacco abuse 10/25/2017    Dementia without behavioral disturbance 10/25/2017    Other chronic pain 10/25/2017    Restless leg syndrome, controlled 10/25/2017    GERD without esophagitis 10/25/2017    Depression 01/13/2019    Altered mental status 01/13/2019    Hyperlipidemia 01/13/2019    Multiple pulmonary nodules 03/20/2019    Chest pain, atypical 10/16/2020      Resolved Ambulatory Problems     Diagnosis Date Noted    Pneumonia of right lower lobe due to infectious organism 10/25/2017    Sepsis 10/25/2017     Past Medical History:   Diagnosis Date    GERD (gastroesophageal reflux disease)     History of radiofrequency ablation procedure for cardiac arrhythmia     Hypertension     Memory loss     Osteoporosis     Palpitations          Past Surgical History  Past Surgical History:   Procedure Laterality Date    ANKLE SURGERY Left     APPENDECTOMY       SECTION      x2    EYE SURGERY Left     HIP SURGERY      HIP SURGERY Left     HYSTERECTOMY      ROTATOR CUFF REPAIR      L arm    TONSILLECTOMY         Social History  Relationships   Social connections    Talks on phone: Not on file    Gets together: Not on file    Attends [employer] service: Not on file    Active member of club or organization: Not on file    Attends meetings of clubs or organizations: Not on file    Relationship status: Not on file       Medications    Current Outpatient Medications:     alendronate (FOSAMAX) 70 MG tablet, Take 70 mg by mouth every 7 days., Disp: , Rfl:     alprazolam (XANAX) 1 MG tablet, Take 1 mg by mouth 2 (two) times daily., Disp: , Rfl:     amLODIPine (NORVASC) 5 MG tablet, Take 5 mg by mouth once daily., Disp: , Rfl:     atorvastatin (LIPITOR) 10 MG tablet, Take 20 mg by mouth once daily. , Disp: , Rfl:     baclofen (LIORESAL) 10 MG tablet, Take 1 tablet (10 mg total) by mouth 3 (three) times daily., Disp: 90 tablet, Rfl: 0    calcium carbonate (OS-JOSÉ ANTONIO) 500 mg calcium (1,250 mg) tablet, Take 1 tablet by mouth once daily., Disp: , Rfl:     donepezil (ARICEPT) 10 MG tablet, Take 10 mg by mouth every evening., Disp: , Rfl:     ergocalciferol (VITAMIN D2) 50,000 unit Cap, Take 50,000 Units by mouth every 7 days., Disp: , Rfl:     escitalopram oxalate (LEXAPRO) 10 MG tablet, Take 10 mg by mouth once daily., Disp: , Rfl:     famotidine (PEPCID) 20  MG tablet, Take 1 tablet (20 mg total) by mouth 2 (two) times daily., Disp: 60 tablet, Rfl: 0    ferrous sulfate 325 (65 FE) MG EC tablet, Take 1 tablet (325 mg total) by mouth once daily., Disp: , Rfl: 0    FLUoxetine 20 MG capsule, Take 20 mg by mouth once daily., Disp: , Rfl:     fluticasone (FLONASE) 50 mcg/actuation nasal spray, 1 spray by Each Nare route once daily., Disp: , Rfl:     gabapentin (NEURONTIN) 100 MG capsule, Take 300 mg by mouth once daily. , Disp: , Rfl:     guaiFENesin (MUCINEX) 600 mg 12 hr tablet, Take 2 tablets (1,200 mg total) by mouth 2 (two) times daily., Disp: , Rfl:     hydrocodone-acetaminophen 10-325mg (NORCO)  mg Tab, Take by mouth., Disp: , Rfl:     lisinopril (PRINIVIL,ZESTRIL) 40 MG tablet, Take 40 mg by mouth once daily., Disp: , Rfl:     loratadine (CLARITIN) 10 mg tablet, Take 10 mg by mouth once daily., Disp: , Rfl:     meloxicam (MOBIC) 15 MG tablet, Take 1 tablet (15 mg total) by mouth once daily. (Patient not taking: Reported on 10/16/2020), Disp: 30 tablet, Rfl: 0    memantine (NAMENDA) 5 MG Tab, Take 5 mg by mouth once daily., Disp: , Rfl:     metoprolol succinate (TOPROL-XL) 25 MG 24 hr tablet, Take 25 mg by mouth once daily., Disp: , Rfl:     nicotine, polacrilex, (NICORETTE) 2 mg Gum, 1-2 per hour in place of a cigarette. Limit to 15 a day - oral., Disp: 190 each, Rfl: 0    omeprazole (PRILOSEC) 20 MG capsule, Take 20 mg by mouth once daily., Disp: , Rfl:     ondansetron (ZOFRAN) 4 MG tablet, Take 1 tablet (4 mg total) by mouth every 8 (eight) hours as needed (Nausea and vomiting)., Disp: 12 tablet, Rfl: 0    oxybutynin (DITROPAN) 5 MG Tab, Take 5 mg by mouth 2 (two) times daily., Disp: , Rfl:     ropinirole (REQUIP) 0.25 MG tablet, Take 0.25 mg by mouth 2 (two) times daily., Disp: , Rfl:     tizanidine (ZANAFLEX) 4 MG tablet, Take 4 mg by mouth every 6 (six) hours as needed., Disp: , Rfl:     Allergies  Review of patient's allergies  indicates:  No Known Allergies    Patient's PMH, FH, Social hx, Medications, allergies reviewed and updated as pertinent to today's visit    Objective:      Physical Exam  Constitutional:       General: She is not in acute distress.     Appearance: Normal appearance. She is not ill-appearing or toxic-appearing.   HENT:      Head: Normocephalic.      Nose: No congestion or rhinorrhea.   Neck:      Musculoskeletal: Normal range of motion.   Cardiovascular:      Rate and Rhythm: Normal rate and regular rhythm.   Pulmonary:      Effort: Pulmonary effort is normal. No respiratory distress.   Abdominal:      General: Abdomen is flat. There is no distension.      Palpations: Abdomen is soft. There is no mass.      Tenderness: There is no abdominal tenderness. There is no right CVA tenderness, left CVA tenderness, guarding or rebound.   Musculoskeletal:         General: No deformity or signs of injury.      Right lower leg: Edema present.      Left lower leg: Edema present.   Skin:     General: Skin is warm and dry.      Capillary Refill: Capillary refill takes less than 2 seconds.      Findings: No bruising or rash.   Neurological:      General: No focal deficit present.      Mental Status: She is alert and oriented to person, place, and time.   Psychiatric:         Mood and Affect: Mood normal.         Behavior: Behavior normal.               Assessment:       1. Acute cystitis without hematuria        Plan:          Discussed with patient stents that will be placed at the time of surgery, 11/12 are not dissolvable and will need to be removed. If they are not removed prior to the end of the case we will schedule a follow up appointment for the patient for stent removal after she has recovered from surgery.    Patient verbalized understanding    Informed consent reviewed and signed with  present

## 2020-11-08 NOTE — H&P (VIEW-ONLY)
Patient ID: Annette Fallon is a 80 y.o. female.    Chief Complaint:Pre operative appointment    Referral: New patient, Dr. Ranjit Ott    HPI  81 yo woman with hx of blindness, recently diagnosed colon cancer 1 month ago, depression HTN, GERD. Patient scheduled to undergo colorectal procedure with Dr. Ranjit Ott on 11/12 and will need bilateral ureteral stents placed. Of note patient seen in ER 10/31 for UTI, dysuria, abdominal pain, incontinence; given Rocephin and antibiotics for which the patient finished today. Patient denies dysuria, abdominal pain today.     ROS  Review of Systems   Constitutional: Negative for chills, diaphoresis, fatigue and fever.   HENT: Negative for congestion and rhinorrhea.    Eyes: Positive for visual disturbance (total blindness).   Respiratory: Negative for cough and wheezing.    Gastrointestinal: Positive for abdominal pain, blood in stool and constipation. Negative for abdominal distention, diarrhea, nausea, rectal pain and vomiting.   Genitourinary: Negative for difficulty urinating, dyspareunia, dysuria, enuresis, flank pain, frequency, hematuria and urgency.   Skin: Negative for color change, pallor, rash and wound.   Allergic/Immunologic: Negative for immunocompromised state.   Neurological: Negative for dizziness and weakness.   Psychiatric/Behavioral: Negative for confusion. The patient is not nervous/anxious.          Past Medical History  Active Ambulatory Problems     Diagnosis Date Noted    Fatigue 05/01/2015    Acute febrile illness 05/01/2015    Essential hypertension 10/25/2017    Tobacco abuse 10/25/2017    Dementia without behavioral disturbance 10/25/2017    Other chronic pain 10/25/2017    Restless leg syndrome, controlled 10/25/2017    GERD without esophagitis 10/25/2017    Depression 01/13/2019    Altered mental status 01/13/2019    Hyperlipidemia 01/13/2019    Multiple pulmonary nodules 03/20/2019    Chest pain, atypical 10/16/2020      Resolved Ambulatory Problems     Diagnosis Date Noted    Pneumonia of right lower lobe due to infectious organism 10/25/2017    Sepsis 10/25/2017     Past Medical History:   Diagnosis Date    GERD (gastroesophageal reflux disease)     History of radiofrequency ablation procedure for cardiac arrhythmia     Hypertension     Memory loss     Osteoporosis     Palpitations          Past Surgical History  Past Surgical History:   Procedure Laterality Date    ANKLE SURGERY Left     APPENDECTOMY       SECTION      x2    EYE SURGERY Left     HIP SURGERY      HIP SURGERY Left     HYSTERECTOMY      ROTATOR CUFF REPAIR      L arm    TONSILLECTOMY         Social History  Relationships   Social connections    Talks on phone: Not on file    Gets together: Not on file    Attends Jewish service: Not on file    Active member of club or organization: Not on file    Attends meetings of clubs or organizations: Not on file    Relationship status: Not on file       Medications    Current Outpatient Medications:     alendronate (FOSAMAX) 70 MG tablet, Take 70 mg by mouth every 7 days., Disp: , Rfl:     alprazolam (XANAX) 1 MG tablet, Take 1 mg by mouth 2 (two) times daily., Disp: , Rfl:     amLODIPine (NORVASC) 5 MG tablet, Take 5 mg by mouth once daily., Disp: , Rfl:     atorvastatin (LIPITOR) 10 MG tablet, Take 20 mg by mouth once daily. , Disp: , Rfl:     baclofen (LIORESAL) 10 MG tablet, Take 1 tablet (10 mg total) by mouth 3 (three) times daily., Disp: 90 tablet, Rfl: 0    calcium carbonate (OS-JOSÉ ANTONIO) 500 mg calcium (1,250 mg) tablet, Take 1 tablet by mouth once daily., Disp: , Rfl:     donepezil (ARICEPT) 10 MG tablet, Take 10 mg by mouth every evening., Disp: , Rfl:     ergocalciferol (VITAMIN D2) 50,000 unit Cap, Take 50,000 Units by mouth every 7 days., Disp: , Rfl:     escitalopram oxalate (LEXAPRO) 10 MG tablet, Take 10 mg by mouth once daily., Disp: , Rfl:     famotidine (PEPCID) 20  MG tablet, Take 1 tablet (20 mg total) by mouth 2 (two) times daily., Disp: 60 tablet, Rfl: 0    ferrous sulfate 325 (65 FE) MG EC tablet, Take 1 tablet (325 mg total) by mouth once daily., Disp: , Rfl: 0    FLUoxetine 20 MG capsule, Take 20 mg by mouth once daily., Disp: , Rfl:     fluticasone (FLONASE) 50 mcg/actuation nasal spray, 1 spray by Each Nare route once daily., Disp: , Rfl:     gabapentin (NEURONTIN) 100 MG capsule, Take 300 mg by mouth once daily. , Disp: , Rfl:     guaiFENesin (MUCINEX) 600 mg 12 hr tablet, Take 2 tablets (1,200 mg total) by mouth 2 (two) times daily., Disp: , Rfl:     hydrocodone-acetaminophen 10-325mg (NORCO)  mg Tab, Take by mouth., Disp: , Rfl:     lisinopril (PRINIVIL,ZESTRIL) 40 MG tablet, Take 40 mg by mouth once daily., Disp: , Rfl:     loratadine (CLARITIN) 10 mg tablet, Take 10 mg by mouth once daily., Disp: , Rfl:     meloxicam (MOBIC) 15 MG tablet, Take 1 tablet (15 mg total) by mouth once daily. (Patient not taking: Reported on 10/16/2020), Disp: 30 tablet, Rfl: 0    memantine (NAMENDA) 5 MG Tab, Take 5 mg by mouth once daily., Disp: , Rfl:     metoprolol succinate (TOPROL-XL) 25 MG 24 hr tablet, Take 25 mg by mouth once daily., Disp: , Rfl:     nicotine, polacrilex, (NICORETTE) 2 mg Gum, 1-2 per hour in place of a cigarette. Limit to 15 a day - oral., Disp: 190 each, Rfl: 0    omeprazole (PRILOSEC) 20 MG capsule, Take 20 mg by mouth once daily., Disp: , Rfl:     ondansetron (ZOFRAN) 4 MG tablet, Take 1 tablet (4 mg total) by mouth every 8 (eight) hours as needed (Nausea and vomiting)., Disp: 12 tablet, Rfl: 0    oxybutynin (DITROPAN) 5 MG Tab, Take 5 mg by mouth 2 (two) times daily., Disp: , Rfl:     ropinirole (REQUIP) 0.25 MG tablet, Take 0.25 mg by mouth 2 (two) times daily., Disp: , Rfl:     tizanidine (ZANAFLEX) 4 MG tablet, Take 4 mg by mouth every 6 (six) hours as needed., Disp: , Rfl:     Allergies  Review of patient's allergies  indicates:  No Known Allergies    Patient's PMH, FH, Social hx, Medications, allergies reviewed and updated as pertinent to today's visit    Objective:      Physical Exam  Constitutional:       General: She is not in acute distress.     Appearance: Normal appearance. She is not ill-appearing or toxic-appearing.   HENT:      Head: Normocephalic.      Nose: No congestion or rhinorrhea.   Neck:      Musculoskeletal: Normal range of motion.   Cardiovascular:      Rate and Rhythm: Normal rate and regular rhythm.   Pulmonary:      Effort: Pulmonary effort is normal. No respiratory distress.   Abdominal:      General: Abdomen is flat. There is no distension.      Palpations: Abdomen is soft. There is no mass.      Tenderness: There is no abdominal tenderness. There is no right CVA tenderness, left CVA tenderness, guarding or rebound.   Musculoskeletal:         General: No deformity or signs of injury.      Right lower leg: Edema present.      Left lower leg: Edema present.   Skin:     General: Skin is warm and dry.      Capillary Refill: Capillary refill takes less than 2 seconds.      Findings: No bruising or rash.   Neurological:      General: No focal deficit present.      Mental Status: She is alert and oriented to person, place, and time.   Psychiatric:         Mood and Affect: Mood normal.         Behavior: Behavior normal.               Assessment:       1. Acute cystitis without hematuria        Plan:          Discussed with patient stents that will be placed at the time of surgery, 11/12 are not dissolvable and will need to be removed. If they are not removed prior to the end of the case we will schedule a follow up appointment for the patient for stent removal after she has recovered from surgery.    Patient verbalized understanding    Informed consent reviewed and signed with  present

## 2020-11-09 ENCOUNTER — HOSPITAL ENCOUNTER (OUTPATIENT)
Dept: PREADMISSION TESTING | Facility: HOSPITAL | Age: 80
Discharge: HOME | DRG: 330 | End: 2020-11-09
Attending: STUDENT IN AN ORGANIZED HEALTH CARE EDUCATION/TRAINING PROGRAM
Payer: MEDICARE

## 2020-11-09 ENCOUNTER — OFFICE VISIT (OUTPATIENT)
Dept: UROLOGY | Facility: CLINIC | Age: 80
DRG: 330 | End: 2020-11-09
Payer: MEDICARE

## 2020-11-09 ENCOUNTER — ANESTHESIA EVENT (OUTPATIENT)
Dept: SURGERY | Facility: HOSPITAL | Age: 80
DRG: 330 | End: 2020-11-09
Payer: MEDICARE

## 2020-11-09 VITALS
SYSTOLIC BLOOD PRESSURE: 138 MMHG | HEIGHT: 65 IN | BODY MASS INDEX: 25.83 KG/M2 | WEIGHT: 155 LBS | DIASTOLIC BLOOD PRESSURE: 80 MMHG

## 2020-11-09 VITALS
TEMPERATURE: 98 F | BODY MASS INDEX: 25.51 KG/M2 | WEIGHT: 158.75 LBS | RESPIRATION RATE: 18 BRPM | SYSTOLIC BLOOD PRESSURE: 98 MMHG | HEIGHT: 66 IN | OXYGEN SATURATION: 97 % | DIASTOLIC BLOOD PRESSURE: 60 MMHG | HEART RATE: 78 BPM

## 2020-11-09 DIAGNOSIS — C19 COLORECTAL CANCER: ICD-10-CM

## 2020-11-09 DIAGNOSIS — Z01.818 PREOP TESTING: ICD-10-CM

## 2020-11-09 DIAGNOSIS — N30.00 ACUTE CYSTITIS WITHOUT HEMATURIA: Primary | ICD-10-CM

## 2020-11-09 LAB
ABO + RH BLD: NORMAL
ALBUMIN SERPL BCP-MCNC: 3.2 G/DL (ref 3.5–5.2)
ALP SERPL-CCNC: 100 U/L (ref 55–135)
ALT SERPL W/O P-5'-P-CCNC: 7 U/L (ref 10–44)
ANION GAP SERPL CALC-SCNC: 10 MMOL/L (ref 8–16)
AST SERPL-CCNC: 13 U/L (ref 10–40)
BASOPHILS # BLD AUTO: 0.03 K/UL (ref 0–0.2)
BASOPHILS NFR BLD: 0.4 % (ref 0–1.9)
BILIRUB SERPL-MCNC: 0.3 MG/DL (ref 0.1–1)
BLD GP AB SCN CELLS X3 SERPL QL: NORMAL
BUN SERPL-MCNC: 20 MG/DL (ref 8–23)
CALCIUM SERPL-MCNC: 9.6 MG/DL (ref 8.7–10.5)
CHLORIDE SERPL-SCNC: 103 MMOL/L (ref 95–110)
CO2 SERPL-SCNC: 29 MMOL/L (ref 23–29)
CREAT SERPL-MCNC: 1.3 MG/DL (ref 0.5–1.4)
DIFFERENTIAL METHOD: ABNORMAL
EOSINOPHIL # BLD AUTO: 0.1 K/UL (ref 0–0.5)
EOSINOPHIL NFR BLD: 0.8 % (ref 0–8)
ERYTHROCYTE [DISTWIDTH] IN BLOOD BY AUTOMATED COUNT: 16.3 % (ref 11.5–14.5)
EST. GFR  (AFRICAN AMERICAN): 44 ML/MIN/1.73 M^2
EST. GFR  (NON AFRICAN AMERICAN): 39 ML/MIN/1.73 M^2
GLUCOSE SERPL-MCNC: 112 MG/DL (ref 70–110)
HCT VFR BLD AUTO: 33.7 % (ref 37–48.5)
HGB BLD-MCNC: 11.1 G/DL (ref 12–16)
IMM GRANULOCYTES # BLD AUTO: 0.06 K/UL (ref 0–0.04)
IMM GRANULOCYTES NFR BLD AUTO: 0.7 % (ref 0–0.5)
LYMPHOCYTES # BLD AUTO: 1.3 K/UL (ref 1–4.8)
LYMPHOCYTES NFR BLD: 15.3 % (ref 18–48)
MCH RBC QN AUTO: 27.5 PG (ref 27–31)
MCHC RBC AUTO-ENTMCNC: 32.9 G/DL (ref 32–36)
MCV RBC AUTO: 83 FL (ref 82–98)
MONOCYTES # BLD AUTO: 0.7 K/UL (ref 0.3–1)
MONOCYTES NFR BLD: 7.8 % (ref 4–15)
NEUTROPHILS # BLD AUTO: 6.2 K/UL (ref 1.8–7.7)
NEUTROPHILS NFR BLD: 75 % (ref 38–73)
NRBC BLD-RTO: 0 /100 WBC
PLATELET # BLD AUTO: 267 K/UL (ref 150–350)
PMV BLD AUTO: 9.8 FL (ref 9.2–12.9)
POTASSIUM SERPL-SCNC: 4.4 MMOL/L (ref 3.5–5.1)
PROT SERPL-MCNC: 6.5 G/DL (ref 6–8.4)
RBC # BLD AUTO: 4.04 M/UL (ref 4–5.4)
SARS-COV-2 RDRP RESP QL NAA+PROBE: NEGATIVE
SODIUM SERPL-SCNC: 142 MMOL/L (ref 136–145)
WBC # BLD AUTO: 8.32 K/UL (ref 3.9–12.7)

## 2020-11-09 PROCEDURE — 1159F PR MEDICATION LIST DOCUMENTED IN MEDICAL RECORD: ICD-10-PCS | Mod: S$GLB,,, | Performed by: STUDENT IN AN ORGANIZED HEALTH CARE EDUCATION/TRAINING PROGRAM

## 2020-11-09 PROCEDURE — 3075F PR MOST RECENT SYSTOLIC BLOOD PRESS GE 130-139MM HG: ICD-10-PCS | Mod: CPTII,S$GLB,, | Performed by: STUDENT IN AN ORGANIZED HEALTH CARE EDUCATION/TRAINING PROGRAM

## 2020-11-09 PROCEDURE — 3288F PR FALLS RISK ASSESSMENT DOCUMENTED: ICD-10-PCS | Mod: CPTII,S$GLB,, | Performed by: STUDENT IN AN ORGANIZED HEALTH CARE EDUCATION/TRAINING PROGRAM

## 2020-11-09 PROCEDURE — 1159F MED LIST DOCD IN RCRD: CPT | Mod: S$GLB,,, | Performed by: STUDENT IN AN ORGANIZED HEALTH CARE EDUCATION/TRAINING PROGRAM

## 2020-11-09 PROCEDURE — 3079F PR MOST RECENT DIASTOLIC BLOOD PRESSURE 80-89 MM HG: ICD-10-PCS | Mod: CPTII,S$GLB,, | Performed by: STUDENT IN AN ORGANIZED HEALTH CARE EDUCATION/TRAINING PROGRAM

## 2020-11-09 PROCEDURE — 3288F FALL RISK ASSESSMENT DOCD: CPT | Mod: CPTII,S$GLB,, | Performed by: STUDENT IN AN ORGANIZED HEALTH CARE EDUCATION/TRAINING PROGRAM

## 2020-11-09 PROCEDURE — 1100F PR PT FALLS ASSESS DOC 2+ FALLS/FALL W/INJURY/YR: ICD-10-PCS | Mod: CPTII,S$GLB,, | Performed by: STUDENT IN AN ORGANIZED HEALTH CARE EDUCATION/TRAINING PROGRAM

## 2020-11-09 PROCEDURE — 99999 PR PBB SHADOW E&M-EST. PATIENT-LVL V: CPT | Mod: PBBFAC,,, | Performed by: STUDENT IN AN ORGANIZED HEALTH CARE EDUCATION/TRAINING PROGRAM

## 2020-11-09 PROCEDURE — 99999 PR PBB SHADOW E&M-EST. PATIENT-LVL V: ICD-10-PCS | Mod: PBBFAC,,, | Performed by: STUDENT IN AN ORGANIZED HEALTH CARE EDUCATION/TRAINING PROGRAM

## 2020-11-09 PROCEDURE — 99203 OFFICE O/P NEW LOW 30 MIN: CPT | Mod: 57,S$GLB,, | Performed by: STUDENT IN AN ORGANIZED HEALTH CARE EDUCATION/TRAINING PROGRAM

## 2020-11-09 PROCEDURE — 1100F PTFALLS ASSESS-DOCD GE2>/YR: CPT | Mod: CPTII,S$GLB,, | Performed by: STUDENT IN AN ORGANIZED HEALTH CARE EDUCATION/TRAINING PROGRAM

## 2020-11-09 PROCEDURE — 86901 BLOOD TYPING SEROLOGIC RH(D): CPT

## 2020-11-09 PROCEDURE — 99203 PR OFFICE/OUTPT VISIT, NEW, LEVL III, 30-44 MIN: ICD-10-PCS | Mod: 57,S$GLB,, | Performed by: STUDENT IN AN ORGANIZED HEALTH CARE EDUCATION/TRAINING PROGRAM

## 2020-11-09 PROCEDURE — 3075F SYST BP GE 130 - 139MM HG: CPT | Mod: CPTII,S$GLB,, | Performed by: STUDENT IN AN ORGANIZED HEALTH CARE EDUCATION/TRAINING PROGRAM

## 2020-11-09 PROCEDURE — U0002 COVID-19 LAB TEST NON-CDC: HCPCS

## 2020-11-09 PROCEDURE — 80053 COMPREHEN METABOLIC PANEL: CPT

## 2020-11-09 PROCEDURE — 1126F AMNT PAIN NOTED NONE PRSNT: CPT | Mod: S$GLB,,, | Performed by: STUDENT IN AN ORGANIZED HEALTH CARE EDUCATION/TRAINING PROGRAM

## 2020-11-09 PROCEDURE — 1126F PR PAIN SEVERITY QUANTIFIED, NO PAIN PRESENT: ICD-10-PCS | Mod: S$GLB,,, | Performed by: STUDENT IN AN ORGANIZED HEALTH CARE EDUCATION/TRAINING PROGRAM

## 2020-11-09 PROCEDURE — 3079F DIAST BP 80-89 MM HG: CPT | Mod: CPTII,S$GLB,, | Performed by: STUDENT IN AN ORGANIZED HEALTH CARE EDUCATION/TRAINING PROGRAM

## 2020-11-09 PROCEDURE — 85025 COMPLETE CBC W/AUTO DIFF WBC: CPT

## 2020-11-09 NOTE — PATIENT INSTRUCTIONS
Stents will be placed 11/12    I will let you know when they will need to be removed depending on the findings intraoperatively, I will schedule your post operative appointment after the procedure.

## 2020-11-09 NOTE — ANESTHESIA PREPROCEDURE EVALUATION
"                                                                                                             2020  Annette Fallon is a 80 y.o., female scheduled for COLECTOMY, RIGHT, LAPAROSCOPIC (Right Abdomen) - COMBINED SURGERY WITH DR. VALERA       CYSTOSCOPY, WITH RETROGRADE PYELOGRAM AND URETERAL STENT INSERTION on 2020.      Followed by Dr. Lima, hx of HTN, HLD, chest pain, Dementia, seen on 10/26/2020, he states: "    Cleared for colon surgery at low cardiac risk."  Past Medical History:   Diagnosis Date    Arthritis     Cancer     Depression     GERD (gastroesophageal reflux disease)     History of radiofrequency ablation procedure for cardiac arrhythmia     Hypertension     Memory loss     Osteoporosis     Palpitations     Sleep apnea        Past Surgical History:   Procedure Laterality Date    ANKLE SURGERY Left     APPENDECTOMY       SECTION      x2    EYE SURGERY Left     HIP SURGERY      HIP SURGERY Left     HYSTERECTOMY      ROTATOR CUFF REPAIR      L arm    TONSILLECTOMY       Echo 10/20/20  · The left ventricle is normal in size with normal systolic function. The estimated ejection fraction is 55%.  · Grade II diastolic dysfunction.  · Normal right ventricular systolic function.  · Mild left atrial enlargement.  · Moderate tricuspid regurgitation.  · Normal central venous pressure (3 mmHg).  · The estimated PA systolic pressure is 55 mmHg.  · There is pulmonary hypertension.     Stress test 10/20/20    The study shows normal myocardial perfusion.    The perfusion scan is free of evidence from myocardial ischemia or injury.    There is a  mild to moderate intensity fixed defect in the inferior wall of the left ventricle secondary to diaphragm attenuation.    Gated perfusion images showed an ejection fraction of 87%    There is normal wall motion at rest and post stress.    The EKG portion of this study is negative for ischemia.    The patient " reported no chest pain during the stress test.    There were no arrhythmias during stress.    CXR 10/31/2020:  FINDINGS:  Lungs are clear without focal consolidation, edema, or mass.  There is no pneumothorax or pleural effusion.     Cardiomediastinal silhouette is within normal limits.  Atherosclerotic calcifications are seen within the aortic arch.  No acute osseous abnormality.     Impression:     As above.        Electronically signed by: Alex Becker  Date:                                            10/31/2020  Time:                                           11:13    Anesthesia Evaluation    I have reviewed the Patient Summary Reports.    I have reviewed the Nursing Notes. I have reviewed the NPO Status.   I have reviewed the Medications.     Review of Systems  Anesthesia Hx:  No problems with previous Anesthesia  Denies Family Hx of Anesthesia complications.   Denies Personal Hx of Anesthesia complications.   Social:  Former Smoker    Hematology/Oncology:  Hematology Normal      Current/Recent Cancer. Oncology Comments: Colon    EENT/Dental:EENT/Dental Normal   Cardiovascular:   Hypertension    Pulmonary:   Sleep Apnea    Education provided regarding risk of obstructive sleep apnea     Renal/:  Renal/ Normal     Hepatic/GI:   GERD Colon ca   Neurological:  Neurology Normal    Endocrine:  Endocrine Normal    Psych:   Psychiatric History Dementia         Physical Exam  General:  Well nourished       Chest/Lungs:  Chest/Lungs Clear    Heart/Vascular:  Heart Findings: Normal            Anesthesia Plan  Type of Anesthesia, risks & benefits discussed:  Anesthesia Type:  general  Patient's Preference:   Intra-op Monitoring Plan: standard ASA monitors  Intra-op Monitoring Plan Comments:   Post Op Pain Control Plan:   Post Op Pain Control Plan Comments:   Induction:   IV  Beta Blocker:  Patient is not currently on a Beta-Blocker (No further documentation required).       Informed Consent: Patient  "representative understands risks and agrees with Anesthesia plan.  Questions answered. Anesthesia consent signed with patient representative.  ASA Score: 3     Day of Surgery Review of History & Physical:    H&P update referred to the provider.     Anesthesia Plan Notes: Anesthesia consent to be signed morning of procedure on 11/12/2020.  Followed by Dr. Lima, hx of HTN, HLD, chest pain, Dementia, seen on 10/26/2020, he states: "Cleared for colon surgery at low cardiac risk."          Ready For Surgery From Anesthesia Perspective.       "

## 2020-11-09 NOTE — PRE ADMISSION SCREENING
"Pt BP was 85/60, did recheck--98/60 manual, pt state she "feels totally fine", drank 2 cups water since first BP---"bp pills kicked in".  Sister in law states she will check BP again later and if becomes lower or if pt gets dizzy or lightheaded she will call pt md or cardiologist.    NP, NENA Fields, interviewed pt, we both instructed pt and sister in law to check BP this evening and also in morning before her meds. If BP remains low or if pt becomes symptomatic,  to hold the meds and to call her cardiologist.  + understanding from pt and her family. Instructed to stay hydrated as well.      "

## 2020-11-09 NOTE — DISCHARGE INSTRUCTIONS
Your procedure  is scheduled for _Thursday, 11/12/2020____.  Report to the ER the day of your surgery for a temperature check.  You will then be escorted to the Same Day Surgery unit.    Call [phone] between 2pm and 5pm on _Wednesday, 11/11/2020__ to find out your arrival time for the day of surgery. Time:_________.    Important instructions:   Do not eat or drink after 12 midnight, including water.  It is okay to brush your teeth.  Do not have gum, candy or mints.     See attached medication sheet - only take a small sip of water with any medications the morning of your surgery.    STOP taking Aspirin, Ibuprofen, Motrin, Mobic, Advil, Aleve, Fish oil, and Vitamin E for at least 7 days before your surgery. You may use Tylenol unless otherwise instructed by your doctor.  Follow your doctor or surgeon's instructions concerning prescribed blood thinners such as: Plavix, Eliquis, Xarelto, Coumadin, Warfarin, etc.     Prep instructions:    SHOWER  And  OTHER __Follow all directions DR. Ott gave you about meds/bowel prep, etc     Please shower the night before and the morning of your surgery.      Use Hibiclens soap as instructed by your pre op nurse (do not use on face or genital area).   Please place clean linens on your bed the night before surgery and wear fresh clean clothing after each shower.     DO NOT shave procedural area at least 5 days before surgery due to increased risk of skin irritation and/or possible infection.     You may be asked to take a third shower on arrival to Same Day Surgery - depending on the type of surgery you are having.     You may wear deodorant only. Do not wear make- up, mascara, powder, lotion, perfume, or cologne.     Do not wear any jewelry or have anything metal on your body (hairpins, clips, etc.).     Please bring any documents given to you by your doctor.     If you are going home on the same day of surgery, you must arrange for a family member or a friend to  drive you home.  Public transportation is prohibited.  You will not be able to drive home if you were given anesthesia or sedation.     Wear loose fitting clothes allowing for bandages.     Leave money and valuables home, but you may bring a cell phone.     Important: Call your surgeon if fever, chills, or illness should occur before your surgery. Call us at the Pre-op Center at [phone]  if needed.

## 2020-11-12 ENCOUNTER — HOSPITAL ENCOUNTER (INPATIENT)
Facility: HOSPITAL | Age: 80
LOS: 6 days | Discharge: HOME | DRG: 330 | End: 2020-11-18
Attending: SURGERY | Admitting: SURGERY
Payer: MEDICARE

## 2020-11-12 ENCOUNTER — ANESTHESIA (OUTPATIENT)
Dept: SURGERY | Facility: HOSPITAL | Age: 80
DRG: 330 | End: 2020-11-12
Payer: MEDICARE

## 2020-11-12 DIAGNOSIS — Z01.818 PREOP TESTING: Primary | ICD-10-CM

## 2020-11-12 DIAGNOSIS — C18.0 CARCINOMA OF CECUM: ICD-10-CM

## 2020-11-12 DIAGNOSIS — C18.0 CANCER OF CECUM: ICD-10-CM

## 2020-11-12 PROCEDURE — 27201423 OPTIME MED/SURG SUP & DEVICES STERILE SUPPLY: Performed by: SURGERY

## 2020-11-12 PROCEDURE — 63600175 PHARM REV CODE 636 W HCPCS: Performed by: ANESTHESIOLOGY

## 2020-11-12 PROCEDURE — 25000003 PHARM REV CODE 250: Performed by: SURGERY

## 2020-11-12 PROCEDURE — D9220A PRA ANESTHESIA: Mod: ANES,,, | Performed by: ANESTHESIOLOGY

## 2020-11-12 PROCEDURE — 63600175 PHARM REV CODE 636 W HCPCS: Performed by: SURGERY

## 2020-11-12 PROCEDURE — 11000001 HC ACUTE MED/SURG PRIVATE ROOM

## 2020-11-12 PROCEDURE — D9220A PRA ANESTHESIA: ICD-10-PCS | Mod: CRNA,,, | Performed by: NURSE ANESTHETIST, CERTIFIED REGISTERED

## 2020-11-12 PROCEDURE — 36000711: Performed by: SURGERY

## 2020-11-12 PROCEDURE — 36000710: Performed by: SURGERY

## 2020-11-12 PROCEDURE — 52005 CYSTO W/URTRL CATHJ: CPT | Mod: ,,, | Performed by: STUDENT IN AN ORGANIZED HEALTH CARE EDUCATION/TRAINING PROGRAM

## 2020-11-12 PROCEDURE — 52005 PR CYSTOURETHROSCOPY,URETER CATHETER: ICD-10-PCS | Mod: ,,, | Performed by: STUDENT IN AN ORGANIZED HEALTH CARE EDUCATION/TRAINING PROGRAM

## 2020-11-12 PROCEDURE — 74420 UROGRAPHY RTRGR +-KUB: CPT | Mod: 26,,, | Performed by: STUDENT IN AN ORGANIZED HEALTH CARE EDUCATION/TRAINING PROGRAM

## 2020-11-12 PROCEDURE — 37000008 HC ANESTHESIA 1ST 15 MINUTES: Performed by: SURGERY

## 2020-11-12 PROCEDURE — 25500020 PHARM REV CODE 255: Performed by: STUDENT IN AN ORGANIZED HEALTH CARE EDUCATION/TRAINING PROGRAM

## 2020-11-12 PROCEDURE — 94761 N-INVAS EAR/PLS OXIMETRY MLT: CPT

## 2020-11-12 PROCEDURE — 37000009 HC ANESTHESIA EA ADD 15 MINS: Performed by: SURGERY

## 2020-11-12 PROCEDURE — 25000003 PHARM REV CODE 250: Performed by: NURSE ANESTHETIST, CERTIFIED REGISTERED

## 2020-11-12 PROCEDURE — 74420 PR  X-RAY RETROGRADE PYELOGRAM: ICD-10-PCS | Mod: 26,,, | Performed by: STUDENT IN AN ORGANIZED HEALTH CARE EDUCATION/TRAINING PROGRAM

## 2020-11-12 PROCEDURE — C1758 CATHETER, URETERAL: HCPCS | Performed by: SURGERY

## 2020-11-12 PROCEDURE — 27000221 HC OXYGEN, UP TO 24 HOURS

## 2020-11-12 PROCEDURE — D9220A PRA ANESTHESIA: ICD-10-PCS | Mod: ANES,,, | Performed by: ANESTHESIOLOGY

## 2020-11-12 PROCEDURE — 63600175 PHARM REV CODE 636 W HCPCS: Performed by: NURSE ANESTHETIST, CERTIFIED REGISTERED

## 2020-11-12 PROCEDURE — 88307 TISSUE EXAM BY PATHOLOGIST: CPT | Performed by: PATHOLOGY

## 2020-11-12 PROCEDURE — 94799 UNLISTED PULMONARY SVC/PX: CPT

## 2020-11-12 PROCEDURE — 71000033 HC RECOVERY, INTIAL HOUR: Performed by: SURGERY

## 2020-11-12 PROCEDURE — 88307 TISSUE EXAM BY PATHOLOGIST: CPT | Mod: 26,,, | Performed by: PATHOLOGY

## 2020-11-12 PROCEDURE — D9220A PRA ANESTHESIA: Mod: CRNA,,, | Performed by: NURSE ANESTHETIST, CERTIFIED REGISTERED

## 2020-11-12 PROCEDURE — 88307 PR  SURG PATH,LEVEL V: ICD-10-PCS | Mod: 26,,, | Performed by: PATHOLOGY

## 2020-11-12 RX ORDER — MEMANTINE HYDROCHLORIDE 5 MG/1
5 TABLET ORAL DAILY
Status: DISCONTINUED | OUTPATIENT
Start: 2020-11-12 | End: 2020-11-18 | Stop reason: HOSPADM

## 2020-11-12 RX ORDER — ROPINIROLE 0.25 MG/1
0.25 TABLET, FILM COATED ORAL 2 TIMES DAILY
Status: DISCONTINUED | OUTPATIENT
Start: 2020-11-12 | End: 2020-11-18 | Stop reason: HOSPADM

## 2020-11-12 RX ORDER — SODIUM CHLORIDE, SODIUM LACTATE, POTASSIUM CHLORIDE, CALCIUM CHLORIDE 600; 310; 30; 20 MG/100ML; MG/100ML; MG/100ML; MG/100ML
INJECTION, SOLUTION INTRAVENOUS CONTINUOUS
Status: DISCONTINUED | OUTPATIENT
Start: 2020-11-12 | End: 2020-11-12

## 2020-11-12 RX ORDER — LISINOPRIL 20 MG/1
40 TABLET ORAL DAILY
Status: DISCONTINUED | OUTPATIENT
Start: 2020-11-12 | End: 2020-11-18 | Stop reason: HOSPADM

## 2020-11-12 RX ORDER — LIDOCAINE HYDROCHLORIDE 20 MG/ML
INJECTION INTRAVENOUS
Status: DISCONTINUED | OUTPATIENT
Start: 2020-11-12 | End: 2020-11-12

## 2020-11-12 RX ORDER — FLUOXETINE HYDROCHLORIDE 20 MG/1
20 CAPSULE ORAL DAILY
Status: DISCONTINUED | OUTPATIENT
Start: 2020-11-12 | End: 2020-11-12

## 2020-11-12 RX ORDER — ROCURONIUM BROMIDE 10 MG/ML
INJECTION, SOLUTION INTRAVENOUS
Status: DISCONTINUED | OUTPATIENT
Start: 2020-11-12 | End: 2020-11-12

## 2020-11-12 RX ORDER — ONDANSETRON 2 MG/ML
INJECTION INTRAMUSCULAR; INTRAVENOUS
Status: DISCONTINUED | OUTPATIENT
Start: 2020-11-12 | End: 2020-11-12

## 2020-11-12 RX ORDER — PHENYLEPHRINE HYDROCHLORIDE 10 MG/ML
INJECTION INTRAVENOUS
Status: DISCONTINUED | OUTPATIENT
Start: 2020-11-12 | End: 2020-11-12

## 2020-11-12 RX ORDER — NEOSTIGMINE METHYLSULFATE 1 MG/ML
INJECTION, SOLUTION INTRAVENOUS
Status: DISCONTINUED | OUTPATIENT
Start: 2020-11-12 | End: 2020-11-12

## 2020-11-12 RX ORDER — TIZANIDINE 4 MG/1
4 TABLET ORAL EVERY 6 HOURS PRN
Status: DISCONTINUED | OUTPATIENT
Start: 2020-11-12 | End: 2020-11-13

## 2020-11-12 RX ORDER — ESCITALOPRAM OXALATE 10 MG/1
10 TABLET ORAL DAILY
Status: DISCONTINUED | OUTPATIENT
Start: 2020-11-12 | End: 2020-11-12

## 2020-11-12 RX ORDER — ENOXAPARIN SODIUM 100 MG/ML
40 INJECTION SUBCUTANEOUS EVERY 24 HOURS
Status: DISCONTINUED | OUTPATIENT
Start: 2020-11-13 | End: 2020-11-18 | Stop reason: HOSPADM

## 2020-11-12 RX ORDER — HYDROMORPHONE HYDROCHLORIDE 2 MG/ML
0.2 INJECTION, SOLUTION INTRAMUSCULAR; INTRAVENOUS; SUBCUTANEOUS EVERY 5 MIN PRN
Status: DISCONTINUED | OUTPATIENT
Start: 2020-11-12 | End: 2020-11-12 | Stop reason: HOSPADM

## 2020-11-12 RX ORDER — ONDANSETRON 2 MG/ML
4 INJECTION INTRAMUSCULAR; INTRAVENOUS EVERY 6 HOURS PRN
Status: DISCONTINUED | OUTPATIENT
Start: 2020-11-12 | End: 2020-11-18 | Stop reason: HOSPADM

## 2020-11-12 RX ORDER — ALPRAZOLAM 0.5 MG/1
1 TABLET ORAL 2 TIMES DAILY
Status: DISCONTINUED | OUTPATIENT
Start: 2020-11-12 | End: 2020-11-13

## 2020-11-12 RX ORDER — PROPOFOL 10 MG/ML
VIAL (ML) INTRAVENOUS
Status: DISCONTINUED | OUTPATIENT
Start: 2020-11-12 | End: 2020-11-12

## 2020-11-12 RX ORDER — FAMOTIDINE 20 MG/1
20 TABLET, FILM COATED ORAL DAILY
Status: DISCONTINUED | OUTPATIENT
Start: 2020-11-12 | End: 2020-11-18 | Stop reason: HOSPADM

## 2020-11-12 RX ORDER — AMLODIPINE BESYLATE 5 MG/1
5 TABLET ORAL DAILY
Status: DISCONTINUED | OUTPATIENT
Start: 2020-11-12 | End: 2020-11-18 | Stop reason: HOSPADM

## 2020-11-12 RX ORDER — ACETAMINOPHEN 10 MG/ML
1000 INJECTION, SOLUTION INTRAVENOUS ONCE
Status: COMPLETED | OUTPATIENT
Start: 2020-11-12 | End: 2020-11-12

## 2020-11-12 RX ORDER — LIDOCAINE HYDROCHLORIDE 10 MG/ML
1 INJECTION, SOLUTION EPIDURAL; INFILTRATION; INTRACAUDAL; PERINEURAL ONCE
Status: DISCONTINUED | OUTPATIENT
Start: 2020-11-12 | End: 2020-11-12

## 2020-11-12 RX ORDER — GABAPENTIN 300 MG/1
300 CAPSULE ORAL DAILY
Status: DISCONTINUED | OUTPATIENT
Start: 2020-11-12 | End: 2020-11-13

## 2020-11-12 RX ORDER — HYDRALAZINE HYDROCHLORIDE 20 MG/ML
10 INJECTION INTRAMUSCULAR; INTRAVENOUS EVERY 8 HOURS PRN
Status: DISCONTINUED | OUTPATIENT
Start: 2020-11-12 | End: 2020-11-18 | Stop reason: HOSPADM

## 2020-11-12 RX ORDER — SODIUM CHLORIDE, SODIUM LACTATE, POTASSIUM CHLORIDE, CALCIUM CHLORIDE 600; 310; 30; 20 MG/100ML; MG/100ML; MG/100ML; MG/100ML
INJECTION, SOLUTION INTRAVENOUS CONTINUOUS
Status: DISCONTINUED | OUTPATIENT
Start: 2020-11-12 | End: 2020-11-16

## 2020-11-12 RX ORDER — FENTANYL CITRATE 50 UG/ML
25 INJECTION, SOLUTION INTRAMUSCULAR; INTRAVENOUS EVERY 5 MIN PRN
Status: DISCONTINUED | OUTPATIENT
Start: 2020-11-12 | End: 2020-11-12 | Stop reason: HOSPADM

## 2020-11-12 RX ORDER — HYDROMORPHONE HYDROCHLORIDE 2 MG/ML
1 INJECTION, SOLUTION INTRAMUSCULAR; INTRAVENOUS; SUBCUTANEOUS
Status: DISCONTINUED | OUTPATIENT
Start: 2020-11-12 | End: 2020-11-13

## 2020-11-12 RX ORDER — HYDROCODONE BITARTRATE AND ACETAMINOPHEN 10; 325 MG/1; MG/1
1 TABLET ORAL EVERY 4 HOURS PRN
Status: DISCONTINUED | OUTPATIENT
Start: 2020-11-12 | End: 2020-11-13

## 2020-11-12 RX ORDER — BACLOFEN 10 MG/1
10 TABLET ORAL 3 TIMES DAILY
Status: DISCONTINUED | OUTPATIENT
Start: 2020-11-12 | End: 2020-11-13

## 2020-11-12 RX ORDER — SODIUM CHLORIDE 0.9 % (FLUSH) 0.9 %
3 SYRINGE (ML) INJECTION
Status: DISCONTINUED | OUTPATIENT
Start: 2020-11-12 | End: 2020-11-12

## 2020-11-12 RX ORDER — DONEPEZIL HYDROCHLORIDE 10 MG/1
10 TABLET, FILM COATED ORAL NIGHTLY
Status: DISCONTINUED | OUTPATIENT
Start: 2020-11-12 | End: 2020-11-18 | Stop reason: HOSPADM

## 2020-11-12 RX ORDER — FENTANYL CITRATE 50 UG/ML
INJECTION, SOLUTION INTRAMUSCULAR; INTRAVENOUS
Status: DISCONTINUED | OUTPATIENT
Start: 2020-11-12 | End: 2020-11-12

## 2020-11-12 RX ADMIN — ROCURONIUM BROMIDE 10 MG: 10 INJECTION, SOLUTION INTRAVENOUS at 11:11

## 2020-11-12 RX ADMIN — GABAPENTIN 300 MG: 300 CAPSULE ORAL at 03:11

## 2020-11-12 RX ADMIN — ROCURONIUM BROMIDE 40 MG: 10 INJECTION, SOLUTION INTRAVENOUS at 10:11

## 2020-11-12 RX ADMIN — ROPINIROLE HYDROCHLORIDE 0.25 MG: 0.25 TABLET, FILM COATED ORAL at 08:11

## 2020-11-12 RX ADMIN — SODIUM CHLORIDE, SODIUM LACTATE, POTASSIUM CHLORIDE, AND CALCIUM CHLORIDE: .6; .31; .03; .02 INJECTION, SOLUTION INTRAVENOUS at 03:11

## 2020-11-12 RX ADMIN — FENTANYL CITRATE 50 MCG: 50 INJECTION INTRAMUSCULAR; INTRAVENOUS at 10:11

## 2020-11-12 RX ADMIN — FAMOTIDINE 20 MG: 20 TABLET, FILM COATED ORAL at 03:11

## 2020-11-12 RX ADMIN — NEOSTIGMINE METHYLSULFATE 5 MG: 1 INJECTION INTRAVENOUS at 12:11

## 2020-11-12 RX ADMIN — SODIUM CHLORIDE, SODIUM LACTATE, POTASSIUM CHLORIDE, AND CALCIUM CHLORIDE: .6; .31; .03; .02 INJECTION, SOLUTION INTRAVENOUS at 08:11

## 2020-11-12 RX ADMIN — HYDROMORPHONE HYDROCHLORIDE 1 MG: 2 INJECTION INTRAMUSCULAR; INTRAVENOUS; SUBCUTANEOUS at 09:11

## 2020-11-12 RX ADMIN — ALPRAZOLAM 1 MG: 0.5 TABLET ORAL at 08:11

## 2020-11-12 RX ADMIN — HYDROMORPHONE HYDROCHLORIDE 0.2 MG: 2 INJECTION INTRAMUSCULAR; INTRAVENOUS; SUBCUTANEOUS at 12:11

## 2020-11-12 RX ADMIN — HYDROCODONE BITARTRATE AND ACETAMINOPHEN 1 TABLET: 10; 325 TABLET ORAL at 05:11

## 2020-11-12 RX ADMIN — FENTANYL CITRATE 25 MCG: 50 INJECTION INTRAMUSCULAR; INTRAVENOUS at 12:11

## 2020-11-12 RX ADMIN — PROPOFOL 50 MG: 10 INJECTION, EMULSION INTRAVENOUS at 10:11

## 2020-11-12 RX ADMIN — DONEPEZIL HYDROCHLORIDE 10 MG: 10 TABLET, FILM COATED ORAL at 08:11

## 2020-11-12 RX ADMIN — FENTANYL CITRATE 50 MCG: 50 INJECTION INTRAMUSCULAR; INTRAVENOUS at 12:11

## 2020-11-12 RX ADMIN — PHENYLEPHRINE HYDROCHLORIDE 100 MCG: 10 INJECTION INTRAVENOUS at 10:11

## 2020-11-12 RX ADMIN — Medication 100 MG: at 10:11

## 2020-11-12 RX ADMIN — HYDROMORPHONE HYDROCHLORIDE 0.2 MG: 2 INJECTION INTRAMUSCULAR; INTRAVENOUS; SUBCUTANEOUS at 01:11

## 2020-11-12 RX ADMIN — SODIUM CHLORIDE, SODIUM LACTATE, POTASSIUM CHLORIDE, AND CALCIUM CHLORIDE: .6; .31; .03; .02 INJECTION, SOLUTION INTRAVENOUS at 11:11

## 2020-11-12 RX ADMIN — LISINOPRIL 40 MG: 20 TABLET ORAL at 03:11

## 2020-11-12 RX ADMIN — BACLOFEN 10 MG: 10 TABLET ORAL at 08:11

## 2020-11-12 RX ADMIN — ERTAPENEM 1 G: 1 INJECTION INTRAMUSCULAR; INTRAVENOUS at 10:11

## 2020-11-12 RX ADMIN — GLYCOPYRROLATE 0.2 MG: 0.2 INJECTION, SOLUTION INTRAMUSCULAR; INTRAVITREAL at 10:11

## 2020-11-12 RX ADMIN — ACETAMINOPHEN 1000 MG: 10 INJECTION, SOLUTION INTRAVENOUS at 12:11

## 2020-11-12 RX ADMIN — MEMANTINE HYDROCHLORIDE 5 MG: 5 TABLET ORAL at 03:11

## 2020-11-12 RX ADMIN — GLYCOPYRROLATE 0.6 MG: 0.2 INJECTION, SOLUTION INTRAMUSCULAR; INTRAVITREAL at 12:11

## 2020-11-12 RX ADMIN — ONDANSETRON 4 MG: 2 INJECTION, SOLUTION INTRAMUSCULAR; INTRAVENOUS at 12:11

## 2020-11-12 RX ADMIN — PROPOFOL 20 MG: 10 INJECTION, EMULSION INTRAVENOUS at 10:11

## 2020-11-12 NOTE — OP NOTE
[facility]  Brief Operative Note    SUMMARY     Surgery Date: 11/12/2020     Surgeon(s) and Role:  Panel 1:     * Ranjit Ott III, MD - Primary     * Chapo Garcia MD - Assisting  Panel 2:     * Toni Nguyen MD - Primary        Pre-op Diagnosis:  Malignant neoplasm of cecum [C18.0]    Post-op Diagnosis:  Post-Op Diagnosis Codes:     * Malignant neoplasm of cecum [C18.0]    Procedure(s) (LRB):  COLECTOMY, RIGHT, LAPAROSCOPIC (Right)  CYSTOSCOPY, WITH RETROGRADE PYELOGRAM AND URETERAL STENT INSERTION (Bilateral)    Anesthesia: General    Description of Procedure:  80-year-old female with a malignant mass in the cecum consented for right hemicolectomy.  Patient had bilateral ureteral stents placed preoperatively  Patient brought into the operative room.  Placed in the operative supine position.  The abdomen was prepped draped sterile fashion.  A 7 cm incision was made in the midline at and just above the umbilicus.  Electrocautery was used to dissect through the subcutaneous tissue down to the fascia which was opened the midline.  There were no surrounding adhesions.  The GelPort was inserted.  On manual inspection was carried out throughout the abdomen that was a mass in the cecum as anticipated.  The liver was smooth and healthy.  There was no sign of carcinomatosis.  Using my hand to protect the underlying bowel and a 10 mm trocar was placed subxiphoid and 5 mm trocar in the left upper quadrant.  Pneumoperitoneum was instituted.  The lap scope was inserted.  The ink was at the mass in the cecum.  The cecum was adherent to the anterior abdominal wall.  There was some omentum of the transverse colon adherent to this area as well.  Several cm proximal to it through normal-appearing the omentum the LigaSure or was used to transect this to allow all of mobilization and resection to proceed.  Next the plane between the transverse colon and the stomach was started.  It was extended  to the patient's right laterally with the LigaSure device.  The gallbladder and duodenum were identified and kept out of harm's way.  This dissection was carried around the hepatic flexure and down the ascending colon.  The right ureter was palpated and checked repeatedly and kept out of harm's way procedure resection and anastomosis.  The mass in the cecum was adhered to the anterior abdominal wall.  This was uncertain if this was from her previous appendectomy or from a malignant extension.  It was  from the abdominal wall excising some of the peritoneum in a Eagle about 3 cm wide.  The margins of this were marked with clips in case it was a positive margin.  Dissection was carried around the cecum in the ascending and proximal transverse colon were completely mobilized.  The retroperitoneum was inspected was found to be hemostatic.  The ureter had been kept out of harm's way of the duodenum nor gallbladder were damaged.  Pneumoperitoneum was released.  This right colon was exteriorized through the GelPort.  The ileum was transected 10 cm proximal to the ileocecal bowel within DAVID stapling device with a blue load.  Likewise the transverse colon was resected just to the right of the middle colic vessel.  Next using the LigaSure the mesentery was resected at its base.  Care was taken not to damage the ureter or the duodenum.  The mass was obviously when the cecum this was marked with the ink.  Before proceeding with resection the remainder of the abdomen was inspected there were no other masses identified.  There were no significant adhesions from previous surgery.  A side-to-side anastomosis was created by aligning the end of the ileum and the transverse colon next to each other.  Both ends were well perfused and there was no kinking or twisting of the bowel.  A small enterotomy was made in each adjacent to the original staple line.  A DAVID 75 stapling device with blue load had each limb placed and 1 lumen  of the bowel.  It was closed toed wound and fired and withdrawn.  The staple line was intact and there was no bleeding.  The common enterotomy into previous staple lines were resected with the DAVID 100 stapling device with a green load.  For doing this 3 also consisting stitch was placed.  The specimen was sent as product of staple anastomosis.  The anastomosis was about 4 cm in length.  Gastrointestinal contents easily milked across it with no leakage from any of the staple lines.  The DAVID a 100 staple line was intact and not bleeding.  It was oversewn with 3 0 silk figure-of-eight sutures.  A place of adjacent omentum was then laid over the anastomosis and secured in place with interrupted 3 0 silk sutures.  There was no spillage of enteric contents.  This piece of bowel was irrigated with normal saline was inspected found to be hemostatic and was reduced back into the abdomen.  We changed gloves.  The abdomen was again inspected found to be hemostatic.  There were no enterotomies.  The right retroperitoneal area was adequately examined up through the incision for the GelPort.  The trocars were removed as well as GelPort.  The midline fascia was closed with interrupted 1.  Vicryl sutures.  The wounds irrigated with normal saline in the Pan closed with staples.  10 cc of 0.25% Marcaine with epinephrine were injected for local anesthesia.  Patient tolerated the procedure well.  End of dictation    Description of the findings of the procedure:  Cecal mass    Estimated Blood Loss: 50 mL         Specimens:   Specimen (12h ago, onward)    None

## 2020-11-12 NOTE — H&P
Surgery history and physical  80-year-old female found to be a low cardiac risk by her cardiologist had recently been developing some right lower quadrant abdominal pain CT scan of the abdomen revealed a mass.  Colonoscopy revealed a malignant mass in the area likely an adenocarcinoma of the colon but not standing typically-it does not appear to be a lymphoma.  She has good oral intake and regular brown bowel movements.  Past medical history hypertension anxiety shortness of breath diverticulitis gastroesophageal reflux disease, visually impaired  Past surgical history  x2, orthopedic procedures on left ankle left knee left shoulder left hip, appendectomy, hysterectomy  No known drug allergies  Medications see list  Social history patient does not smoke or consume alcoholic beverages  General alert and oriented x4  HEENT no cervical or supraclavicular masses  Lungs good breath sounds bilaterally  Cardiovascular regular rate and rhythm  Axilla no masses bilaterally  Abdomen soft, positive bowel sounds, nondistended, no masses, healed lower midline incision and right lower quadrant incision, no hernia  Assessment /plan malignant cecal mass-plan to proceed with right hemicolectomy, attempt laparoscopically today, ureteral stents to be placed by urologist, procedure risks situation again discussed with patient and family member with patient's permission, questions answered, they wish to proceed

## 2020-11-12 NOTE — ANESTHESIA PROCEDURE NOTES
Intubation  Performed by: Oscar Barber CRNA  Authorized by: Ricardo Ordonez MD     Intubation:     Induction:  Intravenous    Intubated:  Postinduction    Mask Ventilation:  Easy mask    Attempts:  2    Attempted By:  CRNA    Method of Intubation:  Direct    Blade:  Kory 3    Laryngeal View Grade: Grade III - only epiglottis visible      Attempted By (2nd Attempt):  CRNA    Method of Intubation (2nd Attempt):  Video laryngoscopy    Blade (2nd Attempt):  Glidescope 3    Laryngeal View Grade (2nd Attempt): Grade I - full view of cords      Difficult Airway Encountered?: No      Complications:  None    Airway Device:  Oral endotracheal tube    Airway Device Size:  7.0    Style/Cuff Inflation:  Cuffed (inflated to minimal occlusive pressure)    Tube secured:  21    Secured at:  The lips    Placement Verified By:  Capnometry    Complicating Factors:  Small mouth, large prominent central incisors and anterior larynx    Findings Post-Intubation:  BS equal bilateral and atraumatic/condition of teeth unchanged

## 2020-11-12 NOTE — OP NOTE
Surgery Date: 11/12/2020    Preoperative Dx:colon cancer    Postoperative Dx: colon cancer    Surgeon: Toni Nguyen MD    Anesthesia: General    Procedure: cystourethroscopy, bilateral retrograde pyelogram, bilateral ureteral catheter placement    Indication: 80 year old woman with cecal mass concerning for colon cancer, urology consulted to place ureteral catheters for ureteral identification during the case.    Estimated Blood Loss: < 2cc         Specimens Removed:   Specimen (12h ago, onward)    None          Drains:bilateral 5 fr ureteral catheters, 16 fr young catheter    Procedure details: After informed consent was obtained the patient was taken to the cystoscopy suite and placed in supine position.    After induction of general anesthesia. Patient was positioned in the dorsal lithotomy position. The genitalia was prepped and draped  in the usual sterile fashion. After time out was performed procedure commenced.  A 22 fr cystourethroscope was placed into the urethra and passed into the bladder visualizing the urethra along its entire course. There was no evidence of urethral stricture. The dome, anterior, posterior and lateral walls of the bladder were examined. The ureteral orifices were in their usual position, a 0.035 zip wire was used to intubate the right ureteral orifice, this was passed up to the right kidney, a 5 fr open ended ureteral catheter was passed up to the distal ureter, a retrograde pyelogram was performed which did not reveal filling defects. The catheter was placed up to the level of the renal pelvis, the wire and the scope were removed. The procedure was repeated on the left side, a retrograde pyelogram was performed and a 5 fr open ended ureteral catheer was placed in the left renal pelvis, no filling defects were visualized. A 16 fr young cathter was placed, secured with 10 cc of sterile water. The ureteral catheters were secured to drainage bags. At that time case was surrendered  to Dr. Ott, with plans for the stents to be removed if there is no concern for ureteral injury.

## 2020-11-12 NOTE — PROGRESS NOTES
Externalized stents confirmed to have been removed  Urine noted to be light pink anticipated after procedure will resolve    Urology to sign off at this time  Please call with any questions

## 2020-11-12 NOTE — TRANSFER OF CARE
Anesthesia Transfer of Care Note    Patient: Annette Fallon    Procedure(s) Performed: Procedure(s) (LRB):  COLECTOMY, RIGHT, LAPAROSCOPIC (Right)  CYSTOSCOPY, WITH RETROGRADE PYELOGRAM AND URETERAL STENT INSERTION (Bilateral)    Patient location: PACU    Anesthesia Type: general    Transport from OR: Transported from OR on room air with adequate spontaneous ventilation    Post pain: adequate analgesia    Post assessment: no apparent anesthetic complications and tolerated procedure well    Post vital signs: stable    Level of consciousness: sedated and responds to stimulation    Nausea/Vomiting: no nausea/vomiting    Complications: none    Transfer of care protocol was followed      Last vitals:   Visit Vitals  BP (!) 179/76 (BP Location: Right arm)   Pulse 101   Temp 36.3 °C (97.3 °F) (Skin)   Resp 17   Wt 72 kg (158 lb 11.7 oz)   SpO2 100%   Breastfeeding No   BMI 25.62 kg/m²

## 2020-11-13 LAB
ANION GAP SERPL CALC-SCNC: 11 MMOL/L (ref 8–16)
BASOPHILS # BLD AUTO: 0.02 K/UL (ref 0–0.2)
BASOPHILS NFR BLD: 0.4 % (ref 0–1.9)
BUN SERPL-MCNC: 5 MG/DL (ref 8–23)
CALCIUM SERPL-MCNC: 8.5 MG/DL (ref 8.7–10.5)
CHLORIDE SERPL-SCNC: 105 MMOL/L (ref 95–110)
CO2 SERPL-SCNC: 27 MMOL/L (ref 23–29)
CREAT SERPL-MCNC: 0.6 MG/DL (ref 0.5–1.4)
DIFFERENTIAL METHOD: ABNORMAL
EOSINOPHIL # BLD AUTO: 0.1 K/UL (ref 0–0.5)
EOSINOPHIL NFR BLD: 1 % (ref 0–8)
ERYTHROCYTE [DISTWIDTH] IN BLOOD BY AUTOMATED COUNT: 15.9 % (ref 11.5–14.5)
EST. GFR  (AFRICAN AMERICAN): >60 ML/MIN/1.73 M^2
EST. GFR  (NON AFRICAN AMERICAN): >60 ML/MIN/1.73 M^2
GLUCOSE SERPL-MCNC: 106 MG/DL (ref 70–110)
HCT VFR BLD AUTO: 30.8 % (ref 37–48.5)
HGB BLD-MCNC: 10 G/DL (ref 12–16)
IMM GRANULOCYTES # BLD AUTO: 0.02 K/UL (ref 0–0.04)
IMM GRANULOCYTES NFR BLD AUTO: 0.4 % (ref 0–0.5)
LYMPHOCYTES # BLD AUTO: 0.5 K/UL (ref 1–4.8)
LYMPHOCYTES NFR BLD: 11 % (ref 18–48)
MCH RBC QN AUTO: 27.2 PG (ref 27–31)
MCHC RBC AUTO-ENTMCNC: 32.5 G/DL (ref 32–36)
MCV RBC AUTO: 84 FL (ref 82–98)
MONOCYTES # BLD AUTO: 0.5 K/UL (ref 0.3–1)
MONOCYTES NFR BLD: 9.1 % (ref 4–15)
NEUTROPHILS # BLD AUTO: 3.8 K/UL (ref 1.8–7.7)
NEUTROPHILS NFR BLD: 78.1 % (ref 38–73)
NRBC BLD-RTO: 0 /100 WBC
PLATELET # BLD AUTO: 202 K/UL (ref 150–350)
PMV BLD AUTO: 10.4 FL (ref 9.2–12.9)
POTASSIUM SERPL-SCNC: 3.8 MMOL/L (ref 3.5–5.1)
RBC # BLD AUTO: 3.67 M/UL (ref 4–5.4)
SODIUM SERPL-SCNC: 143 MMOL/L (ref 136–145)
WBC # BLD AUTO: 4.92 K/UL (ref 3.9–12.7)

## 2020-11-13 PROCEDURE — 25000003 PHARM REV CODE 250: Performed by: SURGERY

## 2020-11-13 PROCEDURE — 36415 COLL VENOUS BLD VENIPUNCTURE: CPT

## 2020-11-13 PROCEDURE — 97530 THERAPEUTIC ACTIVITIES: CPT

## 2020-11-13 PROCEDURE — 80048 BASIC METABOLIC PNL TOTAL CA: CPT

## 2020-11-13 PROCEDURE — 99900035 HC TECH TIME PER 15 MIN (STAT)

## 2020-11-13 PROCEDURE — 85025 COMPLETE CBC W/AUTO DIFF WBC: CPT

## 2020-11-13 PROCEDURE — 63600175 PHARM REV CODE 636 W HCPCS: Performed by: SURGERY

## 2020-11-13 PROCEDURE — 11000001 HC ACUTE MED/SURG PRIVATE ROOM

## 2020-11-13 PROCEDURE — 97161 PT EVAL LOW COMPLEX 20 MIN: CPT

## 2020-11-13 PROCEDURE — 27000221 HC OXYGEN, UP TO 24 HOURS

## 2020-11-13 RX ORDER — HYDROMORPHONE HYDROCHLORIDE 2 MG/ML
0.5 INJECTION, SOLUTION INTRAMUSCULAR; INTRAVENOUS; SUBCUTANEOUS EVERY 6 HOURS PRN
Status: DISCONTINUED | OUTPATIENT
Start: 2020-11-13 | End: 2020-11-18 | Stop reason: HOSPADM

## 2020-11-13 RX ORDER — HYDROMORPHONE HYDROCHLORIDE 2 MG/ML
1 INJECTION, SOLUTION INTRAMUSCULAR; INTRAVENOUS; SUBCUTANEOUS EVERY 4 HOURS PRN
Status: DISCONTINUED | OUTPATIENT
Start: 2020-11-13 | End: 2020-11-13

## 2020-11-13 RX ORDER — MUPIROCIN 20 MG/G
OINTMENT TOPICAL 2 TIMES DAILY
Status: COMPLETED | OUTPATIENT
Start: 2020-11-13 | End: 2020-11-18

## 2020-11-13 RX ORDER — HYDROCODONE BITARTRATE AND ACETAMINOPHEN 10; 325 MG/1; MG/1
1 TABLET ORAL EVERY 6 HOURS PRN
Status: DISCONTINUED | OUTPATIENT
Start: 2020-11-13 | End: 2020-11-18 | Stop reason: HOSPADM

## 2020-11-13 RX ORDER — GABAPENTIN 300 MG/1
600 CAPSULE ORAL
Status: DISCONTINUED | OUTPATIENT
Start: 2020-11-13 | End: 2020-11-18 | Stop reason: HOSPADM

## 2020-11-13 RX ORDER — GABAPENTIN 300 MG/1
300 CAPSULE ORAL 2 TIMES DAILY
Status: DISCONTINUED | OUTPATIENT
Start: 2020-11-13 | End: 2020-11-18 | Stop reason: HOSPADM

## 2020-11-13 RX ORDER — HYDROMORPHONE HYDROCHLORIDE 2 MG/ML
0.5 INJECTION, SOLUTION INTRAMUSCULAR; INTRAVENOUS; SUBCUTANEOUS EVERY 4 HOURS PRN
Status: DISCONTINUED | OUTPATIENT
Start: 2020-11-13 | End: 2020-11-13

## 2020-11-13 RX ADMIN — HYDROMORPHONE HYDROCHLORIDE 1 MG: 2 INJECTION INTRAMUSCULAR; INTRAVENOUS; SUBCUTANEOUS at 05:11

## 2020-11-13 RX ADMIN — ERTAPENEM 1 G: 1 INJECTION INTRAMUSCULAR; INTRAVENOUS at 08:11

## 2020-11-13 RX ADMIN — HYDROMORPHONE HYDROCHLORIDE 0.5 MG: 2 INJECTION INTRAMUSCULAR; INTRAVENOUS; SUBCUTANEOUS at 09:11

## 2020-11-13 RX ADMIN — BACLOFEN 10 MG: 10 TABLET ORAL at 08:11

## 2020-11-13 RX ADMIN — MUPIROCIN: 20 OINTMENT TOPICAL at 09:11

## 2020-11-13 RX ADMIN — HYDROCODONE BITARTRATE AND ACETAMINOPHEN 1 TABLET: 10; 325 TABLET ORAL at 01:11

## 2020-11-13 RX ADMIN — ROPINIROLE HYDROCHLORIDE 0.25 MG: 0.25 TABLET, FILM COATED ORAL at 08:11

## 2020-11-13 RX ADMIN — ROPINIROLE HYDROCHLORIDE 0.25 MG: 0.25 TABLET, FILM COATED ORAL at 09:11

## 2020-11-13 RX ADMIN — LISINOPRIL 40 MG: 20 TABLET ORAL at 08:11

## 2020-11-13 RX ADMIN — ENOXAPARIN SODIUM 40 MG: 40 INJECTION SUBCUTANEOUS at 05:11

## 2020-11-13 RX ADMIN — GABAPENTIN 300 MG: 300 CAPSULE ORAL at 08:11

## 2020-11-13 RX ADMIN — AMLODIPINE BESYLATE 5 MG: 5 TABLET ORAL at 08:11

## 2020-11-13 RX ADMIN — FAMOTIDINE 20 MG: 20 TABLET, FILM COATED ORAL at 08:11

## 2020-11-13 RX ADMIN — DONEPEZIL HYDROCHLORIDE 10 MG: 10 TABLET, FILM COATED ORAL at 09:11

## 2020-11-13 RX ADMIN — ALPRAZOLAM 1 MG: 0.5 TABLET ORAL at 08:11

## 2020-11-13 RX ADMIN — MEMANTINE HYDROCHLORIDE 5 MG: 5 TABLET ORAL at 08:11

## 2020-11-13 RX ADMIN — HYDROMORPHONE HYDROCHLORIDE 1 MG: 2 INJECTION INTRAMUSCULAR; INTRAVENOUS; SUBCUTANEOUS at 08:11

## 2020-11-13 NOTE — PROGRESS NOTES
Pharmacist Renal Dose Adjustment Note-    Annette Fallon is a 80 y.o. female being treated with the medication famotidine    Patient Data:    Vital Signs (Most Recent):  Temp: 98.8 °F (37.1 °C) (11/13/20 0755)  Pulse: 79 (11/13/20 0755)  Resp: 16 (11/13/20 0804)  BP: (!) 116/56 (11/13/20 0755)  SpO2: 99 % (11/13/20 0755)   Vital Signs (72h Range):  Temp:  [97.3 °F (36.3 °C)-98.8 °F (37.1 °C)]   Pulse:  []   Resp:  [13-22]   BP: (111-179)/(56-76)   SpO2:  [91 %-100 %]      Recent Labs   Lab 11/09/20  1525 11/13/20  0602   CREATININE 1.3 0.6     Serum creatinine: 0.6 mg/dL 11/13/20 0602  Estimated creatinine clearance: 74.9 mL/min    Medication:famotidine dose: 20 mg  frequency q 24 hrs will be changed to medication:famotidine dose:40 mg frequency:q 24 hrs    Pharmacist's Name: Shahrzad Francois  Pharmacist's Extension: [phone]

## 2020-11-13 NOTE — PROGRESS NOTES
"gen surg pod 1  Arousable,sleepy, answers questions  Spoke --xanax was listed as home med--he says she is not on xanax--this med dcd by me few hrs ago  Blood pressure 127/60, pulse 75, temperature 98 °F (36.7 °C), temperature source Axillary, resp. rate 13, height 5' 6" (1.676 m), weight 72.3 kg (159 lb 6.3 oz), SpO2 98 %, not currently breastfeeding.  abd soft, nd, dressings dry, appropriate inc tenderness  A/p s/p lap R colecotmy for malignancy- decreased sedative medicatons, this should make her more awake, situation d/w  at length-op findings discussed-no family post op yest- when more awake should inceease po intake and allow to participate in PT  "

## 2020-11-13 NOTE — PLAN OF CARE
Patient sleeping and could not be aroused by SW to complete discharge planning assessment. SW utilized medical record to complete assessment.        11/13/20 5105   Discharge Assessment   Assessment Type Discharge Planning Assessment   Assessment information obtained from? Medical Record   Prior to hospitilization cognitive status: Alert/Oriented   Prior to hospitalization functional status: Needs Assistance   Current cognitive status: Alert/Oriented   Current Functional Status: Needs Assistance   Facility Arrived From: Home   Lives With spouse   Able to Return to Prior Arrangements yes   Is patient able to care for self after discharge? Yes   Patient's perception of discharge disposition home or selfcare   Readmission Within the Last 30 Days no previous admission in last 30 days   Patient currently being followed by outpatient case management? No   Patient currently receives any other outside agency services? No   Equipment Currently Used at Home none   Do you have any problems affording any of your prescribed medications? No   Is the patient taking medications as prescribed? yes   Does the patient have transportation home? Yes   Transportation Anticipated family or friend will provide   Dialysis Name and Scheduled days N/A   Does the patient receive services at the [facility]? No   Discharge Plan A Home   Discharge Plan B Home   DME Needed Upon Discharge  none   Patient/Family in Agreement with Plan yes

## 2020-11-13 NOTE — NURSING
Pt very lethargic this shift, scheduled am meds given but pt sleeping most of shift, pt worked w/PT but was drowsy then as well, bed alarm audible, safety measures in place, AVAsys maintained

## 2020-11-13 NOTE — PLAN OF CARE
Problem: Adult Inpatient Plan of Care  Goal: Plan of Care Review  Outcome: Ongoing, Progressing  Flowsheets (Taken 11/13/2020 0508)  Plan of Care Reviewed With: patient     Pt free from falls, injury or any further trauma throughout shift. Pt AAO x 4. Continued medications as ordered. Complaints of pain, moderately controlled with medications. ABD dressing intact with shadowing noted. Mackey intact with pink urine noted. Pt in no distress. Will cont to monitor.

## 2020-11-13 NOTE — PROGRESS NOTES
Surgery Progress Note    Primary Problem: Cancer of cecum    Other problems:   Active Hospital Problems    Diagnosis  POA    *Cancer of cecum [C18.0]  Yes      Resolved Hospital Problems   No resolved problems to display.       HD #  LOS: 1 day   POD #1 Day Post-Op status post laparoscopic right colectomy    Overnight events:  Complains of some right-sided pain.  No nausea vomiting.  No flatus or bowel movement    Diet - Diet clear liquid     I/O last 3 completed shifts:  In: 1500 [I.V.:1500]  Out: 450 [Urine:400; Blood:50]    Intake/Output Summary (Last 24 hours) at 11/13/2020 0659  Last data filed at 11/13/2020 0615  Gross per 24 hour   Intake 1500 ml   Output 1300 ml   Net 200 ml       Temp:  [97.3 °F (36.3 °C)-98.8 °F (37.1 °C)] 98.8 °F (37.1 °C)  Pulse:  [] 82  Resp:  [13-22] 15  SpO2:  [91 %-100 %] 95 %  BP: (111-179)/(59-76) 111/63    Gen: alert, well appearing, and in no distress,    Abdomen soft appropriately tender to palpation.  OR incisions without saturation.  Mild tenderness right lower quadrant.  No rebound or guarding    Recent Labs   Lab 11/09/20  1525   WBC 8.32   HGB 11.1*   HCT 33.7*         K 4.4      BUN 20   *   PROT 6.5   ALBUMIN 3.2*   BILITOT 0.3   AST 13   ALKPHOS 100   ALT 7*        Assessment:  Status post laparoscopic right colectomy    Plan:  Clear liquid diet  Incentive spirometer  Ambulating halls, of bed to chair  D/C hannah Garcia MD

## 2020-11-13 NOTE — NURSING
Placed call to MD to make aware of pt being drowsy most of shift, VSS, bed alarm audible, safety measures remain in place, AVAsys maintained

## 2020-11-13 NOTE — PLAN OF CARE
Problem: Physical Therapy Goal  Goal: Physical Therapy Goal  Description: Goals to be met by:      Patient will increase functional independence with mobility by performin. Supine to sit with Modified [place]  2. Sit to supine with Modified [place]  3. Sit to stand transfer with Modified [place]  4. Bed to chair transfer with Modified [place]   5. Gait  x 150 feet with Modified [place] using least restrictive AD, given pt's blindness   6. Lower extremity exercise program x20 reps per handout, with supervision    Outcome: Ongoing, Progressing    6x/week.   SNF

## 2020-11-13 NOTE — NURSING
Dr. Garcia @ bedside. New orders given to d/c hannah, advance to clear liquids, modify gabapentin to how pt takes @ home. Pt states she takes 300 in the AM and nightly then 2 300 mg tab around lunch time. Pt in no distress. Will cont to monitor.

## 2020-11-13 NOTE — PT/OT/SLP EVAL
Physical Therapy Evaluation    Patient Name:  Annette Fallon   MRN:  [id number]    Recommendations:     Discharge Recommendations:  nursing facility, skilled   Discharge Equipment Recommendations: other (see comments)(TBD)   Barriers to discharge: None    Assessment:     Annette Fallon is a 80 y.o. female admitted with a medical diagnosis of Cancer of cecum.  She presents with the following impairments/functional limitations:  weakness, gait instability, decreased upper extremity function, decreased ROM, impaired cardiopulmonary response to activity, impaired endurance, impaired balance, decreased lower extremity function, visual deficits, impaired fine motor, impaired muscle length, impaired self care skills, impaired cognition, pain, impaired skin, impaired functional mobilty, decreased coordination, edema .    Pt somnolent. Pain dominant. Had not been OOB since preop. Needs max encouragement to mobilize. Able to stand with handheld assistance x 2 persons 2/2 blindness and fear.       Rehab Prognosis: Good; patient would benefit from acute skilled PT services to address these deficits and reach maximum level of function.    Recent Surgery: Procedure(s) (LRB):  COLECTOMY, RIGHT, LAPAROSCOPIC (Right)  CYSTOSCOPY, WITH RETROGRADE PYELOGRAM AND URETERAL STENT INSERTION (Bilateral) 1 Day Post-Op    Plan:     During this hospitalization, patient to be seen 6 x/week to address the identified rehab impairments via gait training, therapeutic activities, therapeutic exercises, neuromuscular re-education and progress toward the following goals:    · Plan of Care Expires:  11/27/20    Subjective     Chief Complaint: abd pain  Patient/Family Comments/goals: to return back to bed  Pain/Comfort:  · Pain Rating 1: 6/10  · Location 1: abdomen  · Pain Addressed 1: Nurse notified    Patients cultural, spiritual, Hinduism conflicts given the current situation: no    Living Environment:  Per pt she lives with spouse and 80-yr old  sister in law. Neither pt nor spouse drive but sister in law does. Per chart review h/o multiple falls.   Prior to admission, patients level of function was per pt she ambulates without AD, furniture walks prn.  Equipment used at home: (unknown).  DME owned (not currently used): unknown.  Upon discharge, patient will have assistance from ?.    Objective:     Communicated with SINAN Littlejohn prior to session.  Patient found slumped in bed with peripheral IV, oxygen, telemetry, SCD  upon PT entry to room.    General Precautions: Standard, fall, blind, respiratory   Orthopedic Precautions:N/A   Braces: N/A     Exams:  · on 2LO2. follows commands appropriately. [place], Blind B/L  · Cognitive Exam:  Patient is oriented to Person, Place, Time and Situation  · Gross Motor Coordination:  head/neck and UE intermittent tremors  · Postural Exam:  Patient presented with the following abnormalities:    · -       difficult to assess 2/2 slumped posture in sitting  · Sensation:    · -       Intact  · Skin Integrity/Edema:      · -       Skin integrity: midline abd surgical incision dressed.   · RLE ROM: WFL  · RLE Strength: WFL  · LLE ROM: WFL  · LLE Strength: WFL    Functional Mobility:  · Bed Mobility:     · Supine to Sit: moderate assistance  · Distressed mid transition 2/2 abd pain  · C/o severe dizziness which progressively improved   · Seated BP = 116/61; HR = 81  · Side stepped to HOB with modA via HHA x 2  · Sit to Supine: maximal assistance  · Transfers:     · Sit to Stand:  minimum assistance with hand-held assist  · Balance: fair + standing with HHA  · Loses balance posteriorly with UE elevation    Therapeutic Activities and Exercises:    mobility as above    AM-PAC 6 CLICK MOBILITY  Total Score:11     Patient left HOB elevated with call button in reach, bed alarm on and bed in chair mode.    GOALS:   Multidisciplinary Problems     Physical Therapy Goals        Problem: Physical Therapy Goal    Goal Priority Disciplines  Outcome Goal Variances Interventions   Physical Therapy Goal     PT, PT/OT Ongoing, Progressing     Description: Goals to be met by:      Patient will increase functional independence with mobility by performin. Supine to sit with Modified [place]  2. Sit to supine with Modified [place]  3. Sit to stand transfer with Modified [place]  4. Bed to chair transfer with Modified [place]   5. Gait  x 150 feet with Modified [place] using least restrictive AD, given pt's blindness   6. Lower extremity exercise program x20 reps per handout, with supervision                     History:     Past Medical History:   Diagnosis Date    Arthritis     Cancer     Depression     GERD (gastroesophageal reflux disease)     History of radiofrequency ablation procedure for cardiac arrhythmia     Hypertension     Memory loss     Osteoporosis     Palpitations     Sleep apnea        Past Surgical History:   Procedure Laterality Date    ANKLE SURGERY Left     APPENDECTOMY       SECTION      x2    CYSTOSCOPY WITH URETEROSCOPY, RETROGRADE PYELOGRAPHY, AND INSERTION OF STENT Bilateral 2020    Procedure: CYSTOSCOPY, WITH RETROGRADE PYELOGRAM AND URETERAL STENT INSERTION;  Surgeon: Toni Nguyen MD;  Location: [facility] OR;  Service: Urology;  Laterality: Bilateral;    EYE SURGERY Left     HIP SURGERY      HIP SURGERY Left     HYSTERECTOMY      LAPAROSCOPIC RIGHT COLON RESECTION Right 2020    Procedure: COLECTOMY, RIGHT, LAPAROSCOPIC;  Surgeon: Ranjit Ott III, MD;  Location: [facility] OR;  Service: General;  Laterality: Right;  COMBINED SURGERY WITH DR. NGUYEN  COVID NEGATIVE 20    ROTATOR CUFF REPAIR      L arm    TONSILLECTOMY         Time Tracking:     PT Received On: 20  PT Start Time: 1348     PT Stop Time: 1418  PT Total Time (min): 30 min     Billable Minutes: Evaluation 15 and Therapeutic Activity 15      Mahnaz Collado, PT  2020

## 2020-11-14 PROCEDURE — 63600175 PHARM REV CODE 636 W HCPCS: Performed by: SURGERY

## 2020-11-14 PROCEDURE — 97110 THERAPEUTIC EXERCISES: CPT | Mod: CQ

## 2020-11-14 PROCEDURE — 99900035 HC TECH TIME PER 15 MIN (STAT)

## 2020-11-14 PROCEDURE — 97116 GAIT TRAINING THERAPY: CPT | Mod: CQ

## 2020-11-14 PROCEDURE — 94799 UNLISTED PULMONARY SVC/PX: CPT

## 2020-11-14 PROCEDURE — 11000001 HC ACUTE MED/SURG PRIVATE ROOM

## 2020-11-14 PROCEDURE — 25000003 PHARM REV CODE 250: Performed by: SURGERY

## 2020-11-14 PROCEDURE — 94761 N-INVAS EAR/PLS OXIMETRY MLT: CPT

## 2020-11-14 RX ADMIN — FAMOTIDINE 20 MG: 20 TABLET, FILM COATED ORAL at 08:11

## 2020-11-14 RX ADMIN — SODIUM CHLORIDE, SODIUM LACTATE, POTASSIUM CHLORIDE, AND CALCIUM CHLORIDE: .6; .31; .03; .02 INJECTION, SOLUTION INTRAVENOUS at 06:11

## 2020-11-14 RX ADMIN — MUPIROCIN: 20 OINTMENT TOPICAL at 08:11

## 2020-11-14 RX ADMIN — GABAPENTIN 300 MG: 300 CAPSULE ORAL at 09:11

## 2020-11-14 RX ADMIN — ROPINIROLE HYDROCHLORIDE 0.25 MG: 0.25 TABLET, FILM COATED ORAL at 08:11

## 2020-11-14 RX ADMIN — MUPIROCIN: 20 OINTMENT TOPICAL at 09:11

## 2020-11-14 RX ADMIN — AMLODIPINE BESYLATE 5 MG: 5 TABLET ORAL at 08:11

## 2020-11-14 RX ADMIN — LISINOPRIL 40 MG: 20 TABLET ORAL at 08:11

## 2020-11-14 RX ADMIN — MEMANTINE HYDROCHLORIDE 5 MG: 5 TABLET ORAL at 08:11

## 2020-11-14 RX ADMIN — HYDROCODONE BITARTRATE AND ACETAMINOPHEN 1 TABLET: 10; 325 TABLET ORAL at 05:11

## 2020-11-14 RX ADMIN — SODIUM CHLORIDE, SODIUM LACTATE, POTASSIUM CHLORIDE, AND CALCIUM CHLORIDE: .6; .31; .03; .02 INJECTION, SOLUTION INTRAVENOUS at 09:11

## 2020-11-14 RX ADMIN — GABAPENTIN 600 MG: 300 CAPSULE ORAL at 12:11

## 2020-11-14 RX ADMIN — HYDROMORPHONE HYDROCHLORIDE 0.5 MG: 2 INJECTION INTRAMUSCULAR; INTRAVENOUS; SUBCUTANEOUS at 09:11

## 2020-11-14 RX ADMIN — ENOXAPARIN SODIUM 40 MG: 40 INJECTION SUBCUTANEOUS at 04:11

## 2020-11-14 RX ADMIN — SODIUM CHLORIDE, SODIUM LACTATE, POTASSIUM CHLORIDE, AND CALCIUM CHLORIDE 1000 ML: .6; .31; .03; .02 INJECTION, SOLUTION INTRAVENOUS at 08:11

## 2020-11-14 RX ADMIN — GABAPENTIN 300 MG: 300 CAPSULE ORAL at 08:11

## 2020-11-14 RX ADMIN — HYDROCODONE BITARTRATE AND ACETAMINOPHEN 1 TABLET: 10; 325 TABLET ORAL at 08:11

## 2020-11-14 NOTE — PLAN OF CARE
11/14/20 1128   Medicare Message   Important Message from Medicare regarding Discharge Appeal Rights Given to patient/caregiver;Explained to patient/caregiver;Signed/date by patient/caregiver   Date IMM was signed 11/14/20   Time IMM was signed 1109

## 2020-11-14 NOTE — PLAN OF CARE
Problem: Physical Therapy Goal  Goal: Physical Therapy Goal  Description: Goals to be met by:      Patient will increase functional independence with mobility by performin. Supine to sit with Modified [place]  2. Sit to supine with Modified [place]  3. Sit to stand transfer with Modified [place]  4. Bed to chair transfer with Modified [place]   5. Gait  x 150 feet with Modified [place] using least restrictive AD, given pt's blindness   6. Lower extremity exercise program x20 reps per handout, with supervision    Outcome: Ongoing, Progressing   Pt CGA  with sup to sit, sit to stand with rw with CGA.  PT AMB 80 ft with rw with CGA and min A with rw management 2/2 legally blind.  Pt needed vcs for direction.  Pt wwith decreased alexa, decreased step length and height, and increased time in double stance.  Pt alfonso tx well.

## 2020-11-14 NOTE — PROGRESS NOTES
Surgery/postoperative day 2    Patient resting well.  Urinating well without any issues no nausea no vomiting no flatus    Vital signs stable afebrile    Abdomen soft incision clean dry and intact positive hypoactive distant bowel sounds    Laboratory data noted    Assessment status post hand assisted right hemicolectomy patient clinically doing well    Plan needs aggressive physical therapy.  Awaiting resolution of expected postoperative ileus prior to initiation of full oral therapy

## 2020-11-14 NOTE — PT/OT/SLP PROGRESS
Physical Therapy Treatment    Patient Name:  Annette Fallon   MRN:  [id number]    Recommendations:     Discharge Recommendations:  nursing facility, skilled   Discharge Equipment Recommendations: other (see comments)(TBD)   Barriers to discharge: None    Assessment:     Annette Fallon is a 80 y.o. female admitted with a medical diagnosis of Cancer of cecum.  She presents with the following impairments/functional limitations:  weakness, impaired endurance, impaired self care skills, impaired functional mobilty, gait instability, impaired balance, decreased lower extremity function, decreased safety awareness, pain, impaired skin, edema. Pt CGA  with sup to sit, sit to stand with rw with CGA.  PT AMB 80 ft with rw with CGA and min A with rw management 2/2 legally blind.  Pt needed vcs for direction.  Pt wwith decreased alexa, decreased step length and height, and increased time in double stance.  Pt alfonso tx well.    Rehab Prognosis: Good; patient would benefit from acute skilled PT services to address these deficits and reach maximum level of function.    Recent Surgery: Procedure(s) (LRB):  COLECTOMY, RIGHT, LAPAROSCOPIC (Right)  CYSTOSCOPY, WITH RETROGRADE PYELOGRAM AND URETERAL STENT INSERTION (Bilateral) 2 Days Post-Op    Plan:     During this hospitalization, patient to be seen 6 x/week to address the identified rehab impairments via gait training, therapeutic activities, therapeutic exercises, neuromuscular re-education and progress toward the following goals:    · Plan of Care Expires:  11/27/20    Subjective     Chief Complaint: pain  Patient/Family Comments/goals: pt agreed to therapy  Pain/Comfort:  · Pain Rating 1: 5/10  · Location - Side 1: Bilateral  · Location - Orientation 1: midline  · Location 1: abdomen  · Pain Addressed 1: Pre-medicate for activity, Reposition, Distraction, Nurse notified  · Pain Rating Post-Intervention 1: 5/10      Objective:     Communicated with nurse prior to session.   Patient found HOB elevated with oxygen, peripheral IV upon PT entry to room.     General Precautions: Standard, fall, blind, respiratory   Orthopedic Precautions:N/A   Braces:       Functional Mobility:  · Bed Mobility:     · Scooting: contact guard assistance  · Bridging: modified independence  · Supine to Sit: contact guard assistance  · Sit to Supine: contact guard assistance  · Transfers:     · Sit to Stand:  contact guard assistance with rolling walker  · Gait: 80 ft with rww with CGA and min A with rww management and vcs for direction 2/2 to blind  · Balance: F/F+ standing with rww      AM-PAC 6 CLICK MOBILITY  Turning over in bed (including adjusting bedclothes, sheets and blankets)?: 4  Sitting down on and standing up from a chair with arms (e.g., wheelchair, bedside commode, etc.): 4  Moving from lying on back to sitting on the side of the bed?: 4  Moving to and from a bed to a chair (including a wheelchair)?: 3  Need to walk in hospital room?: 3  Climbing 3-5 steps with a railing?: 3  Basic Mobility Total Score: 21       Therapeutic Activities and Exercises:   BLE TE seated x 10; LAQ, HIP FLEXION, HS, HR, TT, pillow squeezes, gs    Patient left HOB elevated with all lines intact, call button in reach, bed alarm on and nurse notified..    GOALS:   Multidisciplinary Problems     Physical Therapy Goals        Problem: Physical Therapy Goal    Goal Priority Disciplines Outcome Goal Variances Interventions   Physical Therapy Goal     PT, PT/OT Ongoing, Progressing     Description: Goals to be met by:      Patient will increase functional independence with mobility by performin. Supine to sit with Modified [place]  2. Sit to supine with Modified [place]  3. Sit to stand transfer with Modified [place]  4. Bed to chair transfer with Modified [place]   5. Gait  x 150 feet with Modified [place] using least restrictive AD, given pt's blindness   6. Lower extremity exercise program  x20 reps per handout, with supervision                     Time Tracking:     PT Received On: 11/14/20  PT Start Time: 1325     PT Stop Time: 1358  PT Total Time (min): 33 min     Billable Minutes: Gait Training 15 and Therapeutic Exercise 18    Treatment Type: Treatment  PT/PTA: PTA     PTA Visit Number: 1     Eitan Howe PTA  11/14/2020

## 2020-11-14 NOTE — PLAN OF CARE
Pt AAOx4, VSS on 2L NC, voids per BSC x 1 assist, Diet clear liquids, Scheduled meds administered, pain managed with prn meds, Telesitter at bedside, bed locked on lowest position, call light within reach, bed alarm on, Remains free of fall. Abd incision open to air, staples intact.    Problem: Adult Inpatient Plan of Care  Goal: Plan of Care Review  Outcome: Ongoing, Progressing  Flowsheets (Taken 11/14/2020 1650)  Plan of Care Reviewed With: patient  Goal: Patient-Specific Goal (Individualization)  Outcome: Ongoing, Progressing  Goal: Absence of Hospital-Acquired Illness or Injury  Outcome: Ongoing, Progressing  Intervention: Identify and Manage Fall Risk  Flowsheets (Taken 11/14/2020 1650)  Safety Promotion/Fall Prevention:   assistive device/personal item within reach   bed alarm set   instructed to call staff for mobility   side rails raised x 3   room near unit station   /camera at bedside   Fall Risk reviewed with patient/family   medications reviewed   commode/urinal/bedpan at bedside  Intervention: Prevent VTE (venous thromboembolism)  Flowsheets (Taken 11/14/2020 1650)  VTE Prevention/Management: bleeding precautions maintained  Goal: Optimal Comfort and Wellbeing  Outcome: Ongoing, Progressing  Intervention: Provide Person-Centered Care  Flowsheets (Taken 11/14/2020 1650)  Trust Relationship/Rapport: care explained  Goal: Readiness for Transition of Care  Outcome: Ongoing, Progressing  Goal: Rounds/Family Conference  Outcome: Ongoing, Progressing     Problem: Fall Injury Risk  Goal: Absence of Fall and Fall-Related Injury  Outcome: Ongoing, Progressing  Intervention: Identify and Manage Contributors to Fall Injury Risk  Flowsheets (Taken 11/14/2020 1650)  Self-Care Promotion:   independence encouraged   BADL personal objects within reach   BADL personal routines maintained  Medication Review/Management: medications reviewed  Intervention: Promote Injury-Free  Environment  Flowsheets (Taken 11/14/2020 1650)  Safety Promotion/Fall Prevention:   assistive device/personal item within reach   bed alarm set   instructed to call staff for mobility   side rails raised x 3   room near unit station   /camera at bedside   Fall Risk reviewed with patient/family   medications reviewed   commode/urinal/bedpan at bedside  Environmental Safety Modification:   assistive device/personal items within reach   clutter free environment maintained     Problem: Infection  Goal: Infection Symptom Resolution  Outcome: Ongoing, Progressing  Intervention: Prevent or Manage Infection  Flowsheets (Taken 11/14/2020 1650)  Infection Management: aseptic technique maintained

## 2020-11-14 NOTE — PLAN OF CARE
Problem: Skin Injury Risk Increased  Goal: Skin Health and Integrity  Outcome: Ongoing, Progressing  Intervention: Optimize Skin Protection  Flowsheets (Taken 11/13/2020 1809)  Pressure Reduction Techniques:   frequent weight shift encouraged   weight shift assistance provided  Pressure Reduction Devices:   positioning supports utilized   heel offloading device utilized   pressure-redistributing mattress utilized  Intervention: Promote and Optimize Oral Intake  Flowsheets (Taken 11/13/2020 1809)  Oral Nutrition Promotion: physical activity promoted

## 2020-11-15 LAB
ANION GAP SERPL CALC-SCNC: 6 MMOL/L (ref 8–16)
BUN SERPL-MCNC: 2 MG/DL (ref 8–23)
CALCIUM SERPL-MCNC: 8.1 MG/DL (ref 8.7–10.5)
CHLORIDE SERPL-SCNC: 107 MMOL/L (ref 95–110)
CO2 SERPL-SCNC: 31 MMOL/L (ref 23–29)
CREAT SERPL-MCNC: 0.5 MG/DL (ref 0.5–1.4)
EST. GFR  (AFRICAN AMERICAN): >60 ML/MIN/1.73 M^2
EST. GFR  (NON AFRICAN AMERICAN): >60 ML/MIN/1.73 M^2
GLUCOSE SERPL-MCNC: 86 MG/DL (ref 70–110)
POTASSIUM SERPL-SCNC: 3.6 MMOL/L (ref 3.5–5.1)
SODIUM SERPL-SCNC: 144 MMOL/L (ref 136–145)

## 2020-11-15 PROCEDURE — 80048 BASIC METABOLIC PNL TOTAL CA: CPT

## 2020-11-15 PROCEDURE — 25000003 PHARM REV CODE 250: Performed by: SURGERY

## 2020-11-15 PROCEDURE — 36415 COLL VENOUS BLD VENIPUNCTURE: CPT

## 2020-11-15 PROCEDURE — 11000001 HC ACUTE MED/SURG PRIVATE ROOM

## 2020-11-15 PROCEDURE — 63600175 PHARM REV CODE 636 W HCPCS: Performed by: SURGERY

## 2020-11-15 RX ADMIN — HYDROCODONE BITARTRATE AND ACETAMINOPHEN 1 TABLET: 10; 325 TABLET ORAL at 08:11

## 2020-11-15 RX ADMIN — MEMANTINE HYDROCHLORIDE 5 MG: 5 TABLET ORAL at 08:11

## 2020-11-15 RX ADMIN — ROPINIROLE HYDROCHLORIDE 0.25 MG: 0.25 TABLET, FILM COATED ORAL at 08:11

## 2020-11-15 RX ADMIN — MUPIROCIN: 20 OINTMENT TOPICAL at 08:11

## 2020-11-15 RX ADMIN — FAMOTIDINE 20 MG: 20 TABLET, FILM COATED ORAL at 08:11

## 2020-11-15 RX ADMIN — HYDROMORPHONE HYDROCHLORIDE 0.5 MG: 2 INJECTION INTRAMUSCULAR; INTRAVENOUS; SUBCUTANEOUS at 05:11

## 2020-11-15 RX ADMIN — GABAPENTIN 600 MG: 300 CAPSULE ORAL at 11:11

## 2020-11-15 RX ADMIN — GABAPENTIN 300 MG: 300 CAPSULE ORAL at 08:11

## 2020-11-15 RX ADMIN — DONEPEZIL HYDROCHLORIDE 10 MG: 10 TABLET, FILM COATED ORAL at 08:11

## 2020-11-15 RX ADMIN — LISINOPRIL 40 MG: 20 TABLET ORAL at 08:11

## 2020-11-15 RX ADMIN — AMLODIPINE BESYLATE 5 MG: 5 TABLET ORAL at 08:11

## 2020-11-15 RX ADMIN — SODIUM CHLORIDE, SODIUM LACTATE, POTASSIUM CHLORIDE, AND CALCIUM CHLORIDE: .6; .31; .03; .02 INJECTION, SOLUTION INTRAVENOUS at 08:11

## 2020-11-15 RX ADMIN — ENOXAPARIN SODIUM 40 MG: 40 INJECTION SUBCUTANEOUS at 05:11

## 2020-11-15 NOTE — PROGRESS NOTES
Surgery postoperative day 3    Patient resting well no nausea no vomiting no flatus.    Vital signs stable afebrile    Abdomen soft nontender nondistended    Assessment status post hand assisted right hemicolectomy doing well    Needs aggressive physical therapy    Awaiting resolution of expected postoperative ileus.  Will check electrolytes

## 2020-11-15 NOTE — NURSING
BP 86/48 HR 64, pt asymptomatic. Dr. Banegas notified. New orders given to hold AM amlodipine and give 1 L LR bolus. Will cont to monitor.

## 2020-11-15 NOTE — PLAN OF CARE
Problem: Adult Inpatient Plan of Care  Goal: Plan of Care Review  Outcome: Ongoing, Progressing  Flowsheets (Taken 11/15/2020 0419)  Plan of Care Reviewed With: patient     Pt free from falls, injury or any further trauma throughout shift. Pt AAO x 4. Continued medications as ordered. No complaints of pain throughout shift. Staples intact to abd incision. Pt up to bedside commode during shift. Pt in no distress. Will cont to monitor.

## 2020-11-16 PROCEDURE — 97116 GAIT TRAINING THERAPY: CPT

## 2020-11-16 PROCEDURE — 25000003 PHARM REV CODE 250: Performed by: SURGERY

## 2020-11-16 PROCEDURE — 11000001 HC ACUTE MED/SURG PRIVATE ROOM

## 2020-11-16 PROCEDURE — 97530 THERAPEUTIC ACTIVITIES: CPT

## 2020-11-16 PROCEDURE — 63600175 PHARM REV CODE 636 W HCPCS: Performed by: SURGERY

## 2020-11-16 RX ORDER — DEXTROSE MONOHYDRATE, SODIUM CHLORIDE, AND POTASSIUM CHLORIDE 50; 1.49; 4.5 G/1000ML; G/1000ML; G/1000ML
INJECTION, SOLUTION INTRAVENOUS CONTINUOUS
Status: DISCONTINUED | OUTPATIENT
Start: 2020-11-16 | End: 2020-11-18 | Stop reason: HOSPADM

## 2020-11-16 RX ADMIN — DONEPEZIL HYDROCHLORIDE 10 MG: 10 TABLET, FILM COATED ORAL at 08:11

## 2020-11-16 RX ADMIN — MEMANTINE HYDROCHLORIDE 5 MG: 5 TABLET ORAL at 09:11

## 2020-11-16 RX ADMIN — DEXTROSE, SODIUM CHLORIDE, AND POTASSIUM CHLORIDE: 5; .45; .15 INJECTION INTRAVENOUS at 07:11

## 2020-11-16 RX ADMIN — MUPIROCIN: 20 OINTMENT TOPICAL at 08:11

## 2020-11-16 RX ADMIN — FAMOTIDINE 20 MG: 20 TABLET, FILM COATED ORAL at 09:11

## 2020-11-16 RX ADMIN — HYDROMORPHONE HYDROCHLORIDE 0.5 MG: 2 INJECTION INTRAMUSCULAR; INTRAVENOUS; SUBCUTANEOUS at 09:11

## 2020-11-16 RX ADMIN — DEXTROSE, SODIUM CHLORIDE, AND POTASSIUM CHLORIDE: 5; .45; .15 INJECTION INTRAVENOUS at 09:11

## 2020-11-16 RX ADMIN — ROPINIROLE HYDROCHLORIDE 0.25 MG: 0.25 TABLET, FILM COATED ORAL at 08:11

## 2020-11-16 RX ADMIN — HYDROCODONE BITARTRATE AND ACETAMINOPHEN 1 TABLET: 10; 325 TABLET ORAL at 08:11

## 2020-11-16 RX ADMIN — HYDROCODONE BITARTRATE AND ACETAMINOPHEN 1 TABLET: 10; 325 TABLET ORAL at 02:11

## 2020-11-16 RX ADMIN — MUPIROCIN: 20 OINTMENT TOPICAL at 09:11

## 2020-11-16 RX ADMIN — AMLODIPINE BESYLATE 5 MG: 5 TABLET ORAL at 09:11

## 2020-11-16 RX ADMIN — ROPINIROLE HYDROCHLORIDE 0.25 MG: 0.25 TABLET, FILM COATED ORAL at 09:11

## 2020-11-16 RX ADMIN — ENOXAPARIN SODIUM 40 MG: 40 INJECTION SUBCUTANEOUS at 04:11

## 2020-11-16 RX ADMIN — LISINOPRIL 40 MG: 20 TABLET ORAL at 09:11

## 2020-11-16 RX ADMIN — GABAPENTIN 600 MG: 300 CAPSULE ORAL at 12:11

## 2020-11-16 RX ADMIN — GABAPENTIN 300 MG: 300 CAPSULE ORAL at 08:11

## 2020-11-16 RX ADMIN — GABAPENTIN 300 MG: 300 CAPSULE ORAL at 09:11

## 2020-11-16 RX ADMIN — HYDROMORPHONE HYDROCHLORIDE 0.5 MG: 2 INJECTION INTRAMUSCULAR; INTRAVENOUS; SUBCUTANEOUS at 02:11

## 2020-11-16 NOTE — PLAN OF CARE
11/16/20 1515   Medicare Message   Important Message from Medicare regarding Discharge Appeal Rights Explained to patient/caregiver   Date IMM was signed 11/16/20   Time IMM was signed 1512      explained IMM to patient's , Tim via phone. Tim expressed understanding. Copy will be mailed to [address] [facility] [place] [address].

## 2020-11-16 NOTE — PT/OT/SLP PROGRESS
Physical Therapy Treatment    Patient Name:  Annette Fallon   MRN:  [id number]    Recommendations:     Discharge Recommendations:  home health PT   Discharge Equipment Recommendations: walker, rolling   Barriers to discharge: None    Assessment:     Annette Fallon is a 80 y.o. female admitted with a medical diagnosis of Cancer of cecum.  She presents with the following impairments/functional limitations:  weakness, gait instability, impaired cardiopulmonary response to activity, impaired endurance, impaired balance, decreased lower extremity function, visual deficits, impaired self care skills, pain, impaired functional mobilty     Progressing. Needs to get OOB with RN staff to toilet and chair (she verbalized that she has not been). She is very deconditioned.  Recommend OT consult (Secure Chat has been sent to MD).   Progressive Mobility Level 3: Recommend patient be assisted out of bed to chair, toilet, and/or bedside commode with </= minimum assistance.       Rehab Prognosis: Good; patient would benefit from acute skilled PT services to address these deficits and reach maximum level of function.    Recent Surgery: Procedure(s) (LRB):  COLECTOMY, RIGHT, LAPAROSCOPIC (Right)  CYSTOSCOPY, WITH RETROGRADE PYELOGRAM AND URETERAL STENT INSERTION (Bilateral) 4 Days Post-Op    Plan:     During this hospitalization, patient to be seen 6 x/week to address the identified rehab impairments via gait training, therapeutic activities, therapeutic exercises, neuromuscular re-education and progress toward the following goals:    · Plan of Care Expires:  11/30/20    Subjective     Chief Complaint: abd pain  Patient/Family Comments/goals: agreed to walk without encouragement  Pain/Comfort:  · Pain Rating 1: 5/10  · Location 1: abdomen  · Pain Addressed 1: Nurse notified, Cessation of Activity      Objective:     Communicated with SINAN Trejo prior to session.  Patient found HOB elevated with peripheral IV, oxygen upon PT entry to  room.     General Precautions: Standard, fall, blind   Orthopedic Precautions:N/A   Braces: N/A     Functional Mobility:  · Bed Mobility:     · Supine to Sit: stand by assistance  · Transfers:     · Sit to Stand:  stand by assistance with rolling walker  · Gait: with Katelyn and RW ~ 100 ft  · C/o dizziness (mild, in NAD) with gait.   · O2 sats 95% on RA with gait. HR up to 120  · Progressive increase in abd pain with ambulation; pt declined to ambulate further  · Pt c/o legs feeling progressively weak   · Balance: good (-) with gait    D/w pt she must start mobilizing with unit staff OOB and to toilet to prevent further deconditioning. She verbalized agreement.       AM-PAC 6 CLICK MOBILITY  Turning over in bed (including adjusting bedclothes, sheets and blankets)?: 4  Sitting down on and standing up from a chair with arms (e.g., wheelchair, bedside commode, etc.): 3  Moving from lying on back to sitting on the side of the bed?: 3  Moving to and from a bed to a chair (including a wheelchair)?: 3  Need to walk in hospital room?: 3  Climbing 3-5 steps with a railing?: 3  Basic Mobility Total Score: 19       Therapeutic Activities and Exercises:   mobility as above    Patient left up in chair with call button in reach and RN Maya notified..    GOALS:   Multidisciplinary Problems     Physical Therapy Goals        Problem: Physical Therapy Goal    Goal Priority Disciplines Outcome Goal Variances Interventions   Physical Therapy Goal     PT, PT/OT Ongoing, Progressing     Description: Goals to be met by:      Patient will increase functional independence with mobility by performin. Supine to sit with Modified [place]  2. Sit to supine with Modified [place]  3. Sit to stand transfer with Modified [place]  4. Bed to chair transfer with Modified [place]   5. Gait  x 150 feet with Modified [place] using least restrictive AD, given pt's blindness   6. Lower extremity exercise program x20 reps  per handout, with supervision                     Time Tracking:     PT Received On: 11/16/20  PT Start Time: 1542     PT Stop Time: 1606  PT Total Time (min): 24 min     Billable Minutes: Gait Training 15 and Therapeutic Activity 9    Treatment Type: Treatment  PT/PTA: PT     PTA Visit Number: 0     Mahnaz Monacoum, PT  11/16/2020

## 2020-11-16 NOTE — PLAN OF CARE
Problem: Adult Inpatient Plan of Care  Goal: Plan of Care Review  Outcome: Ongoing, Progressing  Flowsheets (Taken 11/16/2020 0523)  Plan of Care Reviewed With: patient     Pt free from falls, injury or any further trauma throughout shift. Pt AAO x 4. Continued medications as ordered. Complaints of pain throughout shift, controlled with medications and repositioning. Staples intact to abd incision. Pt with incontinent episodes during shift. Frequent brief changes to prevent breakdown. No episodes of N/V, pt not passing flatus.Pt in no distress. Will cont to monitor.            Problem: Skin Injury Risk Increased  Goal: Skin Health and Integrity  Outcome: Ongoing, Progressing  Intervention: Optimize Skin Protection  Flowsheets (Taken 11/16/2020 0523)  Pressure Reduction Techniques:   frequent weight shift encouraged   weight shift assistance provided  Pressure Reduction Devices: positioning supports utilized  Skin Protection:   incontinence pads utilized   tubing/devices free from skin contact  Head of Bed (HOB): HOB at 20-30 degrees     Problem: Fall Injury Risk  Goal: Absence of Fall and Fall-Related Injury  Outcome: Ongoing, Progressing  Intervention: Identify and Manage Contributors to Fall Injury Risk  Flowsheets (Taken 11/16/2020 0523)  Medication Review/Management: medications reviewed  Intervention: Promote Injury-Free Environment  Flowsheets (Taken 11/16/2020 0523)  Safety Promotion/Fall Prevention:   assistive device/personal item within reach   bed alarm set   lighting adjusted   medications reviewed   side rails raised x 2   instructed to call staff for mobility

## 2020-11-16 NOTE — PLAN OF CARE
Problem: Physical Therapy Goal  Goal: Physical Therapy Goal  Description: Goals to be met by:      Patient will increase functional independence with mobility by performin. Supine to sit with Modified [place]  2. Sit to supine with Modified [place]  3. Sit to stand transfer with Modified [place]  4. Bed to chair transfer with Modified [place]   5. Gait  x 150 feet with Modified [place] using least restrictive AD, given pt's blindness   6. Lower extremity exercise program x20 reps per handout, with supervision    Outcome: Ongoing, Progressing    HHPT.   Cont 6x/week

## 2020-11-16 NOTE — PROGRESS NOTES
Surgery Progress Note    Primary Problem: Cancer of cecum    Other problems:   Active Hospital Problems    Diagnosis  POA    *Cancer of cecum [C18.0]  Yes      Resolved Hospital Problems   No resolved problems to display.       HD #  LOS: 4 days   POD #4 Days Post-Op status post laparoscopic right colectomy    Overnight events:  Doing well, no nausea or vomiting.  Denies any flatus.  Interested in trying regular food.      Diet - Diet clear liquid     I/O last 3 completed shifts:  In: 4440 [P.O.:1440; I.V.:3000]  Out: -     Intake/Output Summary (Last 24 hours) at 11/16/2020 0649  Last data filed at 11/16/2020 0500  Gross per 24 hour   Intake 5480 ml   Output --   Net 5480 ml       Temp:  [98.2 °F (36.8 °C)-98.7 °F (37.1 °C)] 98.4 °F (36.9 °C)  Pulse:  [71-81] 71  Resp:  [15-18] 18  SpO2:  [93 %-100 %] 96 %  BP: (125-159)/(60-74) 133/63    Gen: alert, well appearing, and in no distress,    Abdomen soft appropriately tender to palpation.  Incisions clean dry intact, no erythema    Recent Labs   Lab 11/09/20  1525 11/13/20  0602 11/15/20  0807   WBC 8.32 4.92  --    HGB 11.1* 10.0*  --    HCT 33.7* 30.8*  --     202  --     143 144   K 4.4 3.8 3.6    105 107   BUN 20 5* 2*   * 106 86   PROT 6.5  --   --    ALBUMIN 3.2*  --   --    BILITOT 0.3  --   --    AST 13  --   --    ALKPHOS 100  --   --    ALT 7*  --   --         Assessment:  Status post laparoscopic right colectomy    Plan:  We will advance to regular diet  MIVF  Await resolution of adynamic ileus    Chapo Garcia MD

## 2020-11-16 NOTE — PLAN OF CARE
Patient oriented to person place and time. Patient is blind, assisted with meals. Patient did not require pain medication during shift. Patient encouraged to notify nurse of pain before pain becomes severe. Patient verbalized understanding.  Patient incontinent, changed q 2 hours to prevent skin breakdown. Patient turns in bed. Encored to call for assistance and not to attempt to get oob alone. Patient verbalized understanding. Call light and personal items placed within reach.  Bed in low position a and lock. Bed alarm activated.

## 2020-11-17 LAB
POCT GLUCOSE: 114 MG/DL (ref 70–110)
POCT GLUCOSE: 152 MG/DL (ref 70–110)

## 2020-11-17 PROCEDURE — 25000003 PHARM REV CODE 250: Performed by: SURGERY

## 2020-11-17 PROCEDURE — 97110 THERAPEUTIC EXERCISES: CPT | Mod: CQ

## 2020-11-17 PROCEDURE — 27000221 HC OXYGEN, UP TO 24 HOURS

## 2020-11-17 PROCEDURE — 11000001 HC ACUTE MED/SURG PRIVATE ROOM

## 2020-11-17 PROCEDURE — 94761 N-INVAS EAR/PLS OXIMETRY MLT: CPT

## 2020-11-17 PROCEDURE — 97116 GAIT TRAINING THERAPY: CPT | Mod: CQ

## 2020-11-17 PROCEDURE — 63600175 PHARM REV CODE 636 W HCPCS: Performed by: SURGERY

## 2020-11-17 RX ADMIN — ONDANSETRON 4 MG: 2 INJECTION INTRAMUSCULAR; INTRAVENOUS at 03:11

## 2020-11-17 RX ADMIN — MUPIROCIN: 20 OINTMENT TOPICAL at 08:11

## 2020-11-17 RX ADMIN — DONEPEZIL HYDROCHLORIDE 10 MG: 10 TABLET, FILM COATED ORAL at 08:11

## 2020-11-17 RX ADMIN — HYDROCODONE BITARTRATE AND ACETAMINOPHEN 1 TABLET: 10; 325 TABLET ORAL at 08:11

## 2020-11-17 RX ADMIN — GABAPENTIN 300 MG: 300 CAPSULE ORAL at 08:11

## 2020-11-17 RX ADMIN — ROPINIROLE HYDROCHLORIDE 0.25 MG: 0.25 TABLET, FILM COATED ORAL at 08:11

## 2020-11-17 RX ADMIN — DEXTROSE, SODIUM CHLORIDE, AND POTASSIUM CHLORIDE: 5; .45; .15 INJECTION INTRAVENOUS at 03:11

## 2020-11-17 RX ADMIN — GABAPENTIN 600 MG: 300 CAPSULE ORAL at 11:11

## 2020-11-17 RX ADMIN — AMLODIPINE BESYLATE 5 MG: 5 TABLET ORAL at 08:11

## 2020-11-17 RX ADMIN — MEMANTINE HYDROCHLORIDE 5 MG: 5 TABLET ORAL at 08:11

## 2020-11-17 RX ADMIN — ENOXAPARIN SODIUM 40 MG: 40 INJECTION SUBCUTANEOUS at 05:11

## 2020-11-17 RX ADMIN — FAMOTIDINE 20 MG: 20 TABLET, FILM COATED ORAL at 08:11

## 2020-11-17 RX ADMIN — LISINOPRIL 40 MG: 20 TABLET ORAL at 08:11

## 2020-11-17 NOTE — PROGRESS NOTES
"gen surg pod 5  alfonso clears, very hungry, had bm, min inc pain  Path pending  Blood pressure 135/80, pulse 98, temperature 98.1 °F (36.7 °C), temperature source Oral, resp. rate 20, height 5' 6" (1.676 m), weight 72.3 kg (159 lb 6.3 oz), SpO2 (!) 90 %, not currently breastfeeding.  abd soft, bs present, nd, incs healing well, appropriate inc tenderness  A/p s/p lap R colecotmy for malignancy- adv diet, cont PT, may be ready for dc anival if cont to improve  "

## 2020-11-17 NOTE — ANESTHESIA POSTPROCEDURE EVALUATION
Anesthesia Post Evaluation    Patient: Annette Fallon    Procedure(s) Performed: Procedure(s) (LRB):  COLECTOMY, RIGHT, LAPAROSCOPIC (Right)  CYSTOSCOPY, WITH RETROGRADE PYELOGRAM AND URETERAL STENT INSERTION (Bilateral)    Final Anesthesia Type: general    Patient location during evaluation: PACU  Patient participation: Yes- Able to Participate  Level of consciousness: awake and alert  Post-procedure vital signs: reviewed and stable  Pain management: adequate  Airway patency: patent    PONV status at discharge: No PONV  Anesthetic complications: no      Cardiovascular status: blood pressure returned to baseline and hemodynamically stable  Respiratory status: unassisted and spontaneous ventilation  Hydration status: euvolemic  Follow-up not needed.          Vitals Value Taken Time   /71 11/17/20 0814   Temp 36.8 °C (98.3 °F) 11/17/20 0814   Pulse 92 11/17/20 0814   Resp 18 11/17/20 0814   SpO2 96 % 11/17/20 0814         Event Time   Out of Recovery 13:40:00         Pain/Jovan Score: Pain Rating Prior to Med Admin: 5 (11/16/2020  8:57 PM)  Pain Rating Post Med Admin: 0 (11/16/2020  3:30 AM)  Jovan Score: 10 (11/16/2020  7:05 PM)

## 2020-11-17 NOTE — PLAN OF CARE
Pt  rested comfortably in bed with no acute events. Afebrile.Remained free of falls and injury.  Hourly  checks done. Incontinence pads checked and changed. Had x1 BM and voided x2.Repositioned  q2h. PRN pain medication given per pt request for abd pain. . Bed low with wheels locked  and alarm on.  Call  bell within reach at all times;Pt able to make needs known to staff.All needs met prior to leaving pt room. Will continue to monitor and follow treatment plan.     Problem: Fall Injury Risk  Goal: Absence of Fall and Fall-Related Injury  Outcome: Ongoing, Progressing  Intervention: Identify and Manage Contributors to Fall Injury Risk  Flowsheets (Taken 11/17/2020 0618)  Self-Care Promotion:   independence encouraged   BADL personal objects within reach   BADL personal routines maintained  Medication Review/Management:   medications reviewed   high risk medications identified     Problem: Infection  Goal: Infection Symptom Resolution  Outcome: Ongoing, Progressing  Intervention: Prevent or Manage Infection  Flowsheets (Taken 11/17/2020 0618)  Fever Reduction/Comfort Measures:   lightweight bedding   lightweight clothing   medication administered  Infection Management: aseptic technique maintained  Isolation Precautions: protective environment maintained     Problem: Pain Acute  Goal: Optimal Pain Control  Outcome: Ongoing, Progressing  Intervention: Develop Pain Management Plan  Flowsheets (Taken 11/17/2020 0618)  Pain Management Interventions:   awakened for pain meds per patient request   prescribed exercises encouraged   quiet environment facilitated   pillow support provided   around-the-clock dosing utilized   care clustered

## 2020-11-18 VITALS
HEART RATE: 84 BPM | RESPIRATION RATE: 19 BRPM | TEMPERATURE: 98 F | SYSTOLIC BLOOD PRESSURE: 115 MMHG | OXYGEN SATURATION: 91 % | BODY MASS INDEX: 25.61 KG/M2 | HEIGHT: 66 IN | DIASTOLIC BLOOD PRESSURE: 56 MMHG | WEIGHT: 159.38 LBS

## 2020-11-18 LAB
FINAL PATHOLOGIC DIAGNOSIS: NORMAL
GROSS: NORMAL
Lab: NORMAL
MICROSCOPIC EXAM: NORMAL

## 2020-11-18 PROCEDURE — 25000003 PHARM REV CODE 250: Performed by: SURGERY

## 2020-11-18 PROCEDURE — 94761 N-INVAS EAR/PLS OXIMETRY MLT: CPT

## 2020-11-18 PROCEDURE — 63600175 PHARM REV CODE 636 W HCPCS: Performed by: SURGERY

## 2020-11-18 PROCEDURE — 97530 THERAPEUTIC ACTIVITIES: CPT | Mod: CQ

## 2020-11-18 PROCEDURE — 97116 GAIT TRAINING THERAPY: CPT | Mod: CQ

## 2020-11-18 RX ORDER — HYDROCODONE BITARTRATE AND ACETAMINOPHEN 10; 325 MG/1; MG/1
1 TABLET ORAL EVERY 4 HOURS PRN
Qty: 28 TABLET | Refills: 0 | Status: SHIPPED | OUTPATIENT
Start: 2020-11-18 | End: 2020-11-25

## 2020-11-18 RX ADMIN — MUPIROCIN: 20 OINTMENT TOPICAL at 08:11

## 2020-11-18 RX ADMIN — FAMOTIDINE 20 MG: 20 TABLET, FILM COATED ORAL at 08:11

## 2020-11-18 RX ADMIN — ONDANSETRON 4 MG: 2 INJECTION INTRAMUSCULAR; INTRAVENOUS at 07:11

## 2020-11-18 RX ADMIN — HYDROCODONE BITARTRATE AND ACETAMINOPHEN 1 TABLET: 10; 325 TABLET ORAL at 04:11

## 2020-11-18 RX ADMIN — ROPINIROLE HYDROCHLORIDE 0.25 MG: 0.25 TABLET, FILM COATED ORAL at 08:11

## 2020-11-18 RX ADMIN — AMLODIPINE BESYLATE 5 MG: 5 TABLET ORAL at 08:11

## 2020-11-18 RX ADMIN — MEMANTINE HYDROCHLORIDE 5 MG: 5 TABLET ORAL at 08:11

## 2020-11-18 RX ADMIN — GABAPENTIN 300 MG: 300 CAPSULE ORAL at 08:11

## 2020-11-18 RX ADMIN — LISINOPRIL 40 MG: 20 TABLET ORAL at 08:11

## 2020-11-18 RX ADMIN — GABAPENTIN 600 MG: 300 CAPSULE ORAL at 12:11

## 2020-11-18 NOTE — PROGRESS NOTES
[facility] CASE MANAGEMENT                  WRITTEN DISCHARGE INFORMATION      APPOINTMENTS AND RESOURCES TO HELP YOU MANAGE YOUR CARE AT HOME BASED ON YOUR PREFERENCES:  Follow-up Information     Ranjit Ott III, MD. Go on 11/30/2020.    Specialty: Surgery  Why: Outpatient Services: Hospital F/U with Surgeon at 2:00 pm.   Contact information:  [address] [place] [address] [phone]             [facility].    Why: Home Health will call patient to schedule initial visit.   Contact information:  [address]  [address]  [place] [address] [phone]               Healthy Living Instructions to HELP MANAGE YOUR CARE AT HOME:  Things You are responsible for:  1.    Getting your prescriptions filled   2.    Taking your medications as directed, DO NOT MISS ANY DOSES!  3.    Following the diet and exercise recommended by your doctor  4.    Going to your follow-up doctor appointment. This is important because it allows the doctor to monitor your progress and determine if any changes need to made to your treatment plan.  5. If you have any questions about MANAGING YOUR CARE AT HOME Call the Nurse Care Line for 24/7 Assistance [phone]       Please answer any calls you may receive from [facility]. We want to continue to support you as you manage your healthcare needs. [facility] is happy to have the opportunity to serve you.      Thank you for choosing [facility] for your healthcare needs!  Your [facility] Case Management Team,    Josselin Rivera   II  Care Management  [phone]

## 2020-11-18 NOTE — PLAN OF CARE
[facility] HOME HEALTH ORDERS  FACE TO FACE ENCOUNTER    Patient Name: Annette Fallon  YOB: 1940    PCP: W[facility]ellan [facility]   PCP Address: [address] [place] [address] / [place] [address]  PCP Phone Number: [phone]  PCP Fax: [phone]       Encounter Date: 11/18/2020    Admit to Home Health    Diagnoses:  Active Hospital Problems    Diagnosis  POA    *Cancer of cecum [C18.0]  Yes      Resolved Hospital Problems   No resolved problems to display.       No future appointments.  Follow-up Information     Ranjit Ott III, MD. Go on 11/30/2020.    Specialty: Surgery  Why: Outpatient Services: Hospital F/U with Surgeon at 2:00 pm.   Contact information:  [address]   [place] [address] [phone]             [facility].    Why: [facility] will call patient to schedule initial visit.   Contact information:  [address] [facility]  [address]  [place] [id number] [phone]                   I have seen and examined this patient face to face today. My clinical findings that support the need for the home health skilled services and home bound status are the following:  Requiring assistive device to leave home due to unsteady gait caused by  Surgery.    Allergies:Review of patient's allergies indicates:  No Known Allergies    Diet: regular diet    Activities: activity as tolerated    Nursing:   SN to complete comprehensive assessment including routine vital signs. Instruct on disease process and s/s of complications to report to MD. Review/verify medication list sent home with the patient at time of discharge  and instruct patient/caregiver as needed. Frequency may be adjusted depending on start of care date.    Notify MD if SBP > 160 or < 90; DBP > 90 or < 50; HR > 120 or < 50; Temp > 101; Other:         CONSULTS:    Physical Therapy to evaluate and treat. Evaluate for home safety and equipment needs; Establish/upgrade home exercise program. Perform / instruct on  therapeutic exercises, gait training, transfer training, and Range of Motion.    MISCELLANEOUS CARE:  NA    WOUND CARE ORDERS  n/a and May Shower with soap and water      Medications: Review discharge medications with patient and family and provide education.      Current Discharge Medication List      CONTINUE these medications which have CHANGED    Details   HYDROcodone-acetaminophen (NORCO)  mg per tablet Take 1 tablet by mouth every 4 (four) hours as needed for Pain.  Qty: 28 tablet, Refills: 0    Comments: Quantity prescribed more than 7 day supply? Yes, quantity medically necessary         CONTINUE these medications which have NOT CHANGED    Details   amLODIPine (NORVASC) 5 MG tablet Take 5 mg by mouth once daily.      atorvastatin (LIPITOR) 10 MG tablet Take 20 mg by mouth once daily.       baclofen (LIORESAL) 10 MG tablet Take 1 tablet (10 mg total) by mouth 3 (three) times daily.  Qty: 90 tablet, Refills: 0      calcium carbonate (OS-JOSÉ ANTONIO) 500 mg calcium (1,250 mg) tablet Take 1 tablet by mouth once daily.      donepezil (ARICEPT) 10 MG tablet Take 10 mg by mouth every evening.      ergocalciferol (VITAMIN D2) 50,000 unit Cap Take 50,000 Units by mouth every 7 days.      escitalopram oxalate (LEXAPRO) 10 MG tablet Take 10 mg by mouth once daily.      famotidine (PEPCID) 20 MG tablet Take 1 tablet (20 mg total) by mouth 2 (two) times daily.  Qty: 60 tablet, Refills: 0      ferrous sulfate 325 (65 FE) MG EC tablet Take 1 tablet (325 mg total) by mouth once daily.  Refills: 0      FLUoxetine 20 MG capsule Take 20 mg by mouth once daily.      gabapentin (NEURONTIN) 100 MG capsule Take 300 mg by mouth once daily.       lisinopril (PRINIVIL,ZESTRIL) 40 MG tablet Take 40 mg by mouth once daily.      memantine (NAMENDA) 5 MG Tab Take 5 mg by mouth once daily.      omeprazole (PRILOSEC) 20 MG capsule Take 20 mg by mouth once daily.      ropinirole (REQUIP) 0.25 MG tablet Take 0.25 mg by mouth 2 (two) times  daily.      tizanidine (ZANAFLEX) 4 MG tablet Take 4 mg by mouth every 6 (six) hours as needed.      alendronate (FOSAMAX) 70 MG tablet Take 70 mg by mouth every 7 days.      alprazolam (XANAX) 1 MG tablet Take 1 mg by mouth 2 (two) times daily.             I certify that this patient is confined to her home and needs intermittent skilled nursing care and physical therapy.

## 2020-11-18 NOTE — PLAN OF CARE
"EDUCATION:  SW provided patient with educational information on Post Op.  Information was reviewed and placed in "My Healthcare Packet" to be brought home for use as a resource after discharge.  Information included: signs and symptoms to look for and call the doctor if experiencing, and symptoms that may indicate a medical emergency: CALL 911.  All questions answered.  Teach back method used. Patient stated that he/she will remember the following signs and symptoms: fever of 100.5. SW gave patient follow up appointments for Post op with surgeon. Patient will have home health with [facility].  ARIANA spoke with nurse Quarles and informed her that patient was ready for discharge from  standpoint.        11/18/20 1333   Final Note   Assessment Type Final Discharge Note   Anticipated Discharge Disposition Home-Health   What phone number can be called within the next 1-3 days to see how you are doing after discharge? [phone]   Hospital Follow Up  Appt(s) scheduled? Yes   Discharge plans and expectations educations in teach back method with documentation complete? Yes   Right Care Referral Info   Post Acute Recommendation Home-care   Referral Type Home Care   Facility Name [facility] [place] [place]   Post-Acute Status   Post-Acute Authorization Home Health   Home Health Status Set-up Complete   Discharge Delays None known at this time                                                 "

## 2020-11-18 NOTE — DISCHARGE SUMMARY
[facility]  General Surgery  Discharge Summary      Patient Name: Annette Fallon  MRN: [id number]  Admission Date: 11/12/2020  Hospital Length of Stay: 6 days  Discharge Date and Time:  11/18/2020 6:30 AM  Attending Physician: Ranjit Ott III,*   Discharging Provider: Chapo Garcia MD  Primary Care Provider: [facility]    HPI:   80-year-old female admitted for an elective laparoscopic right colectomy    Procedure(s) (LRB):  COLECTOMY, RIGHT, LAPAROSCOPIC (Right)  CYSTOSCOPY, WITH RETROGRADE PYELOGRAM AND URETERAL STENT INSERTION (Bilateral)      Indwelling Lines/Drains at time of discharge:   Lines/Drains/Airways     None               Hospital Course: Patient underwent a laparoscopic right colectomy by Dr. Ott.  Postoperatively she was transferred to the floor given a clear liquid diet.  She worked with physical therapy throughout her stay with assistance in ambulating in the halls.  Her diet was slowly advanced as tolerated.  Upon postop day 6.  The patient was having bowel movements, tolerating a regular diet without any issues.  She denied much abdominal pain.  She worked with physical therapy without issues.  On postop day 6.  She felt well enough to go home in and wished to be discharged.    Consults:     Significant Diagnostic Studies: Labs: BMP: No results for input(s): GLU, NA, K, CL, CO2, BUN, CREATININE, CALCIUM, MG in the last 48 hours. and CBC No results for input(s): WBC, HGB, HCT, PLT in the last 48 hours.    Pending Diagnostic Studies:     Procedure Component Value Units Date/Time    Specimen to Pathology, Surgery General Surgery [[id number]] Collected: 11/12/20 1245    Order Status: Sent Lab Status: In process Updated: 11/13/20 0928        Final Active Diagnoses:    Diagnosis Date Noted POA    PRINCIPAL PROBLEM:  Cancer of cecum [C18.0] 11/12/2020 Yes      Problems Resolved During this Admission:      Discharged Condition: good    Disposition: Home or  Self Care    Follow Up:  Follow-up Information     Ranjit Ott III, MD. Go on 11/30/2020.    Specialty: Surgery  Why: Outpatient Services: Hospital F/U with Surgeon at 2:00 pm.   Contact information:  [address] [place] [address] [phone]                 Patient Instructions:      Diet Adult Regular     Lifting restrictions   Order Comments: No lifting > 10 pounds for 2 weeks     No dressing needed   Order Comments: May shower of post-op day #1     Medications:  Reconciled Home Medications:      Medication List      CHANGE how you take these medications    HYDROcodone-acetaminophen  mg per tablet  Commonly known as: NORCO  Take 1 tablet by mouth every 4 (four) hours as needed for Pain.  What changed:   · how much to take  · when to take this  · reasons to take this        CONTINUE taking these medications    alendronate 70 MG tablet  Commonly known as: FOSAMAX  Take 70 mg by mouth every 7 days.     ALPRAZolam 1 MG tablet  Commonly known as: XANAX  Take 1 mg by mouth 2 (two) times daily.     amLODIPine 5 MG tablet  Commonly known as: NORVASC  Take 5 mg by mouth once daily.     atorvastatin 10 MG tablet  Commonly known as: LIPITOR  Take 20 mg by mouth once daily.     baclofen 10 MG tablet  Commonly known as: LIORESAL  Take 1 tablet (10 mg total) by mouth 3 (three) times daily.     calcium carbonate 500 mg calcium (1,250 mg) tablet  Commonly known as: OS-JOSÉ ANTONIO  Take 1 tablet by mouth once daily.     donepeziL 10 MG tablet  Commonly known as: ARICEPT  Take 10 mg by mouth every evening.     escitalopram oxalate 10 MG tablet  Commonly known as: LEXAPRO  Take 10 mg by mouth once daily.     famotidine 20 MG tablet  Commonly known as: PEPCID  Take 1 tablet (20 mg total) by mouth 2 (two) times daily.     ferrous sulfate 325 (65 FE) MG EC tablet  Take 1 tablet (325 mg total) by mouth once daily.     FLUoxetine 20 MG capsule  Take 20 mg by mouth once daily.     gabapentin 100 MG capsule  Commonly known as:  NEURONTIN  Take 300 mg by mouth once daily.     lisinopriL 40 MG tablet  Commonly known as: PRINIVIL,ZESTRIL  Take 40 mg by mouth once daily.     memantine 5 MG Tab  Commonly known as: NAMENDA  Take 5 mg by mouth once daily.     omeprazole 20 MG capsule  Commonly known as: PRILOSEC  Take 20 mg by mouth once daily.     rOPINIRole 0.25 MG tablet  Commonly known as: REQUIP  Take 0.25 mg by mouth 2 (two) times daily.     tiZANidine 4 MG tablet  Commonly known as: ZANAFLEX  Take 4 mg by mouth every 6 (six) hours as needed.     VITAMIN D2 50,000 unit Cap  Generic drug: ergocalciferol  Take 50,000 Units by mouth every 7 days.          Time spent on the discharge of patient: 25 minutes    Chapo Garcia MD  General Surgery  [facility]

## 2020-11-18 NOTE — PLAN OF CARE
Pt  rested comfortably in bed  with eyes closed. No acute events. Afebrile.Remained free of falls and injury.  Mid abd incision well approximated with staples.PATT; C/D/I. .Hourly  checks done. Incontinence pads checked and changed. Repositioned  q2h. PRN pain medication given per pt request for  headache and abdomen pain. Bed low with wheels locked  and alarm on.  Call  bell within reach at all times;Pt able to make needs known to staff.All needs met prior to leaving pt room. Will continue to monitor and follow treatment plan.     Problem: Fall Injury Risk  Goal: Absence of Fall and Fall-Related Injury  Outcome: Ongoing, Progressing  Intervention: Identify and Manage Contributors to Fall Injury Risk  Flowsheets (Taken 11/18/2020 0524)  Self-Care Promotion:   independence encouraged   BADL personal objects within reach   BADL personal routines maintained  Medication Review/Management:   medications reviewed   high risk medications identified     Problem: Infection  Goal: Infection Symptom Resolution  Outcome: Ongoing, Progressing  Intervention: Prevent or Manage Infection  Flowsheets (Taken 11/18/2020 0524)  Fever Reduction/Comfort Measures:   lightweight bedding   lightweight clothing   medication administered  Infection Management: aseptic technique maintained  Isolation Precautions: protective environment maintained     Problem: Pain Acute  Goal: Optimal Pain Control  Outcome: Ongoing, Progressing  Intervention: Develop Pain Management Plan  Flowsheets (Taken 11/18/2020 0524)  Pain Management Interventions:   awakened for pain meds per patient request   around-the-clock dosing utilized   pain management plan reviewed with patient/caregiver   care clustered   position adjusted   pillow support provided   quiet environment facilitated  Intervention: Prevent or Manage Pain  Flowsheets (Taken 11/18/2020 0524)  Sleep/Rest Enhancement:   awakenings minimized   consistent schedule promoted   family presence promoted

## 2020-11-18 NOTE — PLAN OF CARE
SW contacted patient's  to discuss discharge plans. SW informed that PT/OT recommended that patient have home health PT.  stated that he would like for patient to have home health but didn't have a preference. SW inquired if he would like to use [facility].  said yes. ARIANA sent referral to [facility] .       11/18/20 1246   Post-Acute Status   Post-Acute Authorization Home Health   Home Health Status Pending Medical Review   Discharge Delays None known at this time   Discharge Plan   Discharge Plan A Home Health   Discharge Plan B Home

## 2020-11-18 NOTE — PT/OT/SLP PROGRESS
Physical Therapy Treatment    Patient Name:  Annette Fallon   MRN:  [id number]    Recommendations:     Discharge Recommendations:  home health PT   Discharge Equipment Recommendations: walker, rolling   Barriers to discharge: None    Assessment:     Annette Fallon is a 80 y.o. female admitted with a medical diagnosis of Cancer of cecum.  She presents with the following impairments/functional limitations:  weakness, impaired endurance, impaired self care skills, impaired functional mobilty, gait instability, impaired balance, decreased coordination, decreased lower extremity function, decreased safety awareness, pain, impaired skin, edema. Pt SBA with sup to sit. Sat EOB with F+ balance use BUE for balance while performing LE therex. Pt to short dist of gt with rw with CGA until she became nauseated. Session discontinue. Nurse notified.    Rehab Prognosis: Good; patient would benefit from acute skilled PT services to address these deficits and reach maximum level of function.    Recent Surgery: Procedure(s) (LRB):  COLECTOMY, RIGHT, LAPAROSCOPIC (Right)  CYSTOSCOPY, WITH RETROGRADE PYELOGRAM AND URETERAL STENT INSERTION (Bilateral) 6 Days Post-Op    Plan:     During this hospitalization, patient to be seen 6 x/week to address the identified rehab impairments via gait training, therapeutic activities, therapeutic exercises, neuromuscular re-education and progress toward the following goals:    · Plan of Care Expires:  11/30/20    Subjective     Chief Complaint: nauseated and dizzy  Patient/Family Comments/goals: pt c/o feeling nauseated during amb. Pt began vomitting. amb discontinued. nsg notified. Nurse administered zofran.  Pain/Comfort:  ·        Objective:     Communicated with nurse prior to session.  Patient found HOB elevated with oxygen, peripheral IV upon PT entry to room.     General Precautions: Standard, fall, blind   Orthopedic Precautions:N/A   Braces:       Functional Mobility:  · Bed Mobility:      · Rolling Left:  modified independence  · Scooting: supervision  · Supine to Sit: supervision  · Transfers:     · Sit to Stand:  contact guard assistance with rolling walker  · Bed to Chair: contact guard assistance with  rolling walker  using  Step Transfer  · Gait: 10 ft with rw with CGA and vcs for direction  · Balance: F+ standing with rw      AM-PAC 6 CLICK MOBILITY          Therapeutic Activities and Exercises:   BLE TE seated EOB with BUE supported x 10; LAQ, hip flexion, HS, HR, TT, pillow squeezes    Patient left up in chair with all lines intact, call button in reach and nurse present..    GOALS:   Multidisciplinary Problems     Physical Therapy Goals        Problem: Physical Therapy Goal    Goal Priority Disciplines Outcome Goal Variances Interventions   Physical Therapy Goal     PT, PT/OT Ongoing, Progressing     Description: Goals to be met by:      Patient will increase functional independence with mobility by performin. Supine to sit with Modified [place]  2. Sit to supine with Modified [place]  3. Sit to stand transfer with Modified [place]  4. Bed to chair transfer with Modified [place]   5. Gait  x 150 feet with Modified [place] using least restrictive AD, given pt's blindness   6. Lower extremity exercise program x20 reps per handout, with supervision                     Time Tracking:     PT Received On: 20  PT Start Time: 1445     PT Stop Time: 1515  PT Total Time (min): 30 min     Billable Minutes: Gait Training 15 and Therapeutic Exercise 15    Treatment Type: Treatment  PT/PTA: PTA     PTA Visit Number: 1     Eitan Howe PTA  2020

## 2020-11-18 NOTE — PLAN OF CARE
Problem: Physical Therapy Goal  Goal: Physical Therapy Goal  Description: Goals to be met by:      Patient will increase functional independence with mobility by performin. Supine to sit with Modified [place]  2. Sit to supine with Modified [place]  3. Sit to stand transfer with Modified [place]  4. Bed to chair transfer with Modified [place]   5. Gait  x 150 feet with Modified [place] using least restrictive AD, given pt's blindness   6. Lower extremity exercise program x20 reps per handout, with supervision    Outcome: Ongoing, Progressing   Pt with increased gt distance today.  Pt amb 120 ft with rw with CGA with min A and VCS for RW management and direction.

## 2020-11-18 NOTE — NURSING
Discharge instructions reviewed via phone with Tim Fallon (),verbalized understanding, will pick patient up from ER.

## 2020-11-18 NOTE — NURSING
Pt report received from SINAN Trejo  And care assumed. Per rt pt tolerated BF and Lunch but refused dinner.Pt denies nausea. Snacks offered.Pt ate pudding with no concerns. .IVF  stopped per MD nursing comm order. Will continue to monitor

## 2020-11-18 NOTE — PLAN OF CARE
Problem: Physical Therapy Goal  Goal: Physical Therapy Goal  Description: Goals to be met by:      Patient will increase functional independence with mobility by performin. Supine to sit with Modified [place]  2. Sit to supine with Modified [place]  3. Sit to stand transfer with Modified [place]  4. Bed to chair transfer with Modified [place]   5. Gait  x 150 feet with Modified [place] using least restrictive AD, given pt's blindness   6. Lower extremity exercise program x20 reps per handout, with supervision    Outcome: Ongoing, Progressing   Pt SBA with sup to sit. Sat EOB with F+ balance use BUE for balance while performing LE therex. Pt to short dist of gt with rw with CGA until she became nauseated. Session discontinue. Nurse notified.

## 2020-11-18 NOTE — PLAN OF CARE
Problem: Skin Injury Risk Increased  Goal: Skin Health and Integrity  Outcome: Ongoing, Progressing     Problem: Fall Injury Risk  Goal: Absence of Fall and Fall-Related Injury  Outcome: Ongoing, Progressing

## 2020-11-18 NOTE — HOSPITAL COURSE
Patient underwent a laparoscopic right colectomy by Dr. Ott.  Postoperatively she was transferred to the floor given a clear liquid diet.  She worked with physical therapy throughout her stay with assistance in ambulating in the halls.  Her diet was slowly advanced as tolerated.  Upon postop day 6.  The patient was having bowel movements, tolerating a regular diet without any issues.  She denied much abdominal pain.  She worked with physical therapy without issues.  On postop day 6.  She felt well enough to go home in and wished to be discharged.

## 2020-11-18 NOTE — PT/OT/SLP PROGRESS
"Physical Therapy Treatment    Patient Name:  Annette Fallon   MRN:  [id number]    Recommendations:     Discharge Recommendations:  home health PT   Discharge Equipment Recommendations: walker, rolling   Barriers to discharge: None    Assessment:     Annette Fallon is a 80 y.o. female admitted with a medical diagnosis of Cancer of cecum.  She presents with the following impairments/functional limitations:  weakness, impaired endurance, impaired self care skills, impaired functional mobilty, gait instability, impaired balance, visual deficits, decreased lower extremity function, decreased safety awareness, pain, impaired coordination, impaired skin, edema. Pt with increased gt distance today.  Pt amb 120 ft with rw with CGA with min A and VCS for RW management and direction.     Rehab Prognosis: Good; patient would benefit from acute skilled PT services to address these deficits and reach maximum level of function.    Recent Surgery: Procedure(s) (LRB):  COLECTOMY, RIGHT, LAPAROSCOPIC (Right)  CYSTOSCOPY, WITH RETROGRADE PYELOGRAM AND URETERAL STENT INSERTION (Bilateral) 6 Days Post-Op    Plan:     During this hospitalization, patient to be seen 6 x/week to address the identified rehab impairments via gait training, therapeutic activities, therapeutic exercises, neuromuscular re-education and progress toward the following goals:    · Plan of Care Expires:  11/30/20    Subjective     Chief Complaint: i'm ready to go home  Patient/Family Comments/goals: "I need to be cleaned.  My  is downstairs."  Pain/Comfort:  · Pain Rating 1: 0/10  · Pain Rating Post-Intervention 1: 0/10      Objective:     Communicated with nurse prior to session.  Patient found HOB elevated with peripheral IV upon PT entry to room.     General Precautions: Standard, fall, blind   Orthopedic Precautions:N/A   Braces: N/A     Functional Mobility:  · Bed Mobility:     · Rolling Left:  modified independence  · Rolling Right: modified " independence  · Scooting: modified independence  · Bridging: modified independence  · Supine to Sit: supervision  · Sit to Supine: supervision  · Transfers:     · Sit to Stand:  supervision with rolling walker  · Gait: 120 ft with rw with CGA with decreased alexa, decreased step length and height, decreased wt shifting      AM-PAC 6 CLICK MOBILITY  Turning over in bed (including adjusting bedclothes, sheets and blankets)?: 4  Sitting down on and standing up from a chair with arms (e.g., wheelchair, bedside commode, etc.): 4  Moving from lying on back to sitting on the side of the bed?: 4  Moving to and from a bed to a chair (including a wheelchair)?: 3  Need to walk in hospital room?: 3  Climbing 3-5 steps with a railing?: 3  Basic Mobility Total Score: 21       Therapeutic Activities and Exercises:   rolling L<>R WITH mod I with vcs to use side rail for UE asst.  Sat EOB x 10 mins for donning gown with good balance    Patient left HOB elevated with all lines intact, call button in reach, bed alarm on and nurse notified..    GOALS:   Multidisciplinary Problems     Physical Therapy Goals        Problem: Physical Therapy Goal    Goal Priority Disciplines Outcome Goal Variances Interventions   Physical Therapy Goal     PT, PT/OT Ongoing, Progressing     Description: Goals to be met by:      Patient will increase functional independence with mobility by performin. Supine to sit with Modified [place]  2. Sit to supine with Modified [place]  3. Sit to stand transfer with Modified [place]  4. Bed to chair transfer with Modified [place]   5. Gait  x 150 feet with Modified [place] using least restrictive AD, given pt's blindness   6. Lower extremity exercise program x20 reps per handout, with supervision                     Time Tracking:     PT Received On: 20  PT Start Time: 1230     PT Stop Time: 1257  PT Total Time (min): 27 min     Billable Minutes: Gait Training 10 and  Therapeutic Activity 17    Treatment Type: Treatment  PT/PTA: PTA     PTA Visit Number: 2     Eitan Howe, PTA  11/18/2020

## 2020-11-19 ENCOUNTER — PATIENT OUTREACH (OUTPATIENT)
Dept: ADMINISTRATIVE | Facility: CLINIC | Age: 80
End: 2020-11-19

## 2020-11-19 NOTE — PHYSICIAN QUERY
PT Name: Annette Fallon  MR #: [id number]    Pathology Findings Clarification     CDS: Skylar ARELLANO RN  Contact information: [email or web address]    This form is a permanent document in the medical record.     Query Date: November 19, 2020    By submitting this query, we are merely seeking further clarification of documentation.  Please utilize your independent clinical judgment when addressing the question(s) below.    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    Regional lymph nodes        -Number of lymph nodes involved:  2        -Number of lymph nodes examined: 17   Pathologic stage classification(tnm0        -Primary tumor (pT): pT4a (Tumor invades through the visceral peritoneum)        -Regional lymph nodes (pN): pN1b (Two or three regional lymph nodes are Surgical Pathology 11/12/2020      Surgical Pathology 11/12/2020     Please clarify:  [x  ] Pathology findings noted above are ruled in/confirmed as diagnoses   [  ] Pathology findings noted above are not confirmed as diagnoses   [  ] Other diagnosis (please specify): ___________   [  ] Clinically Undetermined       Please document in your progress notes daily for the duration of treatment until resolved and include in your discharge summary.

## 2020-11-19 NOTE — PATIENT INSTRUCTIONS
Discharge Instructions for Total Abdominal Colectomy  A total abdominal colectomy is surgery to remove your colon. Your colon, also called the large intestine, is part of your bowel. A colectomy is done to remove disease, such as cancer, polyps, and inflammatory bowel disease, and to relieve the symptoms you have been having, such as bleeding, blockage, and pain.  Activity  · Ask your friends and family to help with chores and errands while you recover.  · Walk on a regular basis. Start with short walks each day. Gradually increase the distance you walk and how often you walk.  · Dont lift anything heavier than 10 pounds for the first 6 weeks after your surgery.  · Dont drive for 2 weeks after surgery or as directed by your doctor. Dont drive while you are taking prescription pain medicine.  · Ask your doctor when you can expect to return to work. Most people are able to return to work within 4 to 6 weeks after surgery.  Other home care  · Diarrhea or loose stools are common after surgery, and can last weeks to months. If you have watery, or bloody diarrhea, call your surgeon. This may be a sign of a bowel infection or other problem.  · Follow the diet prescribed for you in the hospital. Slowly add foods until you get back to your regular diet. If a food gives you stomach or bowel problems, avoid it for a while.  · Initially, you may be on a low fiber diet. After this, adding fiber can help with the diarrhea. If it is severe, your doctor may add a medicine for the diarrhea as well.  · You may use pain medicine as directed by your provider. Discuss your best option before leaving the hospital and at your post operative visit.  · Use nutritional supplements or shakes as directed by your doctor.  · Drink at least 8 glasses of water every day, unless directed otherwise. It's very important to avoid dehydration, especially if you have an ostomy (a bag that collects stool) or diarrhea.   · Take your medicines exactly  as directed. Dont skip doses.  · Shower or bathe as directed by your healthcare provider. Gently wash your incision with soap and water and pat dry.  · Avoid tub baths until the staples in your incision have been removed.  Follow-up care  Make a follow-up appointment as directed by our staff.     When to call your healthcare provider  Call your healthcare provider right away if you have any of the following:  · Fever of 100.4°F (38°C) or higher, or as directed by your healthcare provider  · Diarrhea that lasts more than 3 days  · Nausea and vomiting that wont go away  · Pain in your abdomen that gets worse or isnt relieved by pain medicine  · Drainage or redness around your incision  · Bright red or dark black stools   Date Last Reviewed: 1/1/2017  © 1698-8405 [employer]. [address] [place] [address], [place] [address]. All rights reserved. This information is not intended as a substitute for professional medical care. Always follow your healthcare professional's instructions.

## 2020-11-19 NOTE — PROGRESS NOTES
Spoke with patient, states that she did not receive anything for pain, Advised that an order for Norco  was sent to the pharmacy. Pt states she was not aware. Called pharmacy spoke with pharmacist Osman, states script was picked up 11/18 @ 3171. Called patient and informed, states that her sister in law picked them up but she did know.

## 2020-11-19 NOTE — PROGRESS NOTES
C3 nurse attempted to contact patient. The following occurred:   C3 nurse attempted to contact Annette Fallon for a TCC post hospital discharge follow up call. The patient is unable to conduct the call @ this time. The patient requested a callback.    The patient does not have a scheduled HOSFU appointment within 7-14 days post hospital discharge date 11/17/2020. Non-[facility] PCP.

## 2020-12-03 ENCOUNTER — CLINICAL SUPPORT (OUTPATIENT)
Dept: SMOKING CESSATION | Facility: CLINIC | Age: 80
End: 2020-12-03
Payer: COMMERCIAL

## 2020-12-03 ENCOUNTER — TELEPHONE (OUTPATIENT)
Dept: HEMATOLOGY/ONCOLOGY | Facility: CLINIC | Age: 80
End: 2020-12-03

## 2020-12-03 DIAGNOSIS — F17.200 NICOTINE DEPENDENCE: Primary | ICD-10-CM

## 2020-12-03 PROCEDURE — 99407 PR TOBACCO USE CESSATION INTENSIVE >10 MINUTES: ICD-10-PCS | Mod: S$GLB,,,

## 2020-12-03 PROCEDURE — 99407 BEHAV CHNG SMOKING > 10 MIN: CPT | Mod: S$GLB,,,

## 2020-12-03 NOTE — PROGRESS NOTES
Spoke with patient today in regard to smoking cessation progress for 12-month telephone follow up on Quit 2.  Patient states she is tobacco free. Congratulated patient on her accomplishment. Informed patient of benefit period and contact information if any further help or support is needed. Will complete / resolve smart form for 12 month follow up on Quit attempt #2.

## 2020-12-03 NOTE — TELEPHONE ENCOUNTER
Attempted TC to pt to schedule appt for evaluation of colon ca per referral from Dr Ott via fax  No response  TC to spouse's phone  Message left on VM requesting he have pt contact office to schedule appointment

## 2020-12-04 ENCOUNTER — EXTERNAL HOME HEALTH (OUTPATIENT)
Dept: HOME HEALTH SERVICES | Facility: HOSPITAL | Age: 80
End: 2020-12-04

## 2020-12-04 NOTE — PROGRESS NOTES
Chief Complaint :   Colon cancer    Hx of Present illness :  Patient is a 80 y.o. year old female who presents to the clinic today for  Oncology evaluation. Presented with two week Hx of pain right flank and lower abdomen. Further studies were undertaken. Colonoscopy  At [facility]  revealed presene of cecal mass. Bx showed malignancy. Has undergone Laparoscopy  right colectomy.       Allergies :    Review of patient's allergies indicates:  No Known Allergies    Occupation :  Criminal     Transfusion :   None    Menstrual & obstetric Hx :   2; para 2.   Age of menarche:  11  Age of first pregnancy: 22  Lactation history:  None  Age of menopause:  Hysterectomy age in the 40's  HRT: None    Present Meds :   Medication List with Changes/Refills   Current Medications    ALENDRONATE (FOSAMAX) 70 MG TABLET    Take 70 mg by mouth every 7 days.    ALPRAZOLAM (XANAX) 1 MG TABLET    Take 1 mg by mouth 2 (two) times daily.    AMLODIPINE (NORVASC) 5 MG TABLET    Take 5 mg by mouth once daily.    ATORVASTATIN (LIPITOR) 10 MG TABLET    Take 20 mg by mouth once daily.     BACLOFEN (LIORESAL) 10 MG TABLET    Take 1 tablet (10 mg total) by mouth 3 (three) times daily.    CALCIUM CARBONATE (OS-JOSÉ ANTONIO) 500 MG CALCIUM (1,250 MG) TABLET    Take 1 tablet by mouth once daily.    DONEPEZIL (ARICEPT) 10 MG TABLET    Take 10 mg by mouth every evening.    ERGOCALCIFEROL (VITAMIN D2) 50,000 UNIT CAP    Take 50,000 Units by mouth every 7 days.    ESCITALOPRAM OXALATE (LEXAPRO) 10 MG TABLET    Take 10 mg by mouth once daily.    FAMOTIDINE (PEPCID) 20 MG TABLET    Take 1 tablet (20 mg total) by mouth 2 (two) times daily.    FERROUS SULFATE 325 (65 FE) MG EC TABLET    Take 1 tablet (325 mg total) by mouth once daily.    FLUOXETINE 20 MG CAPSULE    Take 20 mg by mouth once daily.    GABAPENTIN (NEURONTIN) 100 MG CAPSULE    Take 300 mg by mouth once daily.     LISINOPRIL (PRINIVIL,ZESTRIL) 40 MG TABLET    Take 40 mg by  mouth once daily.    MEMANTINE (NAMENDA) 5 MG TAB    Take 5 mg by mouth once daily.    OMEPRAZOLE (PRILOSEC) 20 MG CAPSULE    Take 20 mg by mouth once daily.    ROPINIROLE (REQUIP) 0.25 MG TABLET    Take 0.25 mg by mouth 2 (two) times daily.    TIZANIDINE (ZANAFLEX) 4 MG TABLET    Take 4 mg by mouth every 6 (six) hours as needed.       Past Medical Hx : reviewed. Totally Blind    Past Medical Hx :  Past Medical History:   Diagnosis Date    Arthritis     Cancer     Depression     GERD (gastroesophageal reflux disease)     History of radiofrequency ablation procedure for cardiac arrhythmia     Hypertension     Memory loss     Osteoporosis     Palpitations     Sleep apnea        Travel Hx :      Immunization :    There is no immunization history on file for this patient.    Family Hx :  No family history on file.    Social Hx :  Social History     Socioeconomic History    Marital status:      Spouse name: Not on file    Number of children: Not on file    Years of education: Not on file    Highest education level: Not on file   Occupational History    Not on file   Social Needs    Financial resource strain: Not on file    Food insecurity     Worry: Not on file     Inability: Not on file    Transportation needs     Medical: Not on file     Non-medical: Not on file   Tobacco Use    Smoking status: Former Smoker     Packs/day: 0.50     Types: Cigarettes     Quit date: 2019     Years since quittin.6    Smokeless tobacco: Never Used   Substance and Sexual Activity    Alcohol use: No    Drug use: No    Sexual activity: Not Currently   Lifestyle    Physical activity     Days per week: Not on file     Minutes per session: Not on file    Stress: Not on file   Relationships    Social connections     Talks on phone: Not on file     Gets together: Not on file     Attends Hindu service: Not on file     Active member of club or organization: Not on file     Attends meetings of clubs or  organizations: Not on file     Relationship status: Not on file   Other Topics Concern    Not on file   Social History Narrative    Not on file       Surgery : right Colectomy November 2020; Cystoscopy November 2020.  Hysterectomy;  Enucleation Left Eye Surgery; Left ankle Surgery; Hip Surgery; Tonsillectomy. Rotator cuff    Symptoms :    Review of Systems   Constitutional: Positive for malaise/fatigue. Negative for chills, diaphoresis, fever and weight loss.   HENT: Negative for congestion, hearing loss, nosebleeds, sore throat and tinnitus.    Eyes: Negative for blurred vision, double vision and photophobia.        Totally blind.   Respiratory: Negative for cough, hemoptysis and shortness of breath.    Cardiovascular: Negative for chest pain, palpitations, claudication and leg swelling.   Gastrointestinal: Negative for abdominal pain, blood in stool, constipation, diarrhea, heartburn, nausea and vomiting.   Genitourinary: Negative for dysuria, flank pain, frequency, hematuria and urgency.   Musculoskeletal: Positive for back pain. Negative for falls, joint pain, myalgias and neck pain.   Skin: Negative for itching and rash.   Neurological: Negative for dizziness, tingling, tremors, sensory change and headaches.   Endo/Heme/Allergies: Negative for environmental allergies. Does not bruise/bleed easily.   Psychiatric/Behavioral: Negative for depression, hallucinations and substance abuse. The patient has insomnia. The patient is not nervous/anxious.        Physical Exam :    present in the room.  Physical Exam   Constitutional: She is oriented to person, place, and time and well-developed, well-nourished, and in no distress. Vital signs are normal. No distress.   HENT:   Head: Normocephalic and atraumatic.   Right Ear: External ear normal.   Left Ear: External ear normal.   Nose: Nose normal.   Mouth/Throat: Oropharynx is clear and moist. No oropharyngeal exudate.   Eyes: Conjunctivae, EOM and lids are  normal. Lids are everted and swept, no foreign bodies found. Right eye exhibits no discharge. Left eye exhibits no discharge. No scleral icterus.       Neck: Trachea normal, normal range of motion, full passive range of motion without pain and phonation normal. Neck supple. No hepatojugular reflux and no JVD present. No tracheal tenderness present. Carotid bruit is not present. No tracheal deviation present. No thyroid mass and no thyromegaly present.   Cardiovascular: Normal rate, regular rhythm, normal heart sounds, intact distal pulses and normal pulses. PMI is not displaced. Exam reveals no gallop and no friction rub.   No murmur heard.  Pulmonary/Chest: Effort normal and breath sounds normal. No stridor. No apnea. She has no wheezes. She has no rales. She exhibits no tenderness.   Abdominal: Soft. Normal appearance, normal aorta and bowel sounds are normal. She exhibits no distension and no mass. There is no hepatosplenomegaly. There is no abdominal tenderness. There is no rebound, no guarding and no CVA tenderness. No hernia.   S/P Laparoscopic surgery   Musculoskeletal: Normal range of motion.         General: Edema present. No tenderness or deformity.      Comments: 1 + edema both legs.   Lymphadenopathy:        Head (right side): No submental, no submandibular, no tonsillar, no preauricular, no posterior auricular and no occipital adenopathy present.        Head (left side): No submental, no submandibular, no tonsillar, no preauricular, no posterior auricular and no occipital adenopathy present.     She has no cervical adenopathy.     She has no axillary adenopathy.        Right: No inguinal, no supraclavicular and no epitrochlear adenopathy present.        Left: No inguinal, no supraclavicular and no epitrochlear adenopathy present.   Neurological: She is alert and oriented to person, place, and time. She has normal motor skills, normal sensation, normal strength, normal reflexes and intact cranial nerves.  She displays normal reflexes. No cranial nerve deficit. She exhibits normal muscle tone. Coordination normal. GCS score is 15.   Skin: Skin is warm, dry and intact. No rash noted. No cyanosis or erythema. No pallor. Nails show no clubbing.   Psychiatric: Mood, memory, affect and judgment normal.   Nursing note and vitals reviewed.        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 Tumor extended thru Bowel wall and serosa to serosal surface. 2/17 nodes positive for mets.         Dx : Carcinoma Cecum pT4a,pN1b.       Assessment & Plan:  Reviewed with patient.   Reviewed with patient and her spouse. Stage 3b. Natural Hx of Colon  Cancer reviewed. Patient's presenting features explained. Patient wants to get rid of cancer. She is aware of lymph node involvement. Will Get CBC,CMP,CEA, PET/CT. Portacath to be placed by . Patient and spouse understand and verbalised.

## 2020-12-09 ENCOUNTER — OFFICE VISIT (OUTPATIENT)
Dept: HEMATOLOGY/ONCOLOGY | Facility: CLINIC | Age: 80
End: 2020-12-09
Payer: MEDICARE

## 2020-12-09 ENCOUNTER — LAB VISIT (OUTPATIENT)
Dept: LAB | Facility: HOSPITAL | Age: 80
End: 2020-12-09
Attending: INTERNAL MEDICINE
Payer: MEDICARE

## 2020-12-09 VITALS
HEART RATE: 99 BPM | HEIGHT: 66 IN | DIASTOLIC BLOOD PRESSURE: 76 MMHG | SYSTOLIC BLOOD PRESSURE: 149 MMHG | WEIGHT: 157.63 LBS | BODY MASS INDEX: 25.33 KG/M2 | OXYGEN SATURATION: 96 % | TEMPERATURE: 98 F

## 2020-12-09 DIAGNOSIS — C18.0 CARCINOMA OF CECUM: Primary | ICD-10-CM

## 2020-12-09 DIAGNOSIS — C18.0 CARCINOMA OF CECUM: ICD-10-CM

## 2020-12-09 LAB
ALBUMIN SERPL BCP-MCNC: 3.1 G/DL (ref 3.5–5.2)
ALP SERPL-CCNC: 107 U/L (ref 55–135)
ALT SERPL W/O P-5'-P-CCNC: 6 U/L (ref 10–44)
ANION GAP SERPL CALC-SCNC: 7 MMOL/L (ref 8–16)
AST SERPL-CCNC: 11 U/L (ref 10–40)
BASOPHILS # BLD AUTO: 0.03 K/UL (ref 0–0.2)
BASOPHILS NFR BLD: 0.6 % (ref 0–1.9)
BILIRUB SERPL-MCNC: 0.2 MG/DL (ref 0.1–1)
BUN SERPL-MCNC: 21 MG/DL (ref 8–23)
CALCIUM SERPL-MCNC: 9.1 MG/DL (ref 8.7–10.5)
CEA SERPL-MCNC: 3.3 NG/ML (ref 0–5)
CHLORIDE SERPL-SCNC: 108 MMOL/L (ref 95–110)
CO2 SERPL-SCNC: 31 MMOL/L (ref 23–29)
CREAT SERPL-MCNC: 0.7 MG/DL (ref 0.5–1.4)
DIFFERENTIAL METHOD: ABNORMAL
EOSINOPHIL # BLD AUTO: 0.1 K/UL (ref 0–0.5)
EOSINOPHIL NFR BLD: 1.6 % (ref 0–8)
ERYTHROCYTE [DISTWIDTH] IN BLOOD BY AUTOMATED COUNT: 16.8 % (ref 11.5–14.5)
EST. GFR  (AFRICAN AMERICAN): >60 ML/MIN/1.73 M^2
EST. GFR  (NON AFRICAN AMERICAN): >60 ML/MIN/1.73 M^2
GLUCOSE SERPL-MCNC: 105 MG/DL (ref 70–110)
HCT VFR BLD AUTO: 35.1 % (ref 37–48.5)
HGB BLD-MCNC: 10.6 G/DL (ref 12–16)
IMM GRANULOCYTES # BLD AUTO: 0.01 K/UL (ref 0–0.04)
IMM GRANULOCYTES NFR BLD AUTO: 0.2 % (ref 0–0.5)
LDH SERPL L TO P-CCNC: 201 U/L (ref 110–260)
LYMPHOCYTES # BLD AUTO: 0.9 K/UL (ref 1–4.8)
LYMPHOCYTES NFR BLD: 17.6 % (ref 18–48)
MCH RBC QN AUTO: 26.8 PG (ref 27–31)
MCHC RBC AUTO-ENTMCNC: 30.2 G/DL (ref 32–36)
MCV RBC AUTO: 89 FL (ref 82–98)
MONOCYTES # BLD AUTO: 0.3 K/UL (ref 0.3–1)
MONOCYTES NFR BLD: 6.1 % (ref 4–15)
NEUTROPHILS # BLD AUTO: 3.6 K/UL (ref 1.8–7.7)
NEUTROPHILS NFR BLD: 73.9 % (ref 38–73)
NRBC BLD-RTO: 0 /100 WBC
PLATELET # BLD AUTO: 224 K/UL (ref 150–350)
PMV BLD AUTO: 9.6 FL (ref 9.2–12.9)
POTASSIUM SERPL-SCNC: 3.3 MMOL/L (ref 3.5–5.1)
PROT SERPL-MCNC: 6.4 G/DL (ref 6–8.4)
RBC # BLD AUTO: 3.96 M/UL (ref 4–5.4)
SODIUM SERPL-SCNC: 146 MMOL/L (ref 136–145)
WBC # BLD AUTO: 4.88 K/UL (ref 3.9–12.7)

## 2020-12-09 PROCEDURE — 1159F PR MEDICATION LIST DOCUMENTED IN MEDICAL RECORD: ICD-10-PCS | Mod: S$GLB,,, | Performed by: INTERNAL MEDICINE

## 2020-12-09 PROCEDURE — 99999 PR PBB SHADOW E&M-EST. PATIENT-LVL V: CPT | Mod: PBBFAC,,, | Performed by: INTERNAL MEDICINE

## 2020-12-09 PROCEDURE — 82378 CARCINOEMBRYONIC ANTIGEN: CPT

## 2020-12-09 PROCEDURE — 1125F PR PAIN SEVERITY QUANTIFIED, PAIN PRESENT: ICD-10-PCS | Mod: S$GLB,,, | Performed by: INTERNAL MEDICINE

## 2020-12-09 PROCEDURE — 1101F PT FALLS ASSESS-DOCD LE1/YR: CPT | Mod: CPTII,S$GLB,, | Performed by: INTERNAL MEDICINE

## 2020-12-09 PROCEDURE — 3077F PR MOST RECENT SYSTOLIC BLOOD PRESSURE >= 140 MM HG: ICD-10-PCS | Mod: CPTII,S$GLB,, | Performed by: INTERNAL MEDICINE

## 2020-12-09 PROCEDURE — 1101F PR PT FALLS ASSESS DOC 0-1 FALLS W/OUT INJ PAST YR: ICD-10-PCS | Mod: CPTII,S$GLB,, | Performed by: INTERNAL MEDICINE

## 2020-12-09 PROCEDURE — 3078F DIAST BP <80 MM HG: CPT | Mod: CPTII,S$GLB,, | Performed by: INTERNAL MEDICINE

## 2020-12-09 PROCEDURE — 99205 PR OFFICE/OUTPT VISIT, NEW, LEVL V, 60-74 MIN: ICD-10-PCS | Mod: S$GLB,,, | Performed by: INTERNAL MEDICINE

## 2020-12-09 PROCEDURE — 1125F AMNT PAIN NOTED PAIN PRSNT: CPT | Mod: S$GLB,,, | Performed by: INTERNAL MEDICINE

## 2020-12-09 PROCEDURE — 83615 LACTATE (LD) (LDH) ENZYME: CPT

## 2020-12-09 PROCEDURE — 3078F PR MOST RECENT DIASTOLIC BLOOD PRESSURE < 80 MM HG: ICD-10-PCS | Mod: CPTII,S$GLB,, | Performed by: INTERNAL MEDICINE

## 2020-12-09 PROCEDURE — 3077F SYST BP >= 140 MM HG: CPT | Mod: CPTII,S$GLB,, | Performed by: INTERNAL MEDICINE

## 2020-12-09 PROCEDURE — 3288F PR FALLS RISK ASSESSMENT DOCUMENTED: ICD-10-PCS | Mod: CPTII,S$GLB,, | Performed by: INTERNAL MEDICINE

## 2020-12-09 PROCEDURE — 80053 COMPREHEN METABOLIC PANEL: CPT

## 2020-12-09 PROCEDURE — 85025 COMPLETE CBC W/AUTO DIFF WBC: CPT

## 2020-12-09 PROCEDURE — 36415 COLL VENOUS BLD VENIPUNCTURE: CPT

## 2020-12-09 PROCEDURE — 1159F MED LIST DOCD IN RCRD: CPT | Mod: S$GLB,,, | Performed by: INTERNAL MEDICINE

## 2020-12-09 PROCEDURE — 3288F FALL RISK ASSESSMENT DOCD: CPT | Mod: CPTII,S$GLB,, | Performed by: INTERNAL MEDICINE

## 2020-12-09 PROCEDURE — 99205 OFFICE O/P NEW HI 60 MIN: CPT | Mod: S$GLB,,, | Performed by: INTERNAL MEDICINE

## 2020-12-09 PROCEDURE — 99999 PR PBB SHADOW E&M-EST. PATIENT-LVL V: ICD-10-PCS | Mod: PBBFAC,,, | Performed by: INTERNAL MEDICINE

## 2020-12-09 NOTE — LETTER
December 9, 2020      Ranjit Ott III, MD  [address] [facility] [address]           [facility]  [address]  [place] [address]  Phone: [phone]          Patient: Annette Fallon   MR Number: [id number]   YOB: 1940   Date of Visit: 12/9/2020       Dear Dr. Ranjit Ott III:    Thank you for referring Annette Fallon to me for evaluation. Attached you will find relevant portions of my assessment and plan of care.    If you have questions, please do not hesitate to call me. I look forward to following Annette Fallon along with you.    Sincerely,    Khushbu Walden MD    Enclosure  CC:  No Recipients    If you would like to receive this communication electronically, please contact [email or web address] or [phone] to request more information on [email or web address] Link access.    For providers and/or their staff who would like to refer a patient to [facility], please contact us through our one-stop-shop provider referral line, [facility], at [phone].    If you feel you have received this communication in error or would no longer like to receive these types of communications, please e-mail [email or web address]

## 2020-12-15 ENCOUNTER — ANESTHESIA EVENT (OUTPATIENT)
Dept: SURGERY | Facility: HOSPITAL | Age: 80
End: 2020-12-15
Payer: MEDICARE

## 2020-12-16 ENCOUNTER — HOSPITAL ENCOUNTER (OUTPATIENT)
Dept: PREADMISSION TESTING | Facility: HOSPITAL | Age: 80
Discharge: HOME | End: 2020-12-16
Attending: SURGERY
Payer: MEDICARE

## 2020-12-16 VITALS
WEIGHT: 160.5 LBS | RESPIRATION RATE: 18 BRPM | OXYGEN SATURATION: 97 % | DIASTOLIC BLOOD PRESSURE: 82 MMHG | HEIGHT: 66 IN | SYSTOLIC BLOOD PRESSURE: 166 MMHG | BODY MASS INDEX: 25.79 KG/M2 | HEART RATE: 78 BPM | TEMPERATURE: 97 F

## 2020-12-16 DIAGNOSIS — Z01.818 PRE-OP TESTING: ICD-10-CM

## 2020-12-16 LAB — SARS-COV-2 RDRP RESP QL NAA+PROBE: NEGATIVE

## 2020-12-16 PROCEDURE — U0002 COVID-19 LAB TEST NON-CDC: HCPCS

## 2020-12-16 RX ORDER — DULOXETIN HYDROCHLORIDE 30 MG/1
30 CAPSULE, DELAYED RELEASE ORAL NIGHTLY
COMMUNITY

## 2020-12-16 RX ORDER — GLYCOPYRROLATE 1 MG/1
1 TABLET ORAL 2 TIMES DAILY
COMMUNITY

## 2020-12-16 RX ORDER — HYDROCODONE BITARTRATE AND ACETAMINOPHEN 7.5; 325 MG/1; MG/1
1 TABLET ORAL 3 TIMES DAILY
COMMUNITY
End: 2021-01-05

## 2020-12-16 RX ORDER — CHOLECALCIFEROL (VITAMIN D3) 25 MCG
1000 TABLET ORAL DAILY
COMMUNITY

## 2020-12-16 RX ORDER — HYDROXYZINE PAMOATE 50 MG/1
25 CAPSULE ORAL NIGHTLY
COMMUNITY

## 2020-12-16 RX ORDER — IBUPROFEN 800 MG/1
800 TABLET ORAL 2 TIMES DAILY
COMMUNITY

## 2020-12-16 NOTE — ANESTHESIA PREPROCEDURE EVALUATION
2020  Annette Fallon is a 80 y.o., female scheduled for INSERTION, PORT-A-CATH (N/A Chest) on 2020.    Past Medical History:   Diagnosis Date    Arthritis     Cancer     Depression     GERD (gastroesophageal reflux disease)     History of radiofrequency ablation procedure for cardiac arrhythmia     Hypertension     Memory loss     Osteoporosis     Palpitations     Sleep apnea        Past Surgical History:   Procedure Laterality Date    ANKLE SURGERY Left     APPENDECTOMY       SECTION      x2    CYSTOSCOPY WITH URETEROSCOPY, RETROGRADE PYELOGRAPHY, AND INSERTION OF STENT Bilateral 2020    Procedure: CYSTOSCOPY, WITH RETROGRADE PYELOGRAM AND URETERAL STENT INSERTION;  Surgeon: Toni Nguyen MD;  Location: [facility] OR;  Service: Urology;  Laterality: Bilateral;    EYE SURGERY Left     RETINA SURGERY OD    ENUCLEATION  OS    HIP SURGERY      HIP SURGERY Left     HYSTERECTOMY      KNEE SURGERY Left     LAPAROSCOPIC RIGHT COLON RESECTION Right 2020    Procedure: COLECTOMY, RIGHT, LAPAROSCOPIC;  Surgeon: Ranjit Ott III, MD;  Location: [facility] OR;  Service: General;  Laterality: Right;  COMBINED SURGERY WITH DR. NGUYEN  COVID NEGATIVE 20    ROTATOR CUFF REPAIR      L arm    TONSILLECTOMY           Anesthesia Evaluation    I have reviewed the Patient Summary Reports.    I have reviewed the Nursing Notes. I have reviewed the NPO Status.   I have reviewed the Medications.     Review of Systems  Anesthesia Hx:  No problems with previous Anesthesia  Denies Family Hx of Anesthesia complications.   Denies Personal Hx of Anesthesia complications.   Social:  Former Smoker    Hematology/Oncology:  Hematology Normal      Current/Recent Cancer. Oncology Comments: Malignant neoplasm of cecum    EENT/Dental:EENT/Dental Normal   Cardiovascular:   Denies  Pacemaker. Hypertension Dysrhythmias     Pulmonary:   Denies Pneumonia Denies COPD.  Denies Asthma.  Denies Shortness of breath.  Denies Recent URI. Sleep Apnea Reports does not use   Education provided regarding risk of obstructive sleep apnea     Renal/:  Renal/ Normal     Hepatic/GI:   No Bowel Prep. Denies PUD. GERD Denies Liver Disease. Denies Hepatitis.    Musculoskeletal:  Musculoskeletal Normal    Neurological:  Neurology Normal    Endocrine:  Endocrine Normal    Dermatological:  Skin Normal    Psych:   Psychiatric History          Physical Exam  General:  Well nourished    Airway/Jaw/Neck:  AIRWAY FINDINGS: Normal      Chest/Lungs:  Chest/Lungs Clear    Heart/Vascular:  Heart Findings: Normal       Mental Status:  Mental Status Findings: Normal        Anesthesia Plan  Type of Anesthesia, risks & benefits discussed:  Anesthesia Type:  MAC  Patient's Preference:   Intra-op Monitoring Plan: standard ASA monitors  Intra-op Monitoring Plan Comments:   Post Op Pain Control Plan:   Post Op Pain Control Plan Comments:   Induction:   IV  Beta Blocker:  Patient is not currently on a Beta-Blocker (No further documentation required).       Informed Consent: Patient understands risks and agrees with Anesthesia plan.  Questions answered. Anesthesia consent signed with patient.  ASA Score: 3     Day of Surgery Review of History & Physical:  There are no significant changes.  H&P update referred to the provider.         Ready For Surgery From Anesthesia Perspective.

## 2020-12-16 NOTE — DISCHARGE INSTRUCTIONS
Your procedure  is scheduled for _Thursday  Dec 17, 2020_________.    Call [phone] between 2pm and 5pm on _TODAY______to find out your arrival time for the day of surgery.    If your arrival time is earlier than 7:00 am, enter through the Emergency Department on the day of your surgery.    If your arrival time is after 7:00 am, enter at the front entrance of the hospital.    You will be going to the Same Day Surgery Unit on the 2nd floor of the hospital.    Important instructions:   Do not eat or drink after 12 midnight, including water.  It is okay to brush your teeth.                                                                                                                                                                                                                 Do not have gum, candy or mints.    SEE MEDICATION SHEET        TAKE MEDICATIONS AS DIRECTED THE AM OF SURGERY WITH A SIP OF WATER      STOP taking Aspirin, Ibuprofen,  Advil, Motrin, Mobic(meloxicam), Aleve (naproxen), Fish oil, and Vitamin E for at least 7 days before your surgery.     You may take Tylenol If needed which is not a blood thinner.       Prep instructions:    SHOWER   OTHER_____________     Please shower the night before and the morning of your surgery.      Use Hibiclens soap as instructed by your pre op nurse.   Please place clean linens on your bed the night before surgery. Please wear fresh clean clothing after each shower.     No shaving of procedural area at least 4-5 days before surgery due to increased risk of skin irritation and/or possible infection.     Do not wear make- up, including mascara.     You may wear deodorant only.      Do not wear powder, body lotion or perfume/cologne.     Do not wear any jewelry or have any metal on your body.     If you are going home on the same day of surgery, you must arrange for a family member or a friend to drive you home.  Public transportation is prohibited.  You will  not be able to drive home if you were given anesthesia or sedation.   Wear loose fitting clothes allowing for bandages.     Please leave money and valuables home.       You may bring your cell phone.     Call the doctor if fever or illness should occur before your surgery.    Call [phone] to contact us here if needed.

## 2020-12-17 ENCOUNTER — ANESTHESIA (OUTPATIENT)
Dept: SURGERY | Facility: HOSPITAL | Age: 80
End: 2020-12-17
Payer: MEDICARE

## 2020-12-17 ENCOUNTER — HOSPITAL ENCOUNTER (OUTPATIENT)
Facility: HOSPITAL | Age: 80
Discharge: HOME | End: 2020-12-17
Attending: SURGERY | Admitting: SURGERY
Payer: MEDICARE

## 2020-12-17 VITALS
TEMPERATURE: 98 F | WEIGHT: 160.5 LBS | DIASTOLIC BLOOD PRESSURE: 74 MMHG | RESPIRATION RATE: 16 BRPM | OXYGEN SATURATION: 95 % | HEART RATE: 71 BPM | SYSTOLIC BLOOD PRESSURE: 176 MMHG | BODY MASS INDEX: 25.9 KG/M2

## 2020-12-17 DIAGNOSIS — C18.0 MALIGNANT NEOPLASM OF CECUM: ICD-10-CM

## 2020-12-17 DIAGNOSIS — C18.0 CARCINOMA OF CECUM: ICD-10-CM

## 2020-12-17 DIAGNOSIS — Z01.818 PRE-OP TESTING: Primary | ICD-10-CM

## 2020-12-17 PROCEDURE — D9220A PRA ANESTHESIA: Mod: ANES,,, | Performed by: ANESTHESIOLOGY

## 2020-12-17 PROCEDURE — 63600175 PHARM REV CODE 636 W HCPCS: Performed by: NURSE ANESTHETIST, CERTIFIED REGISTERED

## 2020-12-17 PROCEDURE — D9220A PRA ANESTHESIA: ICD-10-PCS | Mod: ANES,,, | Performed by: ANESTHESIOLOGY

## 2020-12-17 PROCEDURE — 36000706: Performed by: SURGERY

## 2020-12-17 PROCEDURE — 71000015 HC POSTOP RECOV 1ST HR: Performed by: SURGERY

## 2020-12-17 PROCEDURE — 63600175 PHARM REV CODE 636 W HCPCS: Performed by: SURGERY

## 2020-12-17 PROCEDURE — 25000003 PHARM REV CODE 250: Performed by: NURSE ANESTHETIST, CERTIFIED REGISTERED

## 2020-12-17 PROCEDURE — D9220A PRA ANESTHESIA: Mod: CRNA,,, | Performed by: NURSE ANESTHETIST, CERTIFIED REGISTERED

## 2020-12-17 PROCEDURE — D9220A PRA ANESTHESIA: ICD-10-PCS | Mod: CRNA,,, | Performed by: NURSE ANESTHETIST, CERTIFIED REGISTERED

## 2020-12-17 PROCEDURE — 36000707: Performed by: SURGERY

## 2020-12-17 PROCEDURE — 25000003 PHARM REV CODE 250: Performed by: SURGERY

## 2020-12-17 PROCEDURE — C1788 PORT, INDWELLING, IMP: HCPCS | Performed by: SURGERY

## 2020-12-17 PROCEDURE — 71000016 HC POSTOP RECOV ADDL HR: Performed by: SURGERY

## 2020-12-17 PROCEDURE — 25000003 PHARM REV CODE 250: Performed by: ANESTHESIOLOGY

## 2020-12-17 PROCEDURE — 37000008 HC ANESTHESIA 1ST 15 MINUTES: Performed by: SURGERY

## 2020-12-17 PROCEDURE — 37000009 HC ANESTHESIA EA ADD 15 MINS: Performed by: SURGERY

## 2020-12-17 DEVICE — SHUNT LIFEPORT 8.0FR PLASTIC: Type: IMPLANTABLE DEVICE | Site: CHEST | Status: FUNCTIONAL

## 2020-12-17 RX ORDER — PROPOFOL 10 MG/ML
VIAL (ML) INTRAVENOUS
Status: DISCONTINUED | OUTPATIENT
Start: 2020-12-17 | End: 2020-12-17

## 2020-12-17 RX ORDER — ONDANSETRON 2 MG/ML
INJECTION INTRAMUSCULAR; INTRAVENOUS
Status: DISCONTINUED | OUTPATIENT
Start: 2020-12-17 | End: 2020-12-17

## 2020-12-17 RX ORDER — SODIUM CHLORIDE 9 MG/ML
INJECTION, SOLUTION INTRAVENOUS CONTINUOUS
Status: DISCONTINUED | OUTPATIENT
Start: 2020-12-17 | End: 2020-12-17 | Stop reason: HOSPADM

## 2020-12-17 RX ORDER — FENTANYL CITRATE 50 UG/ML
INJECTION, SOLUTION INTRAMUSCULAR; INTRAVENOUS
Status: DISCONTINUED | OUTPATIENT
Start: 2020-12-17 | End: 2020-12-17

## 2020-12-17 RX ORDER — PROPOFOL 10 MG/ML
VIAL (ML) INTRAVENOUS CONTINUOUS PRN
Status: DISCONTINUED | OUTPATIENT
Start: 2020-12-17 | End: 2020-12-17

## 2020-12-17 RX ORDER — HYDROCODONE BITARTRATE AND ACETAMINOPHEN 10; 325 MG/1; MG/1
1 TABLET ORAL EVERY 4 HOURS PRN
Status: DISCONTINUED | OUTPATIENT
Start: 2020-12-17 | End: 2020-12-17 | Stop reason: HOSPADM

## 2020-12-17 RX ORDER — CLINDAMYCIN PHOSPHATE 300 MG/50ML
300 INJECTION, SOLUTION INTRAVENOUS ONCE
Status: DISCONTINUED | OUTPATIENT
Start: 2020-12-17 | End: 2020-12-17

## 2020-12-17 RX ORDER — LIDOCAINE HYDROCHLORIDE 10 MG/ML
1 INJECTION, SOLUTION EPIDURAL; INFILTRATION; INTRACAUDAL; PERINEURAL ONCE
Status: COMPLETED | OUTPATIENT
Start: 2020-12-17 | End: 2020-12-17

## 2020-12-17 RX ORDER — CLINDAMYCIN PHOSPHATE 600 MG/50ML
600 INJECTION, SOLUTION INTRAVENOUS ONCE
Status: COMPLETED | OUTPATIENT
Start: 2020-12-17 | End: 2020-12-17

## 2020-12-17 RX ORDER — LIDOCAINE HYDROCHLORIDE 10 MG/ML
INJECTION INFILTRATION; PERINEURAL
Status: DISCONTINUED | OUTPATIENT
Start: 2020-12-17 | End: 2020-12-17 | Stop reason: HOSPADM

## 2020-12-17 RX ORDER — LIDOCAINE HYDROCHLORIDE 20 MG/ML
INJECTION INTRAVENOUS
Status: DISCONTINUED | OUTPATIENT
Start: 2020-12-17 | End: 2020-12-17

## 2020-12-17 RX ORDER — ACETAMINOPHEN 500 MG
1000 TABLET ORAL
Status: COMPLETED | OUTPATIENT
Start: 2020-12-17 | End: 2020-12-17

## 2020-12-17 RX ORDER — HEPARIN SODIUM 1000 [USP'U]/ML
INJECTION, SOLUTION INTRAVENOUS; SUBCUTANEOUS
Status: DISCONTINUED | OUTPATIENT
Start: 2020-12-17 | End: 2020-12-17 | Stop reason: HOSPADM

## 2020-12-17 RX ADMIN — FENTANYL CITRATE 50 MCG: 50 INJECTION INTRAMUSCULAR; INTRAVENOUS at 11:12

## 2020-12-17 RX ADMIN — ACETAMINOPHEN 1000 MG: 500 TABLET ORAL at 10:12

## 2020-12-17 RX ADMIN — Medication 40 MG: at 11:12

## 2020-12-17 RX ADMIN — FENTANYL CITRATE 25 MCG: 50 INJECTION INTRAMUSCULAR; INTRAVENOUS at 11:12

## 2020-12-17 RX ADMIN — PROPOFOL 80 MCG/KG/MIN: 10 INJECTION, EMULSION INTRAVENOUS at 11:12

## 2020-12-17 RX ADMIN — PROPOFOL 20 MG: 10 INJECTION, EMULSION INTRAVENOUS at 11:12

## 2020-12-17 RX ADMIN — LIDOCAINE HYDROCHLORIDE 0.1 MG: 10 INJECTION, SOLUTION EPIDURAL; INFILTRATION; INTRACAUDAL; PERINEURAL at 10:12

## 2020-12-17 RX ADMIN — HYDROCODONE BITARTRATE AND ACETAMINOPHEN 1 TABLET: 10; 325 TABLET ORAL at 01:12

## 2020-12-17 RX ADMIN — SODIUM CHLORIDE 10 ML/HR: 0.9 INJECTION, SOLUTION INTRAVENOUS at 10:12

## 2020-12-17 RX ADMIN — CLINDAMYCIN IN 5 PERCENT DEXTROSE 600 MG: 12 INJECTION, SOLUTION INTRAVENOUS at 11:12

## 2020-12-17 RX ADMIN — ONDANSETRON 4 MG: 2 INJECTION, SOLUTION INTRAMUSCULAR; INTRAVENOUS at 11:12

## 2020-12-17 NOTE — TRANSFER OF CARE
Anesthesia Transfer of Care Note    Patient: Annette Fallon    Procedure(s) Performed: Procedure(s) (LRB):  INSERTION, PORT-A-CATH (N/A)    Patient location: [facility]    Anesthesia Type: MAC    Transport from OR: Transported from OR on room air with adequate spontaneous ventilation    Post pain: adequate analgesia    Post assessment: no apparent anesthetic complications and tolerated procedure well    Post vital signs: stable    Level of consciousness: awake, alert and oriented    Nausea/Vomiting: no nausea/vomiting    Complications: none    Transfer of care protocol was followed      Last vitals:   Visit Vitals  BP (!) 161/73 (BP Location: Right arm)   Pulse 75   Temp 36.9 °C (98.4 °F) (Oral)   Resp 17   Wt 72.8 kg (160 lb 7.9 oz)   SpO2 (!) 94%   Breastfeeding No   BMI 25.90 kg/m²

## 2020-12-17 NOTE — DISCHARGE INSTRUCTIONS
DIET: You may resume your home diet. If nausea is present, increase your diet gradually with fluids and bland foods    ACTIVITY LEVEL: You have received sedation or an anesthetic, you may feel sleepy for several hours. Rest until you are more awake. Gradually resume your normal activities    Medications: Pain medication should be taken only if needed and as directed. If antibiotics are prescribed, the medication should be taken until completed.     No driving, alcoholic beverages or signing legal documents for next 24 hours or while taking pain medication.      Dressing: removed in 48 hours,Then wash incision each day with soap and water, allow steri strips to fall off on own    CALL THE DOCTOR:    For any obvious bleeding (some dried blood over the incision is normal).      Redness, swelling, foul smell around incision or fever over 101.   Shortness of breath, Coughing up Bloody sputum, Pains or Swelling in your Calves .   Persistent pain or nausea not relieved by medication.    If any unusual problems or difficulties occur contact your doctor. If you cannot contact your doctor but feel your signs and symptoms warrant a physicians attention return to the emergency room.    Fall Prevention  Millions of people fall every year and injure themselves. You may have had anesthesia or sedation which may increase your risk of falling. You may have health issues that put you at an increased risk of falling.     Here are ways to reduce your risk of falling.  ·   · Make your home safe by keeping walkways clear of objects you may trip over.  · Use non-slip pads under rugs. Do not use area rugs or small throw rugs.  · Use non-slip mats in bathtubs and showers.  · Install handrails and lights on staircases.  · Do not walk in poorly lit areas.  · Do not stand on chairs or wobbly ladders.  · Use caution when reaching overhead or looking upward. This position can cause a loss of balance.  · Be sure your shoes fit properly, have  non-slip bottoms and are in good condition.   · Wear shoes both inside and out. Avoid going barefoot or wearing slippers.  · Be cautious when going up and down stairs, curbs, and when walking on uneven sidewalks.  · If your balance is poor, consider using a cane or walker.  · If your fall was related to alcohol use, stop or limit alcohol intake.   · If your fall was related to use of sleeping medicines, talk to your doctor about this. You may need to reduce your dosage at bedtime if you awaken during the night to go to the bathroom.    · To reduce the need for nighttime bathroom trips:  ¨ Avoid drinking fluids for several hours before going to bed  ¨ Empty your bladder before going to bed  ¨ Men can keep a urinal at the bedside  · Stay as active as you can. Balance, flexibility, strength, and endurance all come from exercise. They all play a role in preventing falls. Ask your healthcare provider which types of activity are right for you.  · Get your vision checked on a regular basis.  · If you have pets, know where they are before you stand up or walk so you don't trip over them.  · Use night lights.

## 2020-12-17 NOTE — OP NOTE
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  Placed on the operative table in the supine position with a shoulder roll.  The bilateral neck and chest were prepped and draped in a sterile fashion.  Attention was 1st turned towards the right upper chest.  9 cc of 1% lidocaine with epinephrine were utilized for local anesthesia.  The right subclavian vein was easily cannulated using the Seldinger technique.  The guidewire was threaded into the central venous circulation.  This was confirmed with fluoroscopy.  Next a linear incision was made on the right upper chest through the exit site of the wire.  Electrocautery was used to dissect through the subcutaneous tissue down to the fascia and a pocket was created inferiorly with blunt dissection and cautery.  There was no bleeding.  Next the dilator and sheath were placed over the guidewire.  The guidewire and dilator were removed.  The catheter was threaded down the sheath.  The sheath was peeled away.  Using fluoroscopy the tip of the catheter was positioned in the superior vena cava.  Static images were taken of this and the right chest showing there was no right pneumothorax.  The catheter was trimmed to the appropriate length and attached to the port. This was placed within the pocket which was secured to the pectoralis fascia into place with a 2 Vicryl suture.  Port was easily  palpable.  It was accessed and aspirated dark venous nonpulsatile blood and was flushed with heparinized saline  Found to be hemosttic, closed w3 0 and 4 0 vicryl    Description of the Findings of the Procedure:  Port placement without incident    Significant Surgical Tasks Conducted by the Assistant(s), if Applicable:     Complications: No    Estimated Blood Loss (EBL): * No values recorded between 12/17/2020 11:33 AM and 12/17/2020 11:57 AM *           Implants:   Implant Name Type Inv. Item Serial No.  Lot No. LRB No. Used Action   [employer] 8.0FR PLASTIC - [id number]  [employer] 8.0FR PLASTIC  [employer] [id number] N/A 1 Implanted       Specimens:   Specimen (12h ago, onward)    None                  Condition: Good    Disposition: PACU - hemodynamically stable.    Attestation: I performed the procedure.    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     atorvastatin 10 MG tablet  Commonly known as: LIPITOR  Take 20 mg by mouth once daily.     calcium carbonate 500 mg calcium (1,250 mg) tablet  Commonly known as: OS-JOSÉ ANTONIO  Take 1 tablet by mouth once daily.     donepeziL 10 MG tablet  Commonly known as: ARICEPT  Take 10 mg by mouth every evening.     DULoxetine 30 MG capsule  Commonly known as: CYMBALTA  Take 30 mg by mouth nightly.     escitalopram oxalate 10 MG tablet  Commonly known as: LEXAPRO  Take 10 mg by  mouth once daily.     famotidine 20 MG tablet  Commonly known as: PEPCID  Take 1 tablet (20 mg total) by mouth 2 (two) times daily.     ferrous sulfate 325 (65 FE) MG EC tablet  Take 1 tablet (325 mg total) by mouth once daily.     gabapentin 100 MG capsule  Commonly known as: NEURONTIN  Take 300 mg by mouth 3 (three) times daily.     glycopyrrolate 1 mg Tab  Commonly known as: ROBINUL  Take 1 mg by mouth 2 (two) times daily.     HYDROcodone-acetaminophen 7.5-325 mg per tablet  Commonly known as: NORCO  Take 1 tablet by mouth 3 (three) times daily.     hydrOXYzine pamoate 50 MG Cap  Commonly known as: VISTARIL  Take 25 mg by mouth every evening.     ibuprofen 800 MG tablet  Commonly known as: ADVIL,MOTRIN  Take 800 mg by mouth 2 (two) times daily.     lisinopriL 40 MG tablet  Commonly known as: PRINIVIL,ZESTRIL  Take 40 mg by mouth once daily.     memantine 5 MG Tab  Commonly known as: NAMENDA  Take 5 mg by mouth once daily.     rOPINIRole 0.25 MG tablet  Commonly known as: REQUIP  Take 0.25 mg by mouth 2 (two) times daily.     vitamin D 1000 units Tab  Commonly known as: VITAMIN D3  Take 1,000 Units by mouth once daily.     VITAMIN D2 50,000 unit Cap  Generic drug: ergocalciferol  Take 50,000 Units by mouth every 7 days.          No discharge procedures on file.

## 2020-12-28 NOTE — ANESTHESIA POSTPROCEDURE EVALUATION
Anesthesia Post Evaluation    Patient: Annette Fallon    Procedure(s) Performed: Procedure(s) (LRB):  INSERTION, PORT-A-CATH (N/A)    Final Anesthesia Type: MAC      Patient location during evaluation: [facility]  Patient participation: Yes- Able to Participate  Level of consciousness: awake and alert  Post-procedure vital signs: reviewed and stable  Pain management: adequate  Airway patency: patent    PONV status at discharge: No PONV  Anesthetic complications: no      Cardiovascular status: blood pressure returned to baseline and hemodynamically stable  Respiratory status: unassisted and spontaneous ventilation  Hydration status: euvolemic  Follow-up not needed.          Vitals Value Taken Time   /74 12/17/20 1500   Temp 36.6 °C (97.8 °F) 12/17/20 1500   Pulse 71 12/17/20 1500   Resp 16 12/17/20 1500   SpO2 95 % 12/17/20 1500         No case tracking events are documented in the log.      Pain/Jovan Score: No data recorded

## 2020-12-31 ENCOUNTER — HOSPITAL ENCOUNTER (OUTPATIENT)
Dept: RADIOLOGY | Facility: HOSPITAL | Age: 80
Discharge: HOME | End: 2020-12-31
Attending: INTERNAL MEDICINE
Payer: MEDICARE

## 2020-12-31 DIAGNOSIS — C18.0 CARCINOMA OF CECUM: ICD-10-CM

## 2020-12-31 PROCEDURE — 78815 PET IMAGE W/CT SKULL-THIGH: CPT | Mod: 26,PI,, | Performed by: RADIOLOGY

## 2020-12-31 PROCEDURE — 78815 PET IMAGE W/CT SKULL-THIGH: CPT | Mod: TC,PI

## 2020-12-31 PROCEDURE — 78815 NM PET CT ROUTINE: ICD-10-PCS | Mod: 26,PI,, | Performed by: RADIOLOGY

## 2021-01-05 ENCOUNTER — HOSPITAL ENCOUNTER (EMERGENCY)
Facility: HOSPITAL | Age: 81
Discharge: HOME | End: 2021-01-05
Attending: EMERGENCY MEDICINE
Payer: MEDICARE

## 2021-01-05 VITALS
TEMPERATURE: 98 F | HEIGHT: 66 IN | HEART RATE: 78 BPM | DIASTOLIC BLOOD PRESSURE: 70 MMHG | RESPIRATION RATE: 16 BRPM | BODY MASS INDEX: 26.2 KG/M2 | SYSTOLIC BLOOD PRESSURE: 159 MMHG | OXYGEN SATURATION: 95 % | WEIGHT: 163 LBS

## 2021-01-05 DIAGNOSIS — S52.532A CLOSED COLLES' FRACTURE OF LEFT RADIUS, INITIAL ENCOUNTER: Primary | ICD-10-CM

## 2021-01-05 DIAGNOSIS — R52 PAIN: ICD-10-CM

## 2021-01-05 PROCEDURE — 96372 THER/PROPH/DIAG INJ SC/IM: CPT

## 2021-01-05 PROCEDURE — 99284 EMERGENCY DEPT VISIT MOD MDM: CPT | Mod: 25

## 2021-01-05 PROCEDURE — 25605 CLTX DST RDL FX/EPHYS SEP W/: CPT | Mod: 59,LT

## 2021-01-05 PROCEDURE — 63600175 PHARM REV CODE 636 W HCPCS: Performed by: EMERGENCY MEDICINE

## 2021-01-05 PROCEDURE — 25000003 PHARM REV CODE 250: Performed by: EMERGENCY MEDICINE

## 2021-01-05 RX ORDER — HYDROCODONE BITARTRATE AND ACETAMINOPHEN 10; 325 MG/1; MG/1
1 TABLET ORAL EVERY 4 HOURS PRN
Qty: 18 TABLET | Refills: 0 | Status: SHIPPED | OUTPATIENT
Start: 2021-01-05

## 2021-01-05 RX ORDER — HYDROMORPHONE HYDROCHLORIDE 2 MG/ML
1 INJECTION, SOLUTION INTRAMUSCULAR; INTRAVENOUS; SUBCUTANEOUS
Status: COMPLETED | OUTPATIENT
Start: 2021-01-05 | End: 2021-01-05

## 2021-01-05 RX ORDER — HYDROCODONE BITARTRATE AND ACETAMINOPHEN 10; 325 MG/1; MG/1
1 TABLET ORAL
Status: COMPLETED | OUTPATIENT
Start: 2021-01-05 | End: 2021-01-05

## 2021-01-05 RX ORDER — LIDOCAINE HYDROCHLORIDE 20 MG/ML
10 INJECTION, SOLUTION INFILTRATION; PERINEURAL
Status: COMPLETED | OUTPATIENT
Start: 2021-01-05 | End: 2021-01-05

## 2021-01-05 RX ADMIN — LIDOCAINE HYDROCHLORIDE 10 ML: 20 INJECTION, SOLUTION INFILTRATION; PERINEURAL at 04:01

## 2021-01-05 RX ADMIN — HYDROCODONE BITARTRATE AND ACETAMINOPHEN 1 TABLET: 10; 325 TABLET ORAL at 03:01

## 2021-01-05 RX ADMIN — HYDROMORPHONE HYDROCHLORIDE 1 MG: 2 INJECTION, SOLUTION INTRAMUSCULAR; INTRAVENOUS; SUBCUTANEOUS at 05:01

## 2021-01-19 ENCOUNTER — OFFICE VISIT (OUTPATIENT)
Dept: HEMATOLOGY/ONCOLOGY | Facility: CLINIC | Age: 81
End: 2021-01-19
Payer: MEDICARE

## 2021-01-19 VITALS
HEIGHT: 66 IN | SYSTOLIC BLOOD PRESSURE: 115 MMHG | TEMPERATURE: 98 F | HEART RATE: 94 BPM | BODY MASS INDEX: 26.31 KG/M2 | DIASTOLIC BLOOD PRESSURE: 57 MMHG | OXYGEN SATURATION: 95 %

## 2021-01-19 DIAGNOSIS — C18.0 CARCINOMA OF CECUM: Primary | ICD-10-CM

## 2021-01-19 PROCEDURE — 3078F PR MOST RECENT DIASTOLIC BLOOD PRESSURE < 80 MM HG: ICD-10-PCS | Mod: CPTII,S$GLB,, | Performed by: INTERNAL MEDICINE

## 2021-01-19 PROCEDURE — 99215 OFFICE O/P EST HI 40 MIN: CPT | Mod: S$GLB,,, | Performed by: INTERNAL MEDICINE

## 2021-01-19 PROCEDURE — 3074F SYST BP LT 130 MM HG: CPT | Mod: CPTII,S$GLB,, | Performed by: INTERNAL MEDICINE

## 2021-01-19 PROCEDURE — 1125F AMNT PAIN NOTED PAIN PRSNT: CPT | Mod: S$GLB,,, | Performed by: INTERNAL MEDICINE

## 2021-01-19 PROCEDURE — 3288F FALL RISK ASSESSMENT DOCD: CPT | Mod: CPTII,S$GLB,, | Performed by: INTERNAL MEDICINE

## 2021-01-19 PROCEDURE — 1159F MED LIST DOCD IN RCRD: CPT | Mod: S$GLB,,, | Performed by: INTERNAL MEDICINE

## 2021-01-19 PROCEDURE — 3288F PR FALLS RISK ASSESSMENT DOCUMENTED: ICD-10-PCS | Mod: CPTII,S$GLB,, | Performed by: INTERNAL MEDICINE

## 2021-01-19 PROCEDURE — 99215 PR OFFICE/OUTPT VISIT, EST, LEVL V, 40-54 MIN: ICD-10-PCS | Mod: S$GLB,,, | Performed by: INTERNAL MEDICINE

## 2021-01-19 PROCEDURE — 1100F PTFALLS ASSESS-DOCD GE2>/YR: CPT | Mod: CPTII,S$GLB,, | Performed by: INTERNAL MEDICINE

## 2021-01-19 PROCEDURE — 1159F PR MEDICATION LIST DOCUMENTED IN MEDICAL RECORD: ICD-10-PCS | Mod: S$GLB,,, | Performed by: INTERNAL MEDICINE

## 2021-01-19 PROCEDURE — 3078F DIAST BP <80 MM HG: CPT | Mod: CPTII,S$GLB,, | Performed by: INTERNAL MEDICINE

## 2021-01-19 PROCEDURE — 1125F PR PAIN SEVERITY QUANTIFIED, PAIN PRESENT: ICD-10-PCS | Mod: S$GLB,,, | Performed by: INTERNAL MEDICINE

## 2021-01-19 PROCEDURE — 99999 PR PBB SHADOW E&M-EST. PATIENT-LVL IV: ICD-10-PCS | Mod: PBBFAC,,, | Performed by: INTERNAL MEDICINE

## 2021-01-19 PROCEDURE — 3074F PR MOST RECENT SYSTOLIC BLOOD PRESSURE < 130 MM HG: ICD-10-PCS | Mod: CPTII,S$GLB,, | Performed by: INTERNAL MEDICINE

## 2021-01-19 PROCEDURE — 1100F PR PT FALLS ASSESS DOC 2+ FALLS/FALL W/INJURY/YR: ICD-10-PCS | Mod: CPTII,S$GLB,, | Performed by: INTERNAL MEDICINE

## 2021-01-19 PROCEDURE — 99999 PR PBB SHADOW E&M-EST. PATIENT-LVL IV: CPT | Mod: PBBFAC,,, | Performed by: INTERNAL MEDICINE

## 2021-01-19 RX ORDER — EPINEPHRINE 0.3 MG/.3ML
0.3 INJECTION SUBCUTANEOUS ONCE AS NEEDED
Status: CANCELLED | OUTPATIENT
Start: 2021-02-02 | End: 2032-07-01

## 2021-01-19 RX ORDER — SODIUM CHLORIDE 0.9 % (FLUSH) 0.9 %
10 SYRINGE (ML) INJECTION
Status: CANCELLED | OUTPATIENT
Start: 2021-02-02

## 2021-01-19 RX ORDER — HEPARIN 100 UNIT/ML
500 SYRINGE INTRAVENOUS
Status: CANCELLED | OUTPATIENT
Start: 2021-02-04

## 2021-01-19 RX ORDER — DIPHENHYDRAMINE HYDROCHLORIDE 50 MG/ML
50 INJECTION INTRAMUSCULAR; INTRAVENOUS ONCE AS NEEDED
Status: CANCELLED | OUTPATIENT
Start: 2021-02-02 | End: 2032-07-01

## 2021-01-19 RX ORDER — SODIUM CHLORIDE 0.9 % (FLUSH) 0.9 %
10 SYRINGE (ML) INJECTION
Status: CANCELLED | OUTPATIENT
Start: 2021-02-04

## 2021-01-19 RX ORDER — HEPARIN 100 UNIT/ML
500 SYRINGE INTRAVENOUS
Status: CANCELLED | OUTPATIENT
Start: 2021-02-02

## 2021-01-19 RX ORDER — FLUOROURACIL 50 MG/ML
400 INJECTION, SOLUTION INTRAVENOUS
Status: CANCELLED | OUTPATIENT
Start: 2021-02-02

## 2021-02-02 ENCOUNTER — INFUSION (OUTPATIENT)
Dept: INFUSION THERAPY | Facility: HOSPITAL | Age: 81
End: 2021-02-02
Attending: INTERNAL MEDICINE
Payer: MEDICARE

## 2021-02-02 VITALS
TEMPERATURE: 98 F | DIASTOLIC BLOOD PRESSURE: 75 MMHG | SYSTOLIC BLOOD PRESSURE: 187 MMHG | OXYGEN SATURATION: 97 % | WEIGHT: 174.81 LBS | HEIGHT: 66 IN | HEART RATE: 78 BPM | RESPIRATION RATE: 18 BRPM | BODY MASS INDEX: 28.09 KG/M2

## 2021-02-02 DIAGNOSIS — C18.0 MALIGNANT NEOPLASM OF CECUM: Primary | ICD-10-CM

## 2021-02-02 PROCEDURE — 96368 THER/DIAG CONCURRENT INF: CPT

## 2021-02-02 PROCEDURE — 96411 CHEMO IV PUSH ADDL DRUG: CPT

## 2021-02-02 PROCEDURE — 96413 CHEMO IV INFUSION 1 HR: CPT

## 2021-02-02 PROCEDURE — 25000003 PHARM REV CODE 250: Performed by: INTERNAL MEDICINE

## 2021-02-02 PROCEDURE — 63600175 PHARM REV CODE 636 W HCPCS: Performed by: INTERNAL MEDICINE

## 2021-02-02 PROCEDURE — 96367 TX/PROPH/DG ADDL SEQ IV INF: CPT

## 2021-02-02 PROCEDURE — 96415 CHEMO IV INFUSION ADDL HR: CPT

## 2021-02-02 PROCEDURE — 96416 CHEMO PROLONG INFUSE W/PUMP: CPT

## 2021-02-02 RX ORDER — FLUOROURACIL 50 MG/ML
400 INJECTION, SOLUTION INTRAVENOUS
Status: COMPLETED | OUTPATIENT
Start: 2021-02-02 | End: 2021-02-02

## 2021-02-02 RX ADMIN — LEUCOVORIN CALCIUM 770 MG: 500 INJECTION, POWDER, LYOPHILIZED, FOR SOLUTION INTRAMUSCULAR; INTRAVENOUS at 09:02

## 2021-02-02 RX ADMIN — FLUOROURACIL 4610 MG: 50 INJECTION, SOLUTION INTRAVENOUS at 01:02

## 2021-02-02 RX ADMIN — OXALIPLATIN 163 MG: 100 INJECTION, SOLUTION INTRAVENOUS at 09:02

## 2021-02-02 RX ADMIN — DEXAMETHASONE SODIUM PHOSPHATE: 10 INJECTION, SOLUTION INTRAMUSCULAR; INTRAVENOUS at 09:02

## 2021-02-02 RX ADMIN — FLUOROURACIL 770 MG: 50 INJECTION, SOLUTION INTRAVENOUS at 01:02

## 2021-02-04 ENCOUNTER — INFUSION (OUTPATIENT)
Dept: INFUSION THERAPY | Facility: HOSPITAL | Age: 81
End: 2021-02-04
Attending: INTERNAL MEDICINE
Payer: MEDICARE

## 2021-02-04 ENCOUNTER — DOCUMENTATION ONLY (OUTPATIENT)
Dept: HEMATOLOGY/ONCOLOGY | Facility: CLINIC | Age: 81
End: 2021-02-04

## 2021-02-04 VITALS
HEART RATE: 77 BPM | OXYGEN SATURATION: 95 % | DIASTOLIC BLOOD PRESSURE: 58 MMHG | SYSTOLIC BLOOD PRESSURE: 119 MMHG | TEMPERATURE: 97 F | RESPIRATION RATE: 17 BRPM

## 2021-02-04 DIAGNOSIS — C18.0 MALIGNANT NEOPLASM OF CECUM: Primary | ICD-10-CM

## 2021-02-04 DIAGNOSIS — C18.0 CARCINOMA OF CECUM: Primary | ICD-10-CM

## 2021-02-04 PROCEDURE — 96523 IRRIG DRUG DELIVERY DEVICE: CPT

## 2021-02-04 PROCEDURE — 63600175 PHARM REV CODE 636 W HCPCS: Performed by: INTERNAL MEDICINE

## 2021-02-04 PROCEDURE — 25000003 PHARM REV CODE 250: Performed by: INTERNAL MEDICINE

## 2021-02-04 PROCEDURE — A4216 STERILE WATER/SALINE, 10 ML: HCPCS | Performed by: INTERNAL MEDICINE

## 2021-02-04 RX ORDER — ONDANSETRON 4 MG/1
4 TABLET, FILM COATED ORAL DAILY PRN
Qty: 30 TABLET | Refills: 1 | Status: SHIPPED | OUTPATIENT
Start: 2021-02-04 | End: 2022-01-01

## 2021-02-04 RX ORDER — SODIUM CHLORIDE 0.9 % (FLUSH) 0.9 %
10 SYRINGE (ML) INJECTION
Status: DISCONTINUED | OUTPATIENT
Start: 2021-02-04 | End: 2021-02-04 | Stop reason: HOSPADM

## 2021-02-04 RX ORDER — HEPARIN 100 UNIT/ML
500 SYRINGE INTRAVENOUS
Status: DISCONTINUED | OUTPATIENT
Start: 2021-02-04 | End: 2021-02-04 | Stop reason: HOSPADM

## 2021-02-04 RX ADMIN — Medication 10 ML: at 12:02

## 2021-02-04 RX ADMIN — HEPARIN 500 UNITS: 100 SYRINGE at 12:02

## 2021-02-05 DIAGNOSIS — C18.0 CARCINOMA OF CECUM: Primary | ICD-10-CM

## 2021-02-08 ENCOUNTER — DOCUMENTATION ONLY (OUTPATIENT)
Dept: HEMATOLOGY/ONCOLOGY | Facility: CLINIC | Age: 81
End: 2021-02-08

## 2021-02-09 DIAGNOSIS — C18.0 CARCINOMA OF CECUM: Primary | ICD-10-CM

## 2021-02-09 PROCEDURE — G0180 MD CERTIFICATION HHA PATIENT: HCPCS | Mod: ,,, | Performed by: INTERNAL MEDICINE

## 2021-02-09 PROCEDURE — G0180 PR HOME HEALTH MD CERTIFICATION: ICD-10-PCS | Mod: ,,, | Performed by: INTERNAL MEDICINE

## 2021-02-17 ENCOUNTER — LAB VISIT (OUTPATIENT)
Dept: LAB | Facility: HOSPITAL | Age: 81
End: 2021-02-17
Attending: INTERNAL MEDICINE
Payer: MEDICARE

## 2021-02-17 ENCOUNTER — OFFICE VISIT (OUTPATIENT)
Dept: HEMATOLOGY/ONCOLOGY | Facility: CLINIC | Age: 81
End: 2021-02-17
Payer: MEDICARE

## 2021-02-17 VITALS
HEIGHT: 66 IN | HEART RATE: 84 BPM | DIASTOLIC BLOOD PRESSURE: 76 MMHG | TEMPERATURE: 99 F | OXYGEN SATURATION: 94 % | BODY MASS INDEX: 28.22 KG/M2 | SYSTOLIC BLOOD PRESSURE: 155 MMHG

## 2021-02-17 DIAGNOSIS — T45.1X5A CHEMOTHERAPY-INDUCED NEUTROPENIA: ICD-10-CM

## 2021-02-17 DIAGNOSIS — C18.0 CARCINOMA OF CECUM: ICD-10-CM

## 2021-02-17 DIAGNOSIS — D70.1 CHEMOTHERAPY-INDUCED NEUTROPENIA: ICD-10-CM

## 2021-02-17 DIAGNOSIS — C18.0 CARCINOMA OF CECUM: Primary | ICD-10-CM

## 2021-02-17 DIAGNOSIS — Z09 CHEMOTHERAPY FOLLOW-UP EXAMINATION: ICD-10-CM

## 2021-02-17 LAB
ALBUMIN SERPL BCP-MCNC: 2.8 G/DL (ref 3.5–5.2)
ALP SERPL-CCNC: 114 U/L (ref 55–135)
ALT SERPL W/O P-5'-P-CCNC: 12 U/L (ref 10–44)
ANION GAP SERPL CALC-SCNC: 12 MMOL/L (ref 8–16)
AST SERPL-CCNC: 19 U/L (ref 10–40)
BASOPHILS # BLD AUTO: 0.02 K/UL (ref 0–0.2)
BASOPHILS NFR BLD: 1 % (ref 0–1.9)
BILIRUB SERPL-MCNC: 0.3 MG/DL (ref 0.1–1)
BUN SERPL-MCNC: 19 MG/DL (ref 8–23)
CALCIUM SERPL-MCNC: 8.2 MG/DL (ref 8.7–10.5)
CHLORIDE SERPL-SCNC: 104 MMOL/L (ref 95–110)
CO2 SERPL-SCNC: 30 MMOL/L (ref 23–29)
CREAT SERPL-MCNC: 0.7 MG/DL (ref 0.5–1.4)
DIFFERENTIAL METHOD: ABNORMAL
EOSINOPHIL # BLD AUTO: 0 K/UL (ref 0–0.5)
EOSINOPHIL NFR BLD: 2 % (ref 0–8)
ERYTHROCYTE [DISTWIDTH] IN BLOOD BY AUTOMATED COUNT: 14.8 % (ref 11.5–14.5)
EST. GFR  (AFRICAN AMERICAN): >60 ML/MIN/1.73 M^2
EST. GFR  (NON AFRICAN AMERICAN): >60 ML/MIN/1.73 M^2
GLUCOSE SERPL-MCNC: 115 MG/DL (ref 70–110)
HCT VFR BLD AUTO: 34.5 % (ref 37–48.5)
HGB BLD-MCNC: 10.8 G/DL (ref 12–16)
IMM GRANULOCYTES # BLD AUTO: 0.01 K/UL (ref 0–0.04)
IMM GRANULOCYTES NFR BLD AUTO: 0.5 % (ref 0–0.5)
LYMPHOCYTES # BLD AUTO: 1 K/UL (ref 1–4.8)
LYMPHOCYTES NFR BLD: 47.5 % (ref 18–48)
MCH RBC QN AUTO: 25.1 PG (ref 27–31)
MCHC RBC AUTO-ENTMCNC: 31.3 G/DL (ref 32–36)
MCV RBC AUTO: 80 FL (ref 82–98)
MONOCYTES # BLD AUTO: 0.3 K/UL (ref 0.3–1)
MONOCYTES NFR BLD: 14.9 % (ref 4–15)
NEUTROPHILS # BLD AUTO: 0.7 K/UL (ref 1.8–7.7)
NEUTROPHILS NFR BLD: 34.1 % (ref 38–73)
NRBC BLD-RTO: 0 /100 WBC
PLATELET # BLD AUTO: 166 K/UL (ref 150–350)
PMV BLD AUTO: 9.5 FL (ref 9.2–12.9)
POTASSIUM SERPL-SCNC: 3.4 MMOL/L (ref 3.5–5.1)
PROT SERPL-MCNC: 5.7 G/DL (ref 6–8.4)
RBC # BLD AUTO: 4.31 M/UL (ref 4–5.4)
SODIUM SERPL-SCNC: 146 MMOL/L (ref 136–145)
WBC # BLD AUTO: 2.02 K/UL (ref 3.9–12.7)

## 2021-02-17 PROCEDURE — 3288F PR FALLS RISK ASSESSMENT DOCUMENTED: ICD-10-PCS | Mod: CPTII,S$GLB,, | Performed by: INTERNAL MEDICINE

## 2021-02-17 PROCEDURE — 1159F PR MEDICATION LIST DOCUMENTED IN MEDICAL RECORD: ICD-10-PCS | Mod: S$GLB,,, | Performed by: INTERNAL MEDICINE

## 2021-02-17 PROCEDURE — 3078F DIAST BP <80 MM HG: CPT | Mod: CPTII,S$GLB,, | Performed by: INTERNAL MEDICINE

## 2021-02-17 PROCEDURE — 80053 COMPREHEN METABOLIC PANEL: CPT

## 2021-02-17 PROCEDURE — 3288F FALL RISK ASSESSMENT DOCD: CPT | Mod: CPTII,S$GLB,, | Performed by: INTERNAL MEDICINE

## 2021-02-17 PROCEDURE — 36415 COLL VENOUS BLD VENIPUNCTURE: CPT

## 2021-02-17 PROCEDURE — 99999 PR PBB SHADOW E&M-EST. PATIENT-LVL IV: CPT | Mod: PBBFAC,,, | Performed by: INTERNAL MEDICINE

## 2021-02-17 PROCEDURE — 1126F AMNT PAIN NOTED NONE PRSNT: CPT | Mod: S$GLB,,, | Performed by: INTERNAL MEDICINE

## 2021-02-17 PROCEDURE — 3078F PR MOST RECENT DIASTOLIC BLOOD PRESSURE < 80 MM HG: ICD-10-PCS | Mod: CPTII,S$GLB,, | Performed by: INTERNAL MEDICINE

## 2021-02-17 PROCEDURE — 1101F PR PT FALLS ASSESS DOC 0-1 FALLS W/OUT INJ PAST YR: ICD-10-PCS | Mod: CPTII,S$GLB,, | Performed by: INTERNAL MEDICINE

## 2021-02-17 PROCEDURE — 99214 OFFICE O/P EST MOD 30 MIN: CPT | Mod: S$GLB,,, | Performed by: INTERNAL MEDICINE

## 2021-02-17 PROCEDURE — 1101F PT FALLS ASSESS-DOCD LE1/YR: CPT | Mod: CPTII,S$GLB,, | Performed by: INTERNAL MEDICINE

## 2021-02-17 PROCEDURE — 1159F MED LIST DOCD IN RCRD: CPT | Mod: S$GLB,,, | Performed by: INTERNAL MEDICINE

## 2021-02-17 PROCEDURE — 85025 COMPLETE CBC W/AUTO DIFF WBC: CPT

## 2021-02-17 PROCEDURE — 3077F PR MOST RECENT SYSTOLIC BLOOD PRESSURE >= 140 MM HG: ICD-10-PCS | Mod: CPTII,S$GLB,, | Performed by: INTERNAL MEDICINE

## 2021-02-17 PROCEDURE — 3077F SYST BP >= 140 MM HG: CPT | Mod: CPTII,S$GLB,, | Performed by: INTERNAL MEDICINE

## 2021-02-17 PROCEDURE — 1126F PR PAIN SEVERITY QUANTIFIED, NO PAIN PRESENT: ICD-10-PCS | Mod: S$GLB,,, | Performed by: INTERNAL MEDICINE

## 2021-02-17 PROCEDURE — 99214 PR OFFICE/OUTPT VISIT, EST, LEVL IV, 30-39 MIN: ICD-10-PCS | Mod: S$GLB,,, | Performed by: INTERNAL MEDICINE

## 2021-02-17 PROCEDURE — 99999 PR PBB SHADOW E&M-EST. PATIENT-LVL IV: ICD-10-PCS | Mod: PBBFAC,,, | Performed by: INTERNAL MEDICINE

## 2021-02-17 RX ORDER — HYDROCODONE BITARTRATE AND ACETAMINOPHEN 7.5; 325 MG/1; MG/1
1 TABLET ORAL 3 TIMES DAILY PRN
COMMUNITY
Start: 2021-02-02

## 2021-02-23 ENCOUNTER — EXTERNAL HOME HEALTH (OUTPATIENT)
Dept: HOME HEALTH SERVICES | Facility: HOSPITAL | Age: 81
End: 2021-02-23
Payer: MEDICARE

## 2021-02-24 ENCOUNTER — HOSPITAL ENCOUNTER (OUTPATIENT)
Facility: HOSPITAL | Age: 81
Discharge: HOME | End: 2021-02-26
Attending: EMERGENCY MEDICINE | Admitting: EMERGENCY MEDICINE
Payer: MEDICARE

## 2021-02-24 ENCOUNTER — INFUSION (OUTPATIENT)
Dept: INFUSION THERAPY | Facility: HOSPITAL | Age: 81
End: 2021-02-24
Attending: INTERNAL MEDICINE
Payer: MEDICARE

## 2021-02-24 VITALS
RESPIRATION RATE: 20 BRPM | DIASTOLIC BLOOD PRESSURE: 95 MMHG | HEIGHT: 66 IN | BODY MASS INDEX: 28.09 KG/M2 | WEIGHT: 174.81 LBS | SYSTOLIC BLOOD PRESSURE: 218 MMHG | OXYGEN SATURATION: 98 % | HEART RATE: 99 BPM | TEMPERATURE: 98 F

## 2021-02-24 DIAGNOSIS — C18.0 MALIGNANT NEOPLASM OF CECUM: ICD-10-CM

## 2021-02-24 DIAGNOSIS — D70.1 CHEMOTHERAPY-INDUCED NEUTROPENIA: ICD-10-CM

## 2021-02-24 DIAGNOSIS — T45.1X5A CHEMOTHERAPY-INDUCED NEUTROPENIA: ICD-10-CM

## 2021-02-24 DIAGNOSIS — I63.9 STROKE: ICD-10-CM

## 2021-02-24 DIAGNOSIS — C18.0 CARCINOMA OF CECUM: Primary | ICD-10-CM

## 2021-02-24 DIAGNOSIS — C18.0 CARCINOMA OF CECUM: ICD-10-CM

## 2021-02-24 DIAGNOSIS — R53.83 FATIGUE, UNSPECIFIED TYPE: ICD-10-CM

## 2021-02-24 DIAGNOSIS — R27.0 ATAXIA: ICD-10-CM

## 2021-02-24 DIAGNOSIS — E83.42 HYPOMAGNESEMIA: ICD-10-CM

## 2021-02-24 DIAGNOSIS — E87.6 HYPOKALEMIA: Primary | ICD-10-CM

## 2021-02-24 DIAGNOSIS — I10 ESSENTIAL HYPERTENSION: ICD-10-CM

## 2021-02-24 DIAGNOSIS — R19.5 LOOSE STOOLS: ICD-10-CM

## 2021-02-24 DIAGNOSIS — Z09 CHEMOTHERAPY FOLLOW-UP EXAMINATION: ICD-10-CM

## 2021-02-24 PROBLEM — R07.89 CHEST PAIN, ATYPICAL: Status: RESOLVED | Noted: 2020-10-16 | Resolved: 2021-02-24

## 2021-02-24 PROBLEM — Z72.0 TOBACCO ABUSE: Chronic | Status: ACTIVE | Noted: 2017-10-25

## 2021-02-24 PROBLEM — R41.82 ALTERED MENTAL STATUS: Status: RESOLVED | Noted: 2019-01-13 | Resolved: 2021-02-24

## 2021-02-24 PROBLEM — F03.90 DEMENTIA WITHOUT BEHAVIORAL DISTURBANCE: Chronic | Status: ACTIVE | Noted: 2017-10-25

## 2021-02-24 PROBLEM — E78.5 HYPERLIPIDEMIA: Chronic | Status: ACTIVE | Noted: 2019-01-13

## 2021-02-24 LAB
ALBUMIN SERPL BCP-MCNC: 2.6 G/DL (ref 3.5–5.2)
ALBUMIN SERPL BCP-MCNC: 2.9 G/DL (ref 3.5–5.2)
ALP SERPL-CCNC: 124 U/L (ref 55–135)
ALP SERPL-CCNC: 124 U/L (ref 55–135)
ALT SERPL W/O P-5'-P-CCNC: 10 U/L (ref 10–44)
ALT SERPL W/O P-5'-P-CCNC: 9 U/L (ref 10–44)
AMPHET+METHAMPHET UR QL: NEGATIVE
ANION GAP SERPL CALC-SCNC: 10 MMOL/L (ref 8–16)
ANION GAP SERPL CALC-SCNC: 14 MMOL/L (ref 8–16)
AST SERPL-CCNC: 18 U/L (ref 10–40)
AST SERPL-CCNC: 18 U/L (ref 10–40)
BARBITURATES UR QL SCN>200 NG/ML: NEGATIVE
BASOPHILS # BLD AUTO: 0.02 K/UL (ref 0–0.2)
BASOPHILS # BLD AUTO: 0.05 K/UL (ref 0–0.2)
BASOPHILS NFR BLD: 0.7 % (ref 0–1.9)
BASOPHILS NFR BLD: 1.5 % (ref 0–1.9)
BENZODIAZ UR QL SCN>200 NG/ML: NEGATIVE
BILIRUB SERPL-MCNC: 0.3 MG/DL (ref 0.1–1)
BILIRUB SERPL-MCNC: 0.3 MG/DL (ref 0.1–1)
BILIRUB UR QL STRIP: NEGATIVE
BUN SERPL-MCNC: 11 MG/DL (ref 8–23)
BUN SERPL-MCNC: 9 MG/DL (ref 8–23)
BZE UR QL SCN: NEGATIVE
CALCIUM SERPL-MCNC: 7.6 MG/DL (ref 8.7–10.5)
CALCIUM SERPL-MCNC: 8.1 MG/DL (ref 8.7–10.5)
CANNABINOIDS UR QL SCN: NEGATIVE
CHLORIDE SERPL-SCNC: 103 MMOL/L (ref 95–110)
CHLORIDE SERPL-SCNC: 104 MMOL/L (ref 95–110)
CLARITY UR: CLEAR
CO2 SERPL-SCNC: 27 MMOL/L (ref 23–29)
CO2 SERPL-SCNC: 32 MMOL/L (ref 23–29)
COLOR UR: COLORLESS
CREAT SERPL-MCNC: 0.7 MG/DL (ref 0.5–1.4)
CREAT SERPL-MCNC: 0.7 MG/DL (ref 0.5–1.4)
CREAT UR-MCNC: 15 MG/DL (ref 15–325)
CTP QC/QA: YES
DIFFERENTIAL METHOD: ABNORMAL
DIFFERENTIAL METHOD: ABNORMAL
EOSINOPHIL # BLD AUTO: 0 K/UL (ref 0–0.5)
EOSINOPHIL # BLD AUTO: 0.1 K/UL (ref 0–0.5)
EOSINOPHIL NFR BLD: 0 % (ref 0–8)
EOSINOPHIL NFR BLD: 1.8 % (ref 0–8)
ERYTHROCYTE [DISTWIDTH] IN BLOOD BY AUTOMATED COUNT: 15.3 % (ref 11.5–14.5)
ERYTHROCYTE [DISTWIDTH] IN BLOOD BY AUTOMATED COUNT: 15.6 % (ref 11.5–14.5)
EST. GFR  (AFRICAN AMERICAN): >60 ML/MIN/1.73 M^2
EST. GFR  (AFRICAN AMERICAN): >60 ML/MIN/1.73 M^2
EST. GFR  (NON AFRICAN AMERICAN): >60 ML/MIN/1.73 M^2
EST. GFR  (NON AFRICAN AMERICAN): >60 ML/MIN/1.73 M^2
GLUCOSE SERPL-MCNC: 156 MG/DL (ref 70–110)
GLUCOSE SERPL-MCNC: 87 MG/DL (ref 70–110)
GLUCOSE UR QL STRIP: NEGATIVE
HCT VFR BLD AUTO: 32.9 % (ref 37–48.5)
HCT VFR BLD AUTO: 34.8 % (ref 37–48.5)
HGB BLD-MCNC: 10.4 G/DL (ref 12–16)
HGB BLD-MCNC: 11.1 G/DL (ref 12–16)
HGB UR QL STRIP: NEGATIVE
IMM GRANULOCYTES # BLD AUTO: 0.01 K/UL (ref 0–0.04)
IMM GRANULOCYTES # BLD AUTO: 0.04 K/UL (ref 0–0.04)
IMM GRANULOCYTES NFR BLD AUTO: 0.3 % (ref 0–0.5)
IMM GRANULOCYTES NFR BLD AUTO: 1.4 % (ref 0–0.5)
INR PPP: 1 (ref 0.8–1.2)
KETONES UR QL STRIP: NEGATIVE
LEUKOCYTE ESTERASE UR QL STRIP: NEGATIVE
LYMPHOCYTES # BLD AUTO: 0.4 K/UL (ref 1–4.8)
LYMPHOCYTES # BLD AUTO: 0.9 K/UL (ref 1–4.8)
LYMPHOCYTES NFR BLD: 14 % (ref 18–48)
LYMPHOCYTES NFR BLD: 26.7 % (ref 18–48)
MAGNESIUM SERPL-MCNC: 1.3 MG/DL (ref 1.6–2.6)
MCH RBC QN AUTO: 25.1 PG (ref 27–31)
MCH RBC QN AUTO: 25.2 PG (ref 27–31)
MCHC RBC AUTO-ENTMCNC: 31.6 G/DL (ref 32–36)
MCHC RBC AUTO-ENTMCNC: 31.9 G/DL (ref 32–36)
MCV RBC AUTO: 79 FL (ref 82–98)
MCV RBC AUTO: 80 FL (ref 82–98)
METHADONE UR QL SCN>300 NG/ML: NEGATIVE
MONOCYTES # BLD AUTO: 0.1 K/UL (ref 0.3–1)
MONOCYTES # BLD AUTO: 0.7 K/UL (ref 0.3–1)
MONOCYTES NFR BLD: 19.1 % (ref 4–15)
MONOCYTES NFR BLD: 3.1 % (ref 4–15)
NEUTROPHILS # BLD AUTO: 1.7 K/UL (ref 1.8–7.7)
NEUTROPHILS # BLD AUTO: 2.3 K/UL (ref 1.8–7.7)
NEUTROPHILS NFR BLD: 50.6 % (ref 38–73)
NEUTROPHILS NFR BLD: 80.8 % (ref 38–73)
NITRITE UR QL STRIP: NEGATIVE
NRBC BLD-RTO: 0 /100 WBC
NRBC BLD-RTO: 0 /100 WBC
OPIATES UR QL SCN: NORMAL
PCP UR QL SCN>25 NG/ML: NEGATIVE
PH UR STRIP: 8 [PH] (ref 5–8)
PLATELET # BLD AUTO: 233 K/UL (ref 150–350)
PLATELET # BLD AUTO: 253 K/UL (ref 150–350)
PMV BLD AUTO: 10.2 FL (ref 9.2–12.9)
PMV BLD AUTO: 9.6 FL (ref 9.2–12.9)
POCT GLUCOSE: 154 MG/DL (ref 70–110)
POCT GLUCOSE: 171 MG/DL (ref 70–110)
POTASSIUM SERPL-SCNC: 2.8 MMOL/L (ref 3.5–5.1)
POTASSIUM SERPL-SCNC: 2.9 MMOL/L (ref 3.5–5.1)
PROT SERPL-MCNC: 5.6 G/DL (ref 6–8.4)
PROT SERPL-MCNC: 6 G/DL (ref 6–8.4)
PROT UR QL STRIP: NEGATIVE
PROTHROMBIN TIME: 11 SEC (ref 9–12.5)
RBC # BLD AUTO: 4.14 M/UL (ref 4–5.4)
RBC # BLD AUTO: 4.41 M/UL (ref 4–5.4)
SARS-COV-2 RDRP RESP QL NAA+PROBE: NEGATIVE
SODIUM SERPL-SCNC: 144 MMOL/L (ref 136–145)
SODIUM SERPL-SCNC: 146 MMOL/L (ref 136–145)
SP GR UR STRIP: 1.01 (ref 1–1.03)
TOXICOLOGY INFORMATION: NORMAL
TROPONIN I SERPL DL<=0.01 NG/ML-MCNC: 0.01 NG/ML (ref 0–0.03)
TSH SERPL DL<=0.005 MIU/L-ACNC: 1.16 UIU/ML (ref 0.4–4)
URN SPEC COLLECT METH UR: ABNORMAL
UROBILINOGEN UR STRIP-ACNC: NEGATIVE EU/DL
WBC # BLD AUTO: 2.86 K/UL (ref 3.9–12.7)
WBC # BLD AUTO: 3.41 K/UL (ref 3.9–12.7)

## 2021-02-24 PROCEDURE — 85610 PROTHROMBIN TIME: CPT

## 2021-02-24 PROCEDURE — 93010 EKG 12-LEAD: ICD-10-PCS | Mod: ,,, | Performed by: INTERNAL MEDICINE

## 2021-02-24 PROCEDURE — U0002 COVID-19 LAB TEST NON-CDC: HCPCS | Performed by: EMERGENCY MEDICINE

## 2021-02-24 PROCEDURE — 80053 COMPREHEN METABOLIC PANEL: CPT | Mod: 91

## 2021-02-24 PROCEDURE — 81003 URINALYSIS AUTO W/O SCOPE: CPT | Mod: 59

## 2021-02-24 PROCEDURE — 85025 COMPLETE CBC W/AUTO DIFF WBC: CPT | Mod: 91

## 2021-02-24 PROCEDURE — 82962 GLUCOSE BLOOD TEST: CPT | Mod: 91

## 2021-02-24 PROCEDURE — 80307 DRUG TEST PRSMV CHEM ANLYZR: CPT

## 2021-02-24 PROCEDURE — G0426 PR INPT TELEHEALTH CONSULT 50M: ICD-10-PCS | Mod: GT,,, | Performed by: PSYCHIATRY & NEUROLOGY

## 2021-02-24 PROCEDURE — 99291 CRITICAL CARE FIRST HOUR: CPT | Mod: 25

## 2021-02-24 PROCEDURE — 93005 ELECTROCARDIOGRAM TRACING: CPT

## 2021-02-24 PROCEDURE — 25500020 PHARM REV CODE 255: Performed by: EMERGENCY MEDICINE

## 2021-02-24 PROCEDURE — 93010 ELECTROCARDIOGRAM REPORT: CPT | Mod: ,,, | Performed by: INTERNAL MEDICINE

## 2021-02-24 PROCEDURE — 25000003 PHARM REV CODE 250: Performed by: HOSPITALIST

## 2021-02-24 PROCEDURE — A9585 GADOBUTROL INJECTION: HCPCS | Performed by: EMERGENCY MEDICINE

## 2021-02-24 PROCEDURE — 96368 THER/DIAG CONCURRENT INF: CPT

## 2021-02-24 PROCEDURE — 80053 COMPREHEN METABOLIC PANEL: CPT

## 2021-02-24 PROCEDURE — 25000003 PHARM REV CODE 250: Performed by: INTERNAL MEDICINE

## 2021-02-24 PROCEDURE — 96415 CHEMO IV INFUSION ADDL HR: CPT

## 2021-02-24 PROCEDURE — 85025 COMPLETE CBC W/AUTO DIFF WBC: CPT

## 2021-02-24 PROCEDURE — 63600175 PHARM REV CODE 636 W HCPCS: Performed by: INTERNAL MEDICINE

## 2021-02-24 PROCEDURE — G0378 HOSPITAL OBSERVATION PER HR: HCPCS

## 2021-02-24 PROCEDURE — 96367 TX/PROPH/DG ADDL SEQ IV INF: CPT

## 2021-02-24 PROCEDURE — G0426 INPT/ED TELECONSULT50: HCPCS | Mod: GT,,, | Performed by: PSYCHIATRY & NEUROLOGY

## 2021-02-24 PROCEDURE — 96413 CHEMO IV INFUSION 1 HR: CPT

## 2021-02-24 PROCEDURE — 84443 ASSAY THYROID STIM HORMONE: CPT

## 2021-02-24 PROCEDURE — 83735 ASSAY OF MAGNESIUM: CPT

## 2021-02-24 PROCEDURE — 84484 ASSAY OF TROPONIN QUANT: CPT

## 2021-02-24 RX ORDER — EPINEPHRINE 0.3 MG/.3ML
0.3 INJECTION SUBCUTANEOUS ONCE AS NEEDED
Status: CANCELLED | OUTPATIENT
Start: 2021-02-24

## 2021-02-24 RX ORDER — FLUOROURACIL 50 MG/ML
400 INJECTION, SOLUTION INTRAVENOUS
Status: CANCELLED | OUTPATIENT
Start: 2021-02-24

## 2021-02-24 RX ORDER — HEPARIN 100 UNIT/ML
500 SYRINGE INTRAVENOUS
Status: CANCELLED | OUTPATIENT
Start: 2021-02-25

## 2021-02-24 RX ORDER — TALC
6 POWDER (GRAM) TOPICAL NIGHTLY PRN
Status: DISCONTINUED | OUTPATIENT
Start: 2021-02-24 | End: 2021-02-26 | Stop reason: HOSPADM

## 2021-02-24 RX ORDER — HEPARIN 100 UNIT/ML
500 SYRINGE INTRAVENOUS
Status: CANCELLED | OUTPATIENT
Start: 2021-02-24

## 2021-02-24 RX ORDER — ROPINIROLE 0.25 MG/1
0.25 TABLET, FILM COATED ORAL 2 TIMES DAILY
Status: DISCONTINUED | OUTPATIENT
Start: 2021-02-24 | End: 2021-02-26 | Stop reason: HOSPADM

## 2021-02-24 RX ORDER — DIPHENHYDRAMINE HYDROCHLORIDE 50 MG/ML
50 INJECTION INTRAMUSCULAR; INTRAVENOUS ONCE AS NEEDED
Status: CANCELLED | OUTPATIENT
Start: 2021-02-24

## 2021-02-24 RX ORDER — HYDROCODONE BITARTRATE AND ACETAMINOPHEN 10; 325 MG/1; MG/1
1 TABLET ORAL EVERY 4 HOURS PRN
Status: DISCONTINUED | OUTPATIENT
Start: 2021-02-24 | End: 2021-02-26 | Stop reason: HOSPADM

## 2021-02-24 RX ORDER — LISINOPRIL 20 MG/1
40 TABLET ORAL DAILY
Status: DISCONTINUED | OUTPATIENT
Start: 2021-02-25 | End: 2021-02-26 | Stop reason: HOSPADM

## 2021-02-24 RX ORDER — ATORVASTATIN CALCIUM 10 MG/1
20 TABLET, FILM COATED ORAL DAILY
Status: DISCONTINUED | OUTPATIENT
Start: 2021-02-25 | End: 2021-02-26 | Stop reason: HOSPADM

## 2021-02-24 RX ORDER — SODIUM CHLORIDE 0.9 % (FLUSH) 0.9 %
10 SYRINGE (ML) INJECTION
Status: CANCELLED | OUTPATIENT
Start: 2021-02-25

## 2021-02-24 RX ORDER — POTASSIUM CHLORIDE 7.45 MG/ML
10 INJECTION INTRAVENOUS
Status: COMPLETED | OUTPATIENT
Start: 2021-02-24 | End: 2021-02-24

## 2021-02-24 RX ORDER — DULOXETIN HYDROCHLORIDE 30 MG/1
30 CAPSULE, DELAYED RELEASE ORAL NIGHTLY
Status: DISCONTINUED | OUTPATIENT
Start: 2021-02-24 | End: 2021-02-26 | Stop reason: HOSPADM

## 2021-02-24 RX ORDER — GADOBUTROL 604.72 MG/ML
10 INJECTION INTRAVENOUS
Status: COMPLETED | OUTPATIENT
Start: 2021-02-24 | End: 2021-02-24

## 2021-02-24 RX ORDER — FLUOROURACIL 50 MG/ML
400 INJECTION, SOLUTION INTRAVENOUS
Status: DISCONTINUED | OUTPATIENT
Start: 2021-02-24 | End: 2021-02-24 | Stop reason: HOSPADM

## 2021-02-24 RX ORDER — ACETAMINOPHEN 325 MG/1
650 TABLET ORAL EVERY 6 HOURS PRN
Status: DISCONTINUED | OUTPATIENT
Start: 2021-02-24 | End: 2021-02-26 | Stop reason: HOSPADM

## 2021-02-24 RX ORDER — SODIUM CHLORIDE 0.9 % (FLUSH) 0.9 %
10 SYRINGE (ML) INJECTION
Status: CANCELLED | OUTPATIENT
Start: 2021-02-24

## 2021-02-24 RX ORDER — MEMANTINE HYDROCHLORIDE 5 MG/1
5 TABLET ORAL DAILY
Status: DISCONTINUED | OUTPATIENT
Start: 2021-02-25 | End: 2021-02-26 | Stop reason: HOSPADM

## 2021-02-24 RX ORDER — ESCITALOPRAM OXALATE 10 MG/1
10 TABLET ORAL DAILY
Status: DISCONTINUED | OUTPATIENT
Start: 2021-02-25 | End: 2021-02-26 | Stop reason: HOSPADM

## 2021-02-24 RX ORDER — GABAPENTIN 300 MG/1
300 CAPSULE ORAL 3 TIMES DAILY
Status: DISCONTINUED | OUTPATIENT
Start: 2021-02-24 | End: 2021-02-26 | Stop reason: HOSPADM

## 2021-02-24 RX ORDER — DONEPEZIL HYDROCHLORIDE 10 MG/1
10 TABLET, FILM COATED ORAL NIGHTLY
Status: DISCONTINUED | OUTPATIENT
Start: 2021-02-24 | End: 2021-02-26 | Stop reason: HOSPADM

## 2021-02-24 RX ADMIN — POTASSIUM CHLORIDE 10 MEQ: 7.46 INJECTION, SOLUTION INTRAVENOUS at 01:02

## 2021-02-24 RX ADMIN — Medication 6 MG: at 08:02

## 2021-02-24 RX ADMIN — DEXTROSE: 5 SOLUTION INTRAVENOUS at 10:02

## 2021-02-24 RX ADMIN — GABAPENTIN 300 MG: 300 CAPSULE ORAL at 08:02

## 2021-02-24 RX ADMIN — DULOXETINE HYDROCHLORIDE 30 MG: 30 CAPSULE, DELAYED RELEASE ORAL at 08:02

## 2021-02-24 RX ADMIN — DEXAMETHASONE SODIUM PHOSPHATE: 10 INJECTION, SOLUTION INTRAMUSCULAR; INTRAVENOUS at 11:02

## 2021-02-24 RX ADMIN — DONEPEZIL HYDROCHLORIDE 10 MG: 10 TABLET, FILM COATED ORAL at 08:02

## 2021-02-24 RX ADMIN — LEUCOVORIN CALCIUM 770 MG: 350 INJECTION, POWDER, LYOPHILIZED, FOR SOLUTION INTRAMUSCULAR; INTRAVENOUS at 11:02

## 2021-02-24 RX ADMIN — GADOBUTROL 10 ML: 604.72 INJECTION INTRAVENOUS at 06:02

## 2021-02-24 RX ADMIN — OXALIPLATIN 163 MG: 100 INJECTION, SOLUTION INTRAVENOUS at 11:02

## 2021-02-24 RX ADMIN — ROPINIROLE HYDROCHLORIDE 0.25 MG: 0.25 TABLET, FILM COATED ORAL at 08:02

## 2021-02-24 RX ADMIN — HYDROCODONE BITARTRATE AND ACETAMINOPHEN 1 TABLET: 10; 325 TABLET ORAL at 08:02

## 2021-02-25 PROBLEM — R19.5 LOOSE STOOLS: Status: ACTIVE | Noted: 2021-02-25

## 2021-02-25 PROBLEM — R29.898 WEAKNESS OF BOTH LOWER EXTREMITIES: Status: ACTIVE | Noted: 2021-02-25

## 2021-02-25 PROBLEM — E83.42 HYPOMAGNESEMIA: Status: ACTIVE | Noted: 2021-02-25

## 2021-02-25 LAB
ANION GAP SERPL CALC-SCNC: 11 MMOL/L (ref 8–16)
ANION GAP SERPL CALC-SCNC: 9 MMOL/L (ref 8–16)
BUN SERPL-MCNC: 7 MG/DL (ref 8–23)
BUN SERPL-MCNC: 8 MG/DL (ref 8–23)
CALCIUM SERPL-MCNC: 7.7 MG/DL (ref 8.7–10.5)
CALCIUM SERPL-MCNC: 8.1 MG/DL (ref 8.7–10.5)
CHLORIDE SERPL-SCNC: 100 MMOL/L (ref 95–110)
CHLORIDE SERPL-SCNC: 100 MMOL/L (ref 95–110)
CO2 SERPL-SCNC: 32 MMOL/L (ref 23–29)
CO2 SERPL-SCNC: 35 MMOL/L (ref 23–29)
CREAT SERPL-MCNC: 0.6 MG/DL (ref 0.5–1.4)
CREAT SERPL-MCNC: 0.6 MG/DL (ref 0.5–1.4)
EST. GFR  (AFRICAN AMERICAN): >60 ML/MIN/1.73 M^2
EST. GFR  (AFRICAN AMERICAN): >60 ML/MIN/1.73 M^2
EST. GFR  (NON AFRICAN AMERICAN): >60 ML/MIN/1.73 M^2
EST. GFR  (NON AFRICAN AMERICAN): >60 ML/MIN/1.73 M^2
GLUCOSE SERPL-MCNC: 123 MG/DL (ref 70–110)
GLUCOSE SERPL-MCNC: 131 MG/DL (ref 70–110)
POTASSIUM SERPL-SCNC: 2.5 MMOL/L (ref 3.5–5.1)
POTASSIUM SERPL-SCNC: 2.6 MMOL/L (ref 3.5–5.1)
POTASSIUM SERPL-SCNC: 2.8 MMOL/L (ref 3.5–5.1)
POTASSIUM SERPL-SCNC: 2.9 MMOL/L (ref 3.5–5.1)
PREALB SERPL-MCNC: 9 MG/DL (ref 20–43)
SODIUM SERPL-SCNC: 143 MMOL/L (ref 136–145)
SODIUM SERPL-SCNC: 144 MMOL/L (ref 136–145)

## 2021-02-25 PROCEDURE — 96372 THER/PROPH/DIAG INJ SC/IM: CPT | Mod: 59

## 2021-02-25 PROCEDURE — 63600175 PHARM REV CODE 636 W HCPCS: Performed by: NURSE PRACTITIONER

## 2021-02-25 PROCEDURE — 97161 PT EVAL LOW COMPLEX 20 MIN: CPT

## 2021-02-25 PROCEDURE — 36415 COLL VENOUS BLD VENIPUNCTURE: CPT

## 2021-02-25 PROCEDURE — 25000003 PHARM REV CODE 250: Performed by: NURSE PRACTITIONER

## 2021-02-25 PROCEDURE — 97116 GAIT TRAINING THERAPY: CPT

## 2021-02-25 PROCEDURE — 25000003 PHARM REV CODE 250: Performed by: HOSPITALIST

## 2021-02-25 PROCEDURE — G0378 HOSPITAL OBSERVATION PER HR: HCPCS

## 2021-02-25 PROCEDURE — 99203 OFFICE O/P NEW LOW 30 MIN: CPT | Mod: ,,, | Performed by: PSYCHIATRY & NEUROLOGY

## 2021-02-25 PROCEDURE — 96374 THER/PROPH/DIAG INJ IV PUSH: CPT

## 2021-02-25 PROCEDURE — 96375 TX/PRO/DX INJ NEW DRUG ADDON: CPT

## 2021-02-25 PROCEDURE — 80048 BASIC METABOLIC PNL TOTAL CA: CPT

## 2021-02-25 PROCEDURE — 96376 TX/PRO/DX INJ SAME DRUG ADON: CPT

## 2021-02-25 PROCEDURE — 84132 ASSAY OF SERUM POTASSIUM: CPT | Mod: 91

## 2021-02-25 PROCEDURE — 80048 BASIC METABOLIC PNL TOTAL CA: CPT | Mod: 91

## 2021-02-25 PROCEDURE — 96361 HYDRATE IV INFUSION ADD-ON: CPT

## 2021-02-25 PROCEDURE — 84134 ASSAY OF PREALBUMIN: CPT

## 2021-02-25 PROCEDURE — 99203 PR OFFICE/OUTPT VISIT, NEW, LEVL III, 30-44 MIN: ICD-10-PCS | Mod: ,,, | Performed by: PSYCHIATRY & NEUROLOGY

## 2021-02-25 RX ORDER — ENOXAPARIN SODIUM 100 MG/ML
40 INJECTION SUBCUTANEOUS EVERY 24 HOURS
Status: DISCONTINUED | OUTPATIENT
Start: 2021-02-25 | End: 2021-02-26 | Stop reason: HOSPADM

## 2021-02-25 RX ORDER — POTASSIUM CHLORIDE 750 MG/1
10 CAPSULE, EXTENDED RELEASE ORAL 2 TIMES DAILY
Qty: 8 CAPSULE | Refills: 0 | Status: SHIPPED | OUTPATIENT
Start: 2021-02-25 | End: 2021-02-25 | Stop reason: SDUPTHER

## 2021-02-25 RX ORDER — SODIUM CHLORIDE AND POTASSIUM CHLORIDE 150; 900 MG/100ML; MG/100ML
INJECTION, SOLUTION INTRAVENOUS CONTINUOUS
Status: DISCONTINUED | OUTPATIENT
Start: 2021-02-25 | End: 2021-02-26 | Stop reason: HOSPADM

## 2021-02-25 RX ORDER — POTASSIUM CHLORIDE 750 MG/1
10 CAPSULE, EXTENDED RELEASE ORAL 2 TIMES DAILY
Qty: 4 CAPSULE | Refills: 0 | Status: SHIPPED | OUTPATIENT
Start: 2021-02-25 | End: 2021-02-27

## 2021-02-25 RX ORDER — MAGNESIUM SULFATE 1 G/100ML
1 INJECTION INTRAVENOUS ONCE
Status: COMPLETED | OUTPATIENT
Start: 2021-02-25 | End: 2021-02-25

## 2021-02-25 RX ORDER — POTASSIUM CHLORIDE 20 MEQ/1
60 TABLET, EXTENDED RELEASE ORAL ONCE
Status: COMPLETED | OUTPATIENT
Start: 2021-02-25 | End: 2021-02-25

## 2021-02-25 RX ORDER — POTASSIUM CHLORIDE 20 MEQ/1
40 TABLET, EXTENDED RELEASE ORAL ONCE
Status: COMPLETED | OUTPATIENT
Start: 2021-02-25 | End: 2021-02-25

## 2021-02-25 RX ORDER — POTASSIUM CHLORIDE 7.45 MG/ML
10 INJECTION INTRAVENOUS
Status: COMPLETED | OUTPATIENT
Start: 2021-02-25 | End: 2021-02-25

## 2021-02-25 RX ADMIN — ESCITALOPRAM OXALATE 10 MG: 10 TABLET ORAL at 08:02

## 2021-02-25 RX ADMIN — GABAPENTIN 300 MG: 300 CAPSULE ORAL at 08:02

## 2021-02-25 RX ADMIN — HYDROCODONE BITARTRATE AND ACETAMINOPHEN 1 TABLET: 10; 325 TABLET ORAL at 01:02

## 2021-02-25 RX ADMIN — DULOXETINE HYDROCHLORIDE 30 MG: 30 CAPSULE, DELAYED RELEASE ORAL at 08:02

## 2021-02-25 RX ADMIN — POTASSIUM CHLORIDE 10 MEQ: 7.46 INJECTION, SOLUTION INTRAVENOUS at 11:02

## 2021-02-25 RX ADMIN — ATORVASTATIN CALCIUM 20 MG: 10 TABLET, FILM COATED ORAL at 08:02

## 2021-02-25 RX ADMIN — HYDROCODONE BITARTRATE AND ACETAMINOPHEN 1 TABLET: 10; 325 TABLET ORAL at 12:02

## 2021-02-25 RX ADMIN — ROPINIROLE HYDROCHLORIDE 0.25 MG: 0.25 TABLET, FILM COATED ORAL at 08:02

## 2021-02-25 RX ADMIN — Medication 6 MG: at 08:02

## 2021-02-25 RX ADMIN — ENOXAPARIN SODIUM 40 MG: 40 INJECTION SUBCUTANEOUS at 08:02

## 2021-02-25 RX ADMIN — POTASSIUM CHLORIDE 60 MEQ: 1500 TABLET, EXTENDED RELEASE ORAL at 04:02

## 2021-02-25 RX ADMIN — GABAPENTIN 300 MG: 300 CAPSULE ORAL at 03:02

## 2021-02-25 RX ADMIN — DONEPEZIL HYDROCHLORIDE 10 MG: 10 TABLET, FILM COATED ORAL at 08:02

## 2021-02-25 RX ADMIN — LISINOPRIL 40 MG: 20 TABLET ORAL at 08:02

## 2021-02-25 RX ADMIN — POTASSIUM CHLORIDE 10 MEQ: 7.46 INJECTION, SOLUTION INTRAVENOUS at 08:02

## 2021-02-25 RX ADMIN — MEMANTINE HYDROCHLORIDE 5 MG: 5 TABLET ORAL at 08:02

## 2021-02-25 RX ADMIN — POTASSIUM CHLORIDE 10 MEQ: 7.46 INJECTION, SOLUTION INTRAVENOUS at 10:02

## 2021-02-25 RX ADMIN — POTASSIUM CHLORIDE 40 MEQ: 1500 TABLET, FILM COATED, EXTENDED RELEASE ORAL at 05:02

## 2021-02-25 RX ADMIN — HYDROCODONE BITARTRATE AND ACETAMINOPHEN 1 TABLET: 10; 325 TABLET ORAL at 08:02

## 2021-02-25 RX ADMIN — SODIUM CHLORIDE AND POTASSIUM CHLORIDE 125 ML/HR: 9; 1.49 INJECTION, SOLUTION INTRAVENOUS at 01:02

## 2021-02-25 RX ADMIN — MAGNESIUM SULFATE 1 G: 1 INJECTION INTRAVENOUS at 08:02

## 2021-02-26 VITALS
HEIGHT: 67 IN | HEART RATE: 68 BPM | OXYGEN SATURATION: 95 % | TEMPERATURE: 98 F | RESPIRATION RATE: 16 BRPM | DIASTOLIC BLOOD PRESSURE: 78 MMHG | SYSTOLIC BLOOD PRESSURE: 185 MMHG | WEIGHT: 153.25 LBS | BODY MASS INDEX: 24.05 KG/M2

## 2021-02-26 LAB
ANION GAP SERPL CALC-SCNC: 8 MMOL/L (ref 8–16)
ANION GAP SERPL CALC-SCNC: 9 MMOL/L (ref 8–16)
BUN SERPL-MCNC: 12 MG/DL (ref 8–23)
BUN SERPL-MCNC: 12 MG/DL (ref 8–23)
CALCIUM SERPL-MCNC: 7.4 MG/DL (ref 8.7–10.5)
CALCIUM SERPL-MCNC: 7.7 MG/DL (ref 8.7–10.5)
CHLORIDE SERPL-SCNC: 107 MMOL/L (ref 95–110)
CHLORIDE SERPL-SCNC: 110 MMOL/L (ref 95–110)
CO2 SERPL-SCNC: 31 MMOL/L (ref 23–29)
CO2 SERPL-SCNC: 32 MMOL/L (ref 23–29)
CREAT SERPL-MCNC: 0.6 MG/DL (ref 0.5–1.4)
CREAT SERPL-MCNC: 0.6 MG/DL (ref 0.5–1.4)
EST. GFR  (AFRICAN AMERICAN): >60 ML/MIN/1.73 M^2
EST. GFR  (AFRICAN AMERICAN): >60 ML/MIN/1.73 M^2
EST. GFR  (NON AFRICAN AMERICAN): >60 ML/MIN/1.73 M^2
EST. GFR  (NON AFRICAN AMERICAN): >60 ML/MIN/1.73 M^2
GLUCOSE SERPL-MCNC: 114 MG/DL (ref 70–110)
GLUCOSE SERPL-MCNC: 80 MG/DL (ref 70–110)
MAGNESIUM SERPL-MCNC: 1.6 MG/DL (ref 1.6–2.6)
POTASSIUM SERPL-SCNC: 3.8 MMOL/L (ref 3.5–5.1)
POTASSIUM SERPL-SCNC: 4.2 MMOL/L (ref 3.5–5.1)
SODIUM SERPL-SCNC: 148 MMOL/L (ref 136–145)
SODIUM SERPL-SCNC: 149 MMOL/L (ref 136–145)

## 2021-02-26 PROCEDURE — 36415 COLL VENOUS BLD VENIPUNCTURE: CPT

## 2021-02-26 PROCEDURE — G0378 HOSPITAL OBSERVATION PER HR: HCPCS

## 2021-02-26 PROCEDURE — 96361 HYDRATE IV INFUSION ADD-ON: CPT

## 2021-02-26 PROCEDURE — 80048 BASIC METABOLIC PNL TOTAL CA: CPT | Mod: 91

## 2021-02-26 PROCEDURE — 25000003 PHARM REV CODE 250: Performed by: HOSPITALIST

## 2021-02-26 PROCEDURE — 83735 ASSAY OF MAGNESIUM: CPT

## 2021-02-26 PROCEDURE — 63600175 PHARM REV CODE 636 W HCPCS: Performed by: NURSE PRACTITIONER

## 2021-02-26 PROCEDURE — 80048 BASIC METABOLIC PNL TOTAL CA: CPT

## 2021-02-26 PROCEDURE — 97116 GAIT TRAINING THERAPY: CPT

## 2021-02-26 RX ORDER — HEPARIN 100 UNIT/ML
5 SYRINGE INTRAVENOUS ONCE
Status: COMPLETED | OUTPATIENT
Start: 2021-02-26 | End: 2021-02-26

## 2021-02-26 RX ADMIN — GABAPENTIN 300 MG: 300 CAPSULE ORAL at 09:02

## 2021-02-26 RX ADMIN — ESCITALOPRAM OXALATE 10 MG: 10 TABLET ORAL at 09:02

## 2021-02-26 RX ADMIN — ROPINIROLE HYDROCHLORIDE 0.25 MG: 0.25 TABLET, FILM COATED ORAL at 09:02

## 2021-02-26 RX ADMIN — HYDROCODONE BITARTRATE AND ACETAMINOPHEN 1 TABLET: 10; 325 TABLET ORAL at 12:02

## 2021-02-26 RX ADMIN — HEPARIN 500 UNITS: 100 SYRINGE at 10:02

## 2021-02-26 RX ADMIN — LISINOPRIL 40 MG: 20 TABLET ORAL at 09:02

## 2021-02-26 RX ADMIN — SODIUM CHLORIDE AND POTASSIUM CHLORIDE: 9; 1.49 INJECTION, SOLUTION INTRAVENOUS at 12:02

## 2021-02-26 RX ADMIN — HYDROCODONE BITARTRATE AND ACETAMINOPHEN 1 TABLET: 10; 325 TABLET ORAL at 08:02

## 2021-02-26 RX ADMIN — MEMANTINE HYDROCHLORIDE 5 MG: 5 TABLET ORAL at 09:02

## 2021-02-26 RX ADMIN — ATORVASTATIN CALCIUM 20 MG: 10 TABLET, FILM COATED ORAL at 08:02

## 2021-03-08 ENCOUNTER — LAB VISIT (OUTPATIENT)
Dept: LAB | Facility: HOSPITAL | Age: 81
End: 2021-03-08
Attending: INTERNAL MEDICINE
Payer: MEDICARE

## 2021-03-08 DIAGNOSIS — C18.0 CARCINOMA OF CECUM: ICD-10-CM

## 2021-03-08 LAB
ALBUMIN SERPL BCP-MCNC: 2.8 G/DL (ref 3.5–5.2)
ALP SERPL-CCNC: 101 U/L (ref 55–135)
ALT SERPL W/O P-5'-P-CCNC: 8 U/L (ref 10–44)
ANION GAP SERPL CALC-SCNC: 7 MMOL/L (ref 8–16)
AST SERPL-CCNC: 19 U/L (ref 10–40)
BASOPHILS # BLD AUTO: 0.02 K/UL (ref 0–0.2)
BASOPHILS NFR BLD: 0.4 % (ref 0–1.9)
BILIRUB SERPL-MCNC: 0.3 MG/DL (ref 0.1–1)
BUN SERPL-MCNC: 15 MG/DL (ref 8–23)
CALCIUM SERPL-MCNC: 8.3 MG/DL (ref 8.7–10.5)
CHLORIDE SERPL-SCNC: 106 MMOL/L (ref 95–110)
CO2 SERPL-SCNC: 30 MMOL/L (ref 23–29)
CREAT SERPL-MCNC: 0.7 MG/DL (ref 0.5–1.4)
DIFFERENTIAL METHOD: ABNORMAL
EOSINOPHIL # BLD AUTO: 0 K/UL (ref 0–0.5)
EOSINOPHIL NFR BLD: 0.9 % (ref 0–8)
ERYTHROCYTE [DISTWIDTH] IN BLOOD BY AUTOMATED COUNT: 16.8 % (ref 11.5–14.5)
EST. GFR  (AFRICAN AMERICAN): >60 ML/MIN/1.73 M^2
EST. GFR  (NON AFRICAN AMERICAN): >60 ML/MIN/1.73 M^2
GLUCOSE SERPL-MCNC: 112 MG/DL (ref 70–110)
HCT VFR BLD AUTO: 35.3 % (ref 37–48.5)
HGB BLD-MCNC: 10.9 G/DL (ref 12–16)
IMM GRANULOCYTES # BLD AUTO: 0.01 K/UL (ref 0–0.04)
IMM GRANULOCYTES NFR BLD AUTO: 0.2 % (ref 0–0.5)
LYMPHOCYTES # BLD AUTO: 1 K/UL (ref 1–4.8)
LYMPHOCYTES NFR BLD: 21.7 % (ref 18–48)
MCH RBC QN AUTO: 24.8 PG (ref 27–31)
MCHC RBC AUTO-ENTMCNC: 30.9 G/DL (ref 32–36)
MCV RBC AUTO: 80 FL (ref 82–98)
MONOCYTES # BLD AUTO: 0.6 K/UL (ref 0.3–1)
MONOCYTES NFR BLD: 13.3 % (ref 4–15)
NEUTROPHILS # BLD AUTO: 2.9 K/UL (ref 1.8–7.7)
NEUTROPHILS NFR BLD: 63.5 % (ref 38–73)
NRBC BLD-RTO: 0 /100 WBC
PLATELET # BLD AUTO: 213 K/UL (ref 150–350)
PMV BLD AUTO: 10.1 FL (ref 9.2–12.9)
POTASSIUM SERPL-SCNC: 3.6 MMOL/L (ref 3.5–5.1)
PROT SERPL-MCNC: 5.8 G/DL (ref 6–8.4)
RBC # BLD AUTO: 4.4 M/UL (ref 4–5.4)
SODIUM SERPL-SCNC: 143 MMOL/L (ref 136–145)
WBC # BLD AUTO: 4.57 K/UL (ref 3.9–12.7)

## 2021-03-08 PROCEDURE — 36415 COLL VENOUS BLD VENIPUNCTURE: CPT | Performed by: INTERNAL MEDICINE

## 2021-03-08 PROCEDURE — 80053 COMPREHEN METABOLIC PANEL: CPT | Performed by: INTERNAL MEDICINE

## 2021-03-08 PROCEDURE — 85025 COMPLETE CBC W/AUTO DIFF WBC: CPT | Performed by: INTERNAL MEDICINE

## 2021-03-11 ENCOUNTER — OFFICE VISIT (OUTPATIENT)
Dept: HEMATOLOGY/ONCOLOGY | Facility: CLINIC | Age: 81
End: 2021-03-11
Payer: MEDICARE

## 2021-03-11 VITALS
WEIGHT: 149.06 LBS | TEMPERATURE: 98 F | BODY MASS INDEX: 23.95 KG/M2 | SYSTOLIC BLOOD PRESSURE: 180 MMHG | HEART RATE: 89 BPM | OXYGEN SATURATION: 95 % | HEIGHT: 66 IN | DIASTOLIC BLOOD PRESSURE: 81 MMHG

## 2021-03-11 DIAGNOSIS — C18.0 CARCINOMA OF CECUM: Primary | ICD-10-CM

## 2021-03-11 DIAGNOSIS — Z09 CHEMOTHERAPY FOLLOW-UP EXAMINATION: ICD-10-CM

## 2021-03-11 PROCEDURE — 1159F MED LIST DOCD IN RCRD: CPT | Mod: S$GLB,,, | Performed by: INTERNAL MEDICINE

## 2021-03-11 PROCEDURE — 3288F FALL RISK ASSESSMENT DOCD: CPT | Mod: CPTII,S$GLB,, | Performed by: INTERNAL MEDICINE

## 2021-03-11 PROCEDURE — 3077F PR MOST RECENT SYSTOLIC BLOOD PRESSURE >= 140 MM HG: ICD-10-PCS | Mod: CPTII,S$GLB,, | Performed by: INTERNAL MEDICINE

## 2021-03-11 PROCEDURE — 3288F PR FALLS RISK ASSESSMENT DOCUMENTED: ICD-10-PCS | Mod: CPTII,S$GLB,, | Performed by: INTERNAL MEDICINE

## 2021-03-11 PROCEDURE — 3079F PR MOST RECENT DIASTOLIC BLOOD PRESSURE 80-89 MM HG: ICD-10-PCS | Mod: CPTII,S$GLB,, | Performed by: INTERNAL MEDICINE

## 2021-03-11 PROCEDURE — 1126F PR PAIN SEVERITY QUANTIFIED, NO PAIN PRESENT: ICD-10-PCS | Mod: S$GLB,,, | Performed by: INTERNAL MEDICINE

## 2021-03-11 PROCEDURE — 3079F DIAST BP 80-89 MM HG: CPT | Mod: CPTII,S$GLB,, | Performed by: INTERNAL MEDICINE

## 2021-03-11 PROCEDURE — 1101F PR PT FALLS ASSESS DOC 0-1 FALLS W/OUT INJ PAST YR: ICD-10-PCS | Mod: CPTII,S$GLB,, | Performed by: INTERNAL MEDICINE

## 2021-03-11 PROCEDURE — 99214 PR OFFICE/OUTPT VISIT, EST, LEVL IV, 30-39 MIN: ICD-10-PCS | Mod: S$GLB,,, | Performed by: INTERNAL MEDICINE

## 2021-03-11 PROCEDURE — 99214 OFFICE O/P EST MOD 30 MIN: CPT | Mod: S$GLB,,, | Performed by: INTERNAL MEDICINE

## 2021-03-11 PROCEDURE — 3077F SYST BP >= 140 MM HG: CPT | Mod: CPTII,S$GLB,, | Performed by: INTERNAL MEDICINE

## 2021-03-11 PROCEDURE — 1101F PT FALLS ASSESS-DOCD LE1/YR: CPT | Mod: CPTII,S$GLB,, | Performed by: INTERNAL MEDICINE

## 2021-03-11 PROCEDURE — 1126F AMNT PAIN NOTED NONE PRSNT: CPT | Mod: S$GLB,,, | Performed by: INTERNAL MEDICINE

## 2021-03-11 PROCEDURE — 99999 PR PBB SHADOW E&M-EST. PATIENT-LVL IV: CPT | Mod: PBBFAC,,, | Performed by: INTERNAL MEDICINE

## 2021-03-11 PROCEDURE — 1159F PR MEDICATION LIST DOCUMENTED IN MEDICAL RECORD: ICD-10-PCS | Mod: S$GLB,,, | Performed by: INTERNAL MEDICINE

## 2021-03-11 PROCEDURE — 99999 PR PBB SHADOW E&M-EST. PATIENT-LVL IV: ICD-10-PCS | Mod: PBBFAC,,, | Performed by: INTERNAL MEDICINE

## 2021-04-18 ENCOUNTER — HOSPITAL ENCOUNTER (EMERGENCY)
Facility: HOSPITAL | Age: 81
Discharge: HOME | End: 2021-04-18
Attending: EMERGENCY MEDICINE
Payer: MEDICARE

## 2021-04-18 VITALS
OXYGEN SATURATION: 97 % | SYSTOLIC BLOOD PRESSURE: 190 MMHG | HEART RATE: 68 BPM | WEIGHT: 155 LBS | TEMPERATURE: 99 F | BODY MASS INDEX: 24.91 KG/M2 | DIASTOLIC BLOOD PRESSURE: 81 MMHG | RESPIRATION RATE: 18 BRPM | HEIGHT: 66 IN

## 2021-04-18 DIAGNOSIS — S09.90XA CLOSED HEAD INJURY, INITIAL ENCOUNTER: ICD-10-CM
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
## 2021-04-18 PROCEDURE — 99284 EMERGENCY DEPT VISIT MOD MDM: CPT | Mod: 25

## 2021-05-24 PROBLEM — Z09 CHEMOTHERAPY FOLLOW-UP EXAMINATION: Status: RESOLVED | Noted: 2021-02-17 | Resolved: 2021-05-24

## 2022-01-01 ENCOUNTER — TELEPHONE (OUTPATIENT)
Dept: HEMATOLOGY/ONCOLOGY | Facility: CLINIC | Age: 82
End: 2022-01-01
Payer: MEDICARE

## 2022-01-01 ENCOUNTER — HOSPITAL ENCOUNTER (INPATIENT)
Facility: HOSPITAL | Age: 82
LOS: 3 days | Discharge: HOSPICE | DRG: 194 | End: 2022-10-07
Attending: EMERGENCY MEDICINE | Admitting: INTERNAL MEDICINE
Payer: MEDICARE

## 2022-01-01 VITALS
HEART RATE: 99 BPM | OXYGEN SATURATION: 92 % | SYSTOLIC BLOOD PRESSURE: 138 MMHG | WEIGHT: 175.06 LBS | RESPIRATION RATE: 20 BRPM | BODY MASS INDEX: 28.14 KG/M2 | HEIGHT: 66 IN | TEMPERATURE: 99 F | DIASTOLIC BLOOD PRESSURE: 83 MMHG

## 2022-01-01 DIAGNOSIS — R07.9 CHEST PAIN, UNSPECIFIED TYPE: ICD-10-CM

## 2022-01-01 DIAGNOSIS — R07.9 CHEST PAIN: ICD-10-CM

## 2022-01-01 DIAGNOSIS — R06.02 SOB (SHORTNESS OF BREATH): ICD-10-CM

## 2022-01-01 DIAGNOSIS — Z71.89 ACP (ADVANCE CARE PLANNING): ICD-10-CM

## 2022-01-01 DIAGNOSIS — E87.6 HYPOKALEMIA: ICD-10-CM

## 2022-01-01 DIAGNOSIS — G89.3 CANCER-RELATED PAIN: ICD-10-CM

## 2022-01-01 DIAGNOSIS — R79.89 ELEVATED TROPONIN LEVEL NOT DUE TO ACUTE CORONARY SYNDROME: ICD-10-CM

## 2022-01-01 DIAGNOSIS — F03.90 DEMENTIA WITHOUT BEHAVIORAL DISTURBANCE: Chronic | ICD-10-CM

## 2022-01-01 DIAGNOSIS — R91.8 MASS OF RIGHT LUNG: Primary | ICD-10-CM

## 2022-01-01 DIAGNOSIS — C18.0 MALIGNANT NEOPLASM OF CECUM: Chronic | ICD-10-CM

## 2022-01-01 LAB
ADEQUACY: NORMAL
AFP SERPL-MCNC: 2.7 NG/ML (ref 0–8.4)
ALBUMIN SERPL BCP-MCNC: 3.1 G/DL (ref 3.5–5.2)
ALBUMIN SERPL BCP-MCNC: 3.1 G/DL (ref 3.5–5.2)
ALBUMIN SERPL BCP-MCNC: 3.2 G/DL (ref 3.5–5.2)
ALBUMIN SERPL BCP-MCNC: 3.2 G/DL (ref 3.5–5.2)
ALLENS TEST: ABNORMAL
ALP SERPL-CCNC: 113 U/L (ref 55–135)
ALP SERPL-CCNC: 119 U/L (ref 55–135)
ALP SERPL-CCNC: 123 U/L (ref 55–135)
ALP SERPL-CCNC: 131 U/L (ref 55–135)
ALT SERPL W/O P-5'-P-CCNC: 61 U/L (ref 10–44)
ALT SERPL W/O P-5'-P-CCNC: 73 U/L (ref 10–44)
ALT SERPL W/O P-5'-P-CCNC: 88 U/L (ref 10–44)
ALT SERPL W/O P-5'-P-CCNC: 92 U/L (ref 10–44)
ANION GAP SERPL CALC-SCNC: 10 MMOL/L (ref 8–16)
ANION GAP SERPL CALC-SCNC: 10 MMOL/L (ref 8–16)
ANION GAP SERPL CALC-SCNC: 11 MMOL/L (ref 8–16)
ANION GAP SERPL CALC-SCNC: 8 MMOL/L (ref 8–16)
ANION GAP SERPL CALC-SCNC: 9 MMOL/L (ref 8–16)
ASCENDING AORTA: 3.3 CM
AST SERPL-CCNC: 37 U/L (ref 10–40)
AST SERPL-CCNC: 39 U/L (ref 10–40)
AST SERPL-CCNC: 43 U/L (ref 10–40)
AST SERPL-CCNC: 44 U/L (ref 10–40)
AV PEAK GRADIENT: 10 MMHG
AV VELOCITY RATIO: 0.75
BACTERIA #/AREA URNS HPF: ABNORMAL /HPF
BACTERIA BLD CULT: NORMAL
BACTERIA BLD CULT: NORMAL
BACTERIA UR CULT: NORMAL
BASOPHILS # BLD AUTO: 0.01 K/UL (ref 0–0.2)
BASOPHILS # BLD AUTO: 0.02 K/UL (ref 0–0.2)
BASOPHILS NFR BLD: 0.1 % (ref 0–1.9)
BASOPHILS NFR BLD: 0.2 % (ref 0–1.9)
BILIRUB SERPL-MCNC: 0.7 MG/DL (ref 0.1–1)
BILIRUB SERPL-MCNC: 0.8 MG/DL (ref 0.1–1)
BILIRUB SERPL-MCNC: 0.8 MG/DL (ref 0.1–1)
BILIRUB SERPL-MCNC: 0.9 MG/DL (ref 0.1–1)
BILIRUB UR QL STRIP: NEGATIVE
BNP SERPL-MCNC: 172 PG/ML (ref 0–99)
BSA FOR ECHO PROCEDURE: 1.92 M2
BUN SERPL-MCNC: 22 MG/DL (ref 8–23)
BUN SERPL-MCNC: 23 MG/DL (ref 8–23)
BUN SERPL-MCNC: 25 MG/DL (ref 8–23)
BUN SERPL-MCNC: 25 MG/DL (ref 8–23)
BUN SERPL-MCNC: 30 MG/DL (ref 8–23)
CALCIUM SERPL-MCNC: 7.2 MG/DL (ref 8.7–10.5)
CALCIUM SERPL-MCNC: 7.4 MG/DL (ref 8.7–10.5)
CALCIUM SERPL-MCNC: 7.6 MG/DL (ref 8.7–10.5)
CHLORIDE SERPL-SCNC: 89 MMOL/L (ref 95–110)
CHLORIDE SERPL-SCNC: 89 MMOL/L (ref 95–110)
CHLORIDE SERPL-SCNC: 90 MMOL/L (ref 95–110)
CHLORIDE SERPL-SCNC: 90 MMOL/L (ref 95–110)
CHLORIDE SERPL-SCNC: 92 MMOL/L (ref 95–110)
CK SERPL-CCNC: 67 U/L (ref 20–180)
CLARITY UR: ABNORMAL
CO2 SERPL-SCNC: 38 MMOL/L (ref 23–29)
CO2 SERPL-SCNC: 41 MMOL/L (ref 23–29)
CO2 SERPL-SCNC: 42 MMOL/L (ref 23–29)
CO2 SERPL-SCNC: 42 MMOL/L (ref 23–29)
CO2 SERPL-SCNC: 45 MMOL/L (ref 23–29)
COLOR UR: YELLOW
CREAT SERPL-MCNC: 0.7 MG/DL (ref 0.5–1.4)
CREAT SERPL-MCNC: 0.7 MG/DL (ref 0.5–1.4)
CREAT SERPL-MCNC: 0.8 MG/DL (ref 0.5–1.4)
CRP SERPL-MCNC: 5.5 MG/L (ref 0–8.2)
CTP QC/QA: YES
CV ECHO LV RWT: 0.63 CM
D DIMER PPP IA.FEU-MCNC: 3.25 MG/L FEU
DELSYS: ABNORMAL
DIFFERENTIAL METHOD: ABNORMAL
DOP CALC AO PEAK VEL: 1.59 M/S
DOP CALC LVOT AREA: 3.1 CM2
DOP CALC LVOT DIAMETER: 1.99 CM
DOP CALC LVOT PEAK VEL: 1.2 M/S
DOP CALC LVOT STROKE VOLUME: 90.46 CM3
DOP CALCLVOT PEAK VEL VTI: 29.1 CM
E WAVE DECELERATION TIME: 253.02 MSEC
E/A RATIO: 0.69
E/E' RATIO: 20.6 M/S
ECHO LV POSTERIOR WALL: 1.12 CM (ref 0.6–1.1)
EJECTION FRACTION: 65 %
EOSINOPHIL # BLD AUTO: 0 K/UL (ref 0–0.5)
EOSINOPHIL NFR BLD: 0.1 % (ref 0–8)
EOSINOPHIL NFR BLD: 0.2 % (ref 0–8)
ERYTHROCYTE [DISTWIDTH] IN BLOOD BY AUTOMATED COUNT: 15.7 % (ref 11.5–14.5)
ERYTHROCYTE [DISTWIDTH] IN BLOOD BY AUTOMATED COUNT: 15.8 % (ref 11.5–14.5)
ERYTHROCYTE [DISTWIDTH] IN BLOOD BY AUTOMATED COUNT: 16.2 % (ref 11.5–14.5)
ERYTHROCYTE [DISTWIDTH] IN BLOOD BY AUTOMATED COUNT: 16.5 % (ref 11.5–14.5)
ERYTHROCYTE [SEDIMENTATION RATE] IN BLOOD BY WESTERGREN METHOD: 5 MM/HR (ref 0–20)
EST. GFR  (NO RACE VARIABLE): >60 ML/MIN/1.73 M^2
ESTIMATED AVG GLUCOSE: 128 MG/DL (ref 68–131)
FINAL PATHOLOGIC DIAGNOSIS: NORMAL
FRACTIONAL SHORTENING: 35 % (ref 28–44)
GLUCOSE SERPL-MCNC: 149 MG/DL (ref 70–110)
GLUCOSE SERPL-MCNC: 155 MG/DL (ref 70–110)
GLUCOSE SERPL-MCNC: 166 MG/DL (ref 70–110)
GLUCOSE SERPL-MCNC: 173 MG/DL (ref 70–110)
GLUCOSE SERPL-MCNC: 176 MG/DL (ref 70–110)
GLUCOSE SERPL-MCNC: 235 MG/DL (ref 70–110)
GLUCOSE UR QL STRIP: NEGATIVE
GROSS: NORMAL
HBA1C MFR BLD: 6.1 % (ref 4–5.6)
HCO3 UR-SCNC: 48.7 MMOL/L (ref 24–28)
HCT VFR BLD AUTO: 37.7 % (ref 37–48.5)
HCT VFR BLD AUTO: 39.8 % (ref 37–48.5)
HCT VFR BLD AUTO: 40.2 % (ref 37–48.5)
HCT VFR BLD AUTO: 41.1 % (ref 37–48.5)
HGB BLD-MCNC: 12.7 G/DL (ref 12–16)
HGB BLD-MCNC: 12.8 G/DL (ref 12–16)
HGB BLD-MCNC: 12.8 G/DL (ref 12–16)
HGB BLD-MCNC: 13 G/DL (ref 12–16)
HGB UR QL STRIP: ABNORMAL
HYALINE CASTS #/AREA URNS LPF: 3 /LPF
IMM GRANULOCYTES # BLD AUTO: 0.07 K/UL (ref 0–0.04)
IMM GRANULOCYTES # BLD AUTO: 0.09 K/UL (ref 0–0.04)
IMM GRANULOCYTES # BLD AUTO: 0.1 K/UL (ref 0–0.04)
IMM GRANULOCYTES # BLD AUTO: 0.11 K/UL (ref 0–0.04)
IMM GRANULOCYTES NFR BLD AUTO: 0.8 % (ref 0–0.5)
IMM GRANULOCYTES NFR BLD AUTO: 1.3 % (ref 0–0.5)
INR PPP: 1.1 (ref 0.8–1.2)
INR PPP: 1.1 (ref 0.8–1.2)
INTERVENTRICULAR SEPTUM: 1.18 CM (ref 0.6–1.1)
IVC DIAMETER: 1.31 CM
IVRT: 103.81 MSEC
KETONES UR QL STRIP: NEGATIVE
LA MAJOR: 5.31 CM
LA MINOR: 5.37 CM
LA WIDTH: 3.8 CM
LEFT ATRIUM SIZE: 3.73 CM
LEFT ATRIUM VOLUME INDEX: 34 ML/M2
LEFT ATRIUM VOLUME: 64.33 CM3
LEFT INTERNAL DIMENSION IN SYSTOLE: 2.34 CM (ref 2.1–4)
LEFT VENTRICLE DIASTOLIC VOLUME INDEX: 28.41 ML/M2
LEFT VENTRICLE DIASTOLIC VOLUME: 53.69 ML
LEFT VENTRICLE MASS INDEX: 70 G/M2
LEFT VENTRICLE SYSTOLIC VOLUME INDEX: 10 ML/M2
LEFT VENTRICLE SYSTOLIC VOLUME: 18.91 ML
LEFT VENTRICULAR INTERNAL DIMENSION IN DIASTOLE: 3.58 CM (ref 3.5–6)
LEFT VENTRICULAR MASS: 131.57 G
LEUKOCYTE ESTERASE UR QL STRIP: ABNORMAL
LV LATERAL E/E' RATIO: 17.17 M/S
LV SEPTAL E/E' RATIO: 25.75 M/S
LVOT MG: 3 MMHG
LVOT MV: 0.8 CM/S
LYMPHOCYTES # BLD AUTO: 0.2 K/UL (ref 1–4.8)
LYMPHOCYTES # BLD AUTO: 0.3 K/UL (ref 1–4.8)
LYMPHOCYTES NFR BLD: 3 % (ref 18–48)
LYMPHOCYTES NFR BLD: 3.3 % (ref 18–48)
LYMPHOCYTES NFR BLD: 3.5 % (ref 18–48)
LYMPHOCYTES NFR BLD: 3.7 % (ref 18–48)
Lab: NORMAL
MAGNESIUM SERPL-MCNC: 1.6 MG/DL (ref 1.6–2.6)
MAGNESIUM SERPL-MCNC: 1.7 MG/DL (ref 1.6–2.6)
MAGNESIUM SERPL-MCNC: 1.7 MG/DL (ref 1.6–2.6)
MAGNESIUM SERPL-MCNC: 1.8 MG/DL (ref 1.6–2.6)
MAGNESIUM SERPL-MCNC: 1.9 MG/DL (ref 1.6–2.6)
MCH RBC QN AUTO: 26.3 PG (ref 27–31)
MCH RBC QN AUTO: 26.6 PG (ref 27–31)
MCH RBC QN AUTO: 26.7 PG (ref 27–31)
MCH RBC QN AUTO: 28 PG (ref 27–31)
MCHC RBC AUTO-ENTMCNC: 31.6 G/DL (ref 32–36)
MCHC RBC AUTO-ENTMCNC: 31.8 G/DL (ref 32–36)
MCHC RBC AUTO-ENTMCNC: 31.9 G/DL (ref 32–36)
MCHC RBC AUTO-ENTMCNC: 34 G/DL (ref 32–36)
MCV RBC AUTO: 83 FL (ref 82–98)
MCV RBC AUTO: 84 FL (ref 82–98)
MICROSCOPIC COMMENT: ABNORMAL
MICROSCOPIC EXAM: NORMAL
MONOCYTES # BLD AUTO: 0.3 K/UL (ref 0.3–1)
MONOCYTES # BLD AUTO: 0.4 K/UL (ref 0.3–1)
MONOCYTES NFR BLD: 4.3 % (ref 4–15)
MONOCYTES NFR BLD: 4.5 % (ref 4–15)
MONOCYTES NFR BLD: 4.7 % (ref 4–15)
MONOCYTES NFR BLD: 4.7 % (ref 4–15)
MV PEAK A VEL: 1.49 M/S
MV PEAK E VEL: 1.03 M/S
MV STENOSIS PRESSURE HALF TIME: 73.38 MS
MV VALVE AREA P 1/2 METHOD: 3 CM2
NEUTROPHILS # BLD AUTO: 6.4 K/UL (ref 1.8–7.7)
NEUTROPHILS # BLD AUTO: 6.9 K/UL (ref 1.8–7.7)
NEUTROPHILS # BLD AUTO: 7.8 K/UL (ref 1.8–7.7)
NEUTROPHILS # BLD AUTO: 8 K/UL (ref 1.8–7.7)
NEUTROPHILS NFR BLD: 90.3 % (ref 38–73)
NEUTROPHILS NFR BLD: 90.3 % (ref 38–73)
NEUTROPHILS NFR BLD: 90.5 % (ref 38–73)
NEUTROPHILS NFR BLD: 91.5 % (ref 38–73)
NITRITE UR QL STRIP: NEGATIVE
NRBC BLD-RTO: 0 /100 WBC
PCO2 BLDA: 58.6 MMHG (ref 35–45)
PH SMN: 7.53 [PH] (ref 7.35–7.45)
PH UR STRIP: 7 [PH] (ref 5–8)
PHOSPHATE SERPL-MCNC: 2.7 MG/DL (ref 2.7–4.5)
PHOSPHATE SERPL-MCNC: 3.1 MG/DL (ref 2.7–4.5)
PHOSPHATE SERPL-MCNC: 3.3 MG/DL (ref 2.7–4.5)
PISA TR MAX VEL: 2.72 M/S
PLATELET # BLD AUTO: 103 K/UL (ref 150–450)
PLATELET # BLD AUTO: 104 K/UL (ref 150–450)
PLATELET # BLD AUTO: 105 K/UL (ref 150–450)
PLATELET # BLD AUTO: 99 K/UL (ref 150–450)
PMV BLD AUTO: 10 FL (ref 9.2–12.9)
PMV BLD AUTO: 10.3 FL (ref 9.2–12.9)
PMV BLD AUTO: 10.3 FL (ref 9.2–12.9)
PMV BLD AUTO: 9.9 FL (ref 9.2–12.9)
PO2 BLDA: 81 MMHG (ref 80–100)
POC BE: 22 MMOL/L
POC SATURATED O2: 97 % (ref 95–100)
POC TCO2: >50 MMOL/L (ref 23–27)
POCT GLUCOSE: 142 MG/DL (ref 70–110)
POCT GLUCOSE: 145 MG/DL (ref 70–110)
POCT GLUCOSE: 145 MG/DL (ref 70–110)
POCT GLUCOSE: 147 MG/DL (ref 70–110)
POCT GLUCOSE: 158 MG/DL (ref 70–110)
POCT GLUCOSE: 168 MG/DL (ref 70–110)
POCT GLUCOSE: 176 MG/DL (ref 70–110)
POCT GLUCOSE: 184 MG/DL (ref 70–110)
POCT GLUCOSE: 194 MG/DL (ref 70–110)
POCT GLUCOSE: 216 MG/DL (ref 70–110)
POCT GLUCOSE: 217 MG/DL (ref 70–110)
POTASSIUM SERPL-SCNC: 2.3 MMOL/L (ref 3.5–5.1)
POTASSIUM SERPL-SCNC: 2.5 MMOL/L (ref 3.5–5.1)
POTASSIUM SERPL-SCNC: 2.8 MMOL/L (ref 3.5–5.1)
POTASSIUM SERPL-SCNC: 3.2 MMOL/L (ref 3.5–5.1)
POTASSIUM SERPL-SCNC: 3.3 MMOL/L (ref 3.5–5.1)
POTASSIUM SERPL-SCNC: 3.4 MMOL/L (ref 3.5–5.1)
PROCALCITONIN SERPL IA-MCNC: 3.27 NG/ML
PROT SERPL-MCNC: 5.3 G/DL (ref 6–8.4)
PROT SERPL-MCNC: 5.4 G/DL (ref 6–8.4)
PROT SERPL-MCNC: 5.5 G/DL (ref 6–8.4)
PROT SERPL-MCNC: 5.5 G/DL (ref 6–8.4)
PROT UR QL STRIP: ABNORMAL
PROTHROMBIN TIME: 11.6 SEC (ref 9–12.5)
PROTHROMBIN TIME: 11.6 SEC (ref 9–12.5)
PV PEAK VELOCITY: 1.16 CM/S
RA MAJOR: 5.33 CM
RA PRESSURE: 3 MMHG
RA WIDTH: 3.4 CM
RBC # BLD AUTO: 4.57 M/UL (ref 4–5.4)
RBC # BLD AUTO: 4.77 M/UL (ref 4–5.4)
RBC # BLD AUTO: 4.79 M/UL (ref 4–5.4)
RBC # BLD AUTO: 4.95 M/UL (ref 4–5.4)
RBC #/AREA URNS HPF: 3 /HPF (ref 0–4)
RIGHT VENTRICULAR END-DIASTOLIC DIMENSION: 3.29 CM
SAMPLE: ABNORMAL
SARS-COV-2 RDRP RESP QL NAA+PROBE: NEGATIVE
SARS-COV-2 RDRP RESP QL NAA+PROBE: NEGATIVE
SINUS: 3.2 CM
SITE: ABNORMAL
SODIUM SERPL-SCNC: 138 MMOL/L (ref 136–145)
SODIUM SERPL-SCNC: 140 MMOL/L (ref 136–145)
SODIUM SERPL-SCNC: 141 MMOL/L (ref 136–145)
SODIUM SERPL-SCNC: 142 MMOL/L (ref 136–145)
SODIUM SERPL-SCNC: 145 MMOL/L (ref 136–145)
SP GR UR STRIP: >1.03 (ref 1–1.03)
SQUAMOUS #/AREA URNS HPF: 7 /HPF
STJ: 2.54 CM
TDI LATERAL: 0.06 M/S
TDI SEPTAL: 0.04 M/S
TDI: 0.05 M/S
TR MAX PG: 30 MMHG
TRICUSPID ANNULAR PLANE SYSTOLIC EXCURSION: 1.8 CM
TROPONIN I SERPL DL<=0.01 NG/ML-MCNC: 0.05 NG/ML (ref 0–0.03)
TROPONIN I SERPL DL<=0.01 NG/ML-MCNC: 0.06 NG/ML (ref 0–0.03)
TROPONIN I SERPL DL<=0.01 NG/ML-MCNC: 0.07 NG/ML (ref 0–0.03)
TV REST PULMONARY ARTERY PRESSURE: 33 MMHG
URN SPEC COLLECT METH UR: ABNORMAL
UROBILINOGEN UR STRIP-ACNC: ABNORMAL EU/DL
WBC # BLD AUTO: 7.06 K/UL (ref 3.9–12.7)
WBC # BLD AUTO: 7.64 K/UL (ref 3.9–12.7)
WBC # BLD AUTO: 8.6 K/UL (ref 3.9–12.7)
WBC # BLD AUTO: 8.73 K/UL (ref 3.9–12.7)
WBC #/AREA URNS HPF: 12 /HPF (ref 0–5)

## 2022-01-01 PROCEDURE — 63600175 PHARM REV CODE 636 W HCPCS: Performed by: INTERNAL MEDICINE

## 2022-01-01 PROCEDURE — 94760 N-INVAS EAR/PLS OXIMETRY 1: CPT

## 2022-01-01 PROCEDURE — 94640 AIRWAY INHALATION TREATMENT: CPT

## 2022-01-01 PROCEDURE — 85652 RBC SED RATE AUTOMATED: CPT | Performed by: INTERNAL MEDICINE

## 2022-01-01 PROCEDURE — 84484 ASSAY OF TROPONIN QUANT: CPT

## 2022-01-01 PROCEDURE — 85025 COMPLETE CBC W/AUTO DIFF WBC: CPT | Performed by: EMERGENCY MEDICINE

## 2022-01-01 PROCEDURE — 21400001 HC TELEMETRY ROOM

## 2022-01-01 PROCEDURE — 82550 ASSAY OF CK (CPK): CPT | Performed by: EMERGENCY MEDICINE

## 2022-01-01 PROCEDURE — 93005 ELECTROCARDIOGRAM TRACING: CPT

## 2022-01-01 PROCEDURE — 99900035 HC TECH TIME PER 15 MIN (STAT)

## 2022-01-01 PROCEDURE — 36415 COLL VENOUS BLD VENIPUNCTURE: CPT | Performed by: INTERNAL MEDICINE

## 2022-01-01 PROCEDURE — 84100 ASSAY OF PHOSPHORUS: CPT | Performed by: INTERNAL MEDICINE

## 2022-01-01 PROCEDURE — 80053 COMPREHEN METABOLIC PANEL: CPT | Performed by: EMERGENCY MEDICINE

## 2022-01-01 PROCEDURE — 83735 ASSAY OF MAGNESIUM: CPT | Performed by: EMERGENCY MEDICINE

## 2022-01-01 PROCEDURE — 88341 IMHCHEM/IMCYTCHM EA ADD ANTB: CPT | Mod: 26,,, | Performed by: PATHOLOGY

## 2022-01-01 PROCEDURE — 99222 PR INITIAL HOSPITAL CARE,LEVL II: ICD-10-PCS | Mod: ,,, | Performed by: PSYCHIATRY & NEUROLOGY

## 2022-01-01 PROCEDURE — 99222 1ST HOSP IP/OBS MODERATE 55: CPT | Mod: ,,, | Performed by: PSYCHIATRY & NEUROLOGY

## 2022-01-01 PROCEDURE — 63600175 PHARM REV CODE 636 W HCPCS: Performed by: STUDENT IN AN ORGANIZED HEALTH CARE EDUCATION/TRAINING PROGRAM

## 2022-01-01 PROCEDURE — 88333 PATH CONSLTJ SURG CYTO XM 1: CPT | Performed by: PATHOLOGY

## 2022-01-01 PROCEDURE — 25500020 PHARM REV CODE 255: Performed by: EMERGENCY MEDICINE

## 2022-01-01 PROCEDURE — 99497 ADVNCD CARE PLAN 30 MIN: CPT | Mod: 25,,, | Performed by: REGISTERED NURSE

## 2022-01-01 PROCEDURE — 25000003 PHARM REV CODE 250: Performed by: REGISTERED NURSE

## 2022-01-01 PROCEDURE — 99233 SBSQ HOSP IP/OBS HIGH 50: CPT | Mod: ,,, | Performed by: REGISTERED NURSE

## 2022-01-01 PROCEDURE — 25000003 PHARM REV CODE 250: Performed by: INTERNAL MEDICINE

## 2022-01-01 PROCEDURE — 36600 WITHDRAWAL OF ARTERIAL BLOOD: CPT

## 2022-01-01 PROCEDURE — 83880 ASSAY OF NATRIURETIC PEPTIDE: CPT | Performed by: EMERGENCY MEDICINE

## 2022-01-01 PROCEDURE — 85025 COMPLETE CBC W/AUTO DIFF WBC: CPT | Performed by: INTERNAL MEDICINE

## 2022-01-01 PROCEDURE — 82962 GLUCOSE BLOOD TEST: CPT

## 2022-01-01 PROCEDURE — 25000003 PHARM REV CODE 250: Performed by: EMERGENCY MEDICINE

## 2022-01-01 PROCEDURE — 84484 ASSAY OF TROPONIN QUANT: CPT | Mod: 91 | Performed by: INTERNAL MEDICINE

## 2022-01-01 PROCEDURE — 63600175 PHARM REV CODE 636 W HCPCS: Performed by: RADIOLOGY

## 2022-01-01 PROCEDURE — 94761 N-INVAS EAR/PLS OXIMETRY MLT: CPT

## 2022-01-01 PROCEDURE — 84484 ASSAY OF TROPONIN QUANT: CPT | Performed by: INTERNAL MEDICINE

## 2022-01-01 PROCEDURE — 25000003 PHARM REV CODE 250: Performed by: STUDENT IN AN ORGANIZED HEALTH CARE EDUCATION/TRAINING PROGRAM

## 2022-01-01 PROCEDURE — 88333 PR  INTRAOPERATIVE CYTO PATH CONSULT, INITIAL SITE: ICD-10-PCS | Mod: 26,,, | Performed by: PATHOLOGY

## 2022-01-01 PROCEDURE — 87635 SARS-COV-2 COVID-19 AMP PRB: CPT | Performed by: EMERGENCY MEDICINE

## 2022-01-01 PROCEDURE — 81000 URINALYSIS NONAUTO W/SCOPE: CPT | Performed by: EMERGENCY MEDICINE

## 2022-01-01 PROCEDURE — 99223 PR INITIAL HOSPITAL CARE,LEVL III: ICD-10-PCS | Mod: ,,, | Performed by: STUDENT IN AN ORGANIZED HEALTH CARE EDUCATION/TRAINING PROGRAM

## 2022-01-01 PROCEDURE — 27000221 HC OXYGEN, UP TO 24 HOURS

## 2022-01-01 PROCEDURE — 88307 TISSUE EXAM BY PATHOLOGIST: CPT | Mod: 26,,, | Performed by: PATHOLOGY

## 2022-01-01 PROCEDURE — 85610 PROTHROMBIN TIME: CPT | Performed by: INTERNAL MEDICINE

## 2022-01-01 PROCEDURE — 96367 TX/PROPH/DG ADDL SEQ IV INF: CPT | Mod: 59

## 2022-01-01 PROCEDURE — 25000242 PHARM REV CODE 250 ALT 637 W/ HCPCS: Performed by: INTERNAL MEDICINE

## 2022-01-01 PROCEDURE — 83735 ASSAY OF MAGNESIUM: CPT | Performed by: INTERNAL MEDICINE

## 2022-01-01 PROCEDURE — 80053 COMPREHEN METABOLIC PANEL: CPT | Performed by: INTERNAL MEDICINE

## 2022-01-01 PROCEDURE — 85379 FIBRIN DEGRADATION QUANT: CPT | Performed by: EMERGENCY MEDICINE

## 2022-01-01 PROCEDURE — 84145 PROCALCITONIN (PCT): CPT | Performed by: EMERGENCY MEDICINE

## 2022-01-01 PROCEDURE — 88341 IMHCHEM/IMCYTCHM EA ADD ANTB: CPT | Mod: 59 | Performed by: PATHOLOGY

## 2022-01-01 PROCEDURE — 88342 IMHCHEM/IMCYTCHM 1ST ANTB: CPT | Performed by: PATHOLOGY

## 2022-01-01 PROCEDURE — 25000003 PHARM REV CODE 250: Performed by: RADIOLOGY

## 2022-01-01 PROCEDURE — 88307 PR  SURG PATH,LEVEL V: ICD-10-PCS | Mod: 26,,, | Performed by: PATHOLOGY

## 2022-01-01 PROCEDURE — 88342 IMHCHEM/IMCYTCHM 1ST ANTB: CPT | Mod: 26,,, | Performed by: PATHOLOGY

## 2022-01-01 PROCEDURE — 96365 THER/PROPH/DIAG IV INF INIT: CPT

## 2022-01-01 PROCEDURE — 84484 ASSAY OF TROPONIN QUANT: CPT | Performed by: EMERGENCY MEDICINE

## 2022-01-01 PROCEDURE — 86140 C-REACTIVE PROTEIN: CPT | Performed by: INTERNAL MEDICINE

## 2022-01-01 PROCEDURE — U0002 COVID-19 LAB TEST NON-CDC: HCPCS | Performed by: STUDENT IN AN ORGANIZED HEALTH CARE EDUCATION/TRAINING PROGRAM

## 2022-01-01 PROCEDURE — 82803 BLOOD GASES ANY COMBINATION: CPT

## 2022-01-01 PROCEDURE — 88341 PR IHC OR ICC EACH ADD'L SINGLE ANTIBODY  STAINPR: ICD-10-PCS | Mod: 26,,, | Performed by: PATHOLOGY

## 2022-01-01 PROCEDURE — 84132 ASSAY OF SERUM POTASSIUM: CPT | Performed by: STUDENT IN AN ORGANIZED HEALTH CARE EDUCATION/TRAINING PROGRAM

## 2022-01-01 PROCEDURE — 83036 HEMOGLOBIN GLYCOSYLATED A1C: CPT | Performed by: INTERNAL MEDICINE

## 2022-01-01 PROCEDURE — 83735 ASSAY OF MAGNESIUM: CPT | Mod: 91 | Performed by: INTERNAL MEDICINE

## 2022-01-01 PROCEDURE — 82105 ALPHA-FETOPROTEIN SERUM: CPT | Performed by: INTERNAL MEDICINE

## 2022-01-01 PROCEDURE — 93010 ELECTROCARDIOGRAM REPORT: CPT | Mod: ,,, | Performed by: INTERNAL MEDICINE

## 2022-01-01 PROCEDURE — 99223 1ST HOSP IP/OBS HIGH 75: CPT | Mod: ,,, | Performed by: INTERNAL MEDICINE

## 2022-01-01 PROCEDURE — 25500020 PHARM REV CODE 255: Performed by: STUDENT IN AN ORGANIZED HEALTH CARE EDUCATION/TRAINING PROGRAM

## 2022-01-01 PROCEDURE — 87086 URINE CULTURE/COLONY COUNT: CPT | Performed by: EMERGENCY MEDICINE

## 2022-01-01 PROCEDURE — 63600175 PHARM REV CODE 636 W HCPCS: Performed by: REGISTERED NURSE

## 2022-01-01 PROCEDURE — 99497 PR ADVNCD CARE PLAN 30 MIN: ICD-10-PCS | Mod: 25,,, | Performed by: REGISTERED NURSE

## 2022-01-01 PROCEDURE — 88333 PATH CONSLTJ SURG CYTO XM 1: CPT | Mod: 26,,, | Performed by: PATHOLOGY

## 2022-01-01 PROCEDURE — 99223 PR INITIAL HOSPITAL CARE,LEVL III: ICD-10-PCS | Mod: ,,, | Performed by: INTERNAL MEDICINE

## 2022-01-01 PROCEDURE — 99223 1ST HOSP IP/OBS HIGH 75: CPT | Mod: ,,, | Performed by: STUDENT IN AN ORGANIZED HEALTH CARE EDUCATION/TRAINING PROGRAM

## 2022-01-01 PROCEDURE — 87040 BLOOD CULTURE FOR BACTERIA: CPT | Performed by: EMERGENCY MEDICINE

## 2022-01-01 PROCEDURE — 99233 PR SUBSEQUENT HOSPITAL CARE,LEVL III: ICD-10-PCS | Mod: ,,, | Performed by: REGISTERED NURSE

## 2022-01-01 PROCEDURE — 96375 TX/PRO/DX INJ NEW DRUG ADDON: CPT | Mod: 59

## 2022-01-01 PROCEDURE — 63600175 PHARM REV CODE 636 W HCPCS: Performed by: EMERGENCY MEDICINE

## 2022-01-01 PROCEDURE — 36415 COLL VENOUS BLD VENIPUNCTURE: CPT | Performed by: STUDENT IN AN ORGANIZED HEALTH CARE EDUCATION/TRAINING PROGRAM

## 2022-01-01 PROCEDURE — 80048 BASIC METABOLIC PNL TOTAL CA: CPT | Mod: XB | Performed by: INTERNAL MEDICINE

## 2022-01-01 PROCEDURE — 88342 CHG IMMUNOCYTOCHEMISTRY: ICD-10-PCS | Mod: 26,,, | Performed by: PATHOLOGY

## 2022-01-01 PROCEDURE — 85347 COAGULATION TIME ACTIVATED: CPT

## 2022-01-01 PROCEDURE — 88307 TISSUE EXAM BY PATHOLOGIST: CPT | Performed by: PATHOLOGY

## 2022-01-01 PROCEDURE — 99223 PR INITIAL HOSPITAL CARE,LEVL III: ICD-10-PCS | Mod: ,,, | Performed by: REGISTERED NURSE

## 2022-01-01 PROCEDURE — 93010 EKG 12-LEAD: ICD-10-PCS | Mod: ,,, | Performed by: INTERNAL MEDICINE

## 2022-01-01 PROCEDURE — 99291 CRITICAL CARE FIRST HOUR: CPT | Mod: 25

## 2022-01-01 PROCEDURE — 99223 1ST HOSP IP/OBS HIGH 75: CPT | Mod: ,,, | Performed by: REGISTERED NURSE

## 2022-01-01 RX ORDER — FEXOFENADINE HCL 60 MG
60 TABLET ORAL DAILY
COMMUNITY

## 2022-01-01 RX ORDER — FLUTICASONE PROPIONATE 50 MCG
1 SPRAY, SUSPENSION (ML) NASAL 2 TIMES DAILY
COMMUNITY

## 2022-01-01 RX ORDER — DIPHENOXYLATE HYDROCHLORIDE AND ATROPINE SULFATE 2.5; .025 MG/1; MG/1
1 TABLET ORAL 2 TIMES DAILY PRN
COMMUNITY

## 2022-01-01 RX ORDER — GLUCAGON 1 MG
1 KIT INJECTION
Status: DISCONTINUED | OUTPATIENT
Start: 2022-01-01 | End: 2022-01-01

## 2022-01-01 RX ORDER — MAG HYDROX/ALUMINUM HYD/SIMETH 200-200-20
30 SUSPENSION, ORAL (FINAL DOSE FORM) ORAL 4 TIMES DAILY PRN
Status: DISCONTINUED | OUTPATIENT
Start: 2022-01-01 | End: 2022-01-01 | Stop reason: HOSPADM

## 2022-01-01 RX ORDER — IBUPROFEN 200 MG
24 TABLET ORAL
Status: DISCONTINUED | OUTPATIENT
Start: 2022-01-01 | End: 2022-01-01 | Stop reason: HOSPADM

## 2022-01-01 RX ORDER — DONEPEZIL HYDROCHLORIDE 10 MG/1
10 TABLET, FILM COATED ORAL NIGHTLY
Status: DISCONTINUED | OUTPATIENT
Start: 2022-01-01 | End: 2022-01-01 | Stop reason: HOSPADM

## 2022-01-01 RX ORDER — IPRATROPIUM BROMIDE AND ALBUTEROL SULFATE 2.5; .5 MG/3ML; MG/3ML
3 SOLUTION RESPIRATORY (INHALATION) EVERY 6 HOURS
Status: DISCONTINUED | OUTPATIENT
Start: 2022-01-01 | End: 2022-01-01 | Stop reason: HOSPADM

## 2022-01-01 RX ORDER — HYDRALAZINE HYDROCHLORIDE 25 MG/1
25 TABLET, FILM COATED ORAL EVERY 8 HOURS
Status: DISCONTINUED | OUTPATIENT
Start: 2022-01-01 | End: 2022-01-01 | Stop reason: HOSPADM

## 2022-01-01 RX ORDER — ONDANSETRON 2 MG/ML
4 INJECTION INTRAMUSCULAR; INTRAVENOUS EVERY 8 HOURS PRN
Status: DISCONTINUED | OUTPATIENT
Start: 2022-01-01 | End: 2022-01-01 | Stop reason: HOSPADM

## 2022-01-01 RX ORDER — POTASSIUM CHLORIDE 7.45 MG/ML
10 INJECTION INTRAVENOUS
Status: COMPLETED | OUTPATIENT
Start: 2022-01-01 | End: 2022-01-01

## 2022-01-01 RX ORDER — POTASSIUM CHLORIDE 7.45 MG/ML
10 INJECTION INTRAVENOUS ONCE
Status: COMPLETED | OUTPATIENT
Start: 2022-01-01 | End: 2022-01-01

## 2022-01-01 RX ORDER — DULOXETIN HYDROCHLORIDE 30 MG/1
30 CAPSULE, DELAYED RELEASE ORAL NIGHTLY
Status: DISCONTINUED | OUTPATIENT
Start: 2022-01-01 | End: 2022-01-01 | Stop reason: HOSPADM

## 2022-01-01 RX ORDER — BENZONATATE 100 MG/1
100 CAPSULE ORAL 3 TIMES DAILY PRN
Status: DISCONTINUED | OUTPATIENT
Start: 2022-01-01 | End: 2022-01-01 | Stop reason: HOSPADM

## 2022-01-01 RX ORDER — ERGOCALCIFEROL 1.25 MG/1
50000 CAPSULE ORAL
Status: DISCONTINUED | OUTPATIENT
Start: 2022-01-01 | End: 2022-01-01 | Stop reason: HOSPADM

## 2022-01-01 RX ORDER — ROPINIROLE 0.25 MG/1
0.25 TABLET, FILM COATED ORAL 2 TIMES DAILY
Status: DISCONTINUED | OUTPATIENT
Start: 2022-01-01 | End: 2022-01-01 | Stop reason: HOSPADM

## 2022-01-01 RX ORDER — OXYCODONE HYDROCHLORIDE 5 MG/1
5 TABLET ORAL EVERY 6 HOURS PRN
Status: DISCONTINUED | OUTPATIENT
Start: 2022-01-01 | End: 2022-01-01 | Stop reason: HOSPADM

## 2022-01-01 RX ORDER — LANOLIN ALCOHOL/MO/W.PET/CERES
1 CREAM (GRAM) TOPICAL DAILY
Status: DISCONTINUED | OUTPATIENT
Start: 2022-01-01 | End: 2022-01-01 | Stop reason: HOSPADM

## 2022-01-01 RX ORDER — MIRTAZAPINE 15 MG/1
30 TABLET, FILM COATED ORAL NIGHTLY
Status: DISCONTINUED | OUTPATIENT
Start: 2022-01-01 | End: 2022-01-01 | Stop reason: HOSPADM

## 2022-01-01 RX ORDER — TALC
6 POWDER (GRAM) TOPICAL NIGHTLY PRN
Status: DISCONTINUED | OUTPATIENT
Start: 2022-01-01 | End: 2022-01-01 | Stop reason: HOSPADM

## 2022-01-01 RX ORDER — ATORVASTATIN CALCIUM 10 MG/1
20 TABLET, FILM COATED ORAL DAILY
Status: DISCONTINUED | OUTPATIENT
Start: 2022-01-01 | End: 2022-01-01 | Stop reason: HOSPADM

## 2022-01-01 RX ORDER — IPRATROPIUM BROMIDE AND ALBUTEROL SULFATE 2.5; .5 MG/3ML; MG/3ML
3 SOLUTION RESPIRATORY (INHALATION) EVERY 4 HOURS PRN
Status: DISCONTINUED | OUTPATIENT
Start: 2022-01-01 | End: 2022-01-01 | Stop reason: HOSPADM

## 2022-01-01 RX ORDER — LISINOPRIL 20 MG/1
40 TABLET ORAL DAILY
Status: DISCONTINUED | OUTPATIENT
Start: 2022-01-01 | End: 2022-01-01

## 2022-01-01 RX ORDER — PENTOXIFYLLINE 400 MG/1
400 TABLET, EXTENDED RELEASE ORAL
Status: DISCONTINUED | OUTPATIENT
Start: 2022-01-01 | End: 2022-01-01 | Stop reason: HOSPADM

## 2022-01-01 RX ORDER — LEVOFLOXACIN 750 MG/1
750 TABLET ORAL DAILY
COMMUNITY
End: 2022-01-01

## 2022-01-01 RX ORDER — SIMETHICONE 80 MG
1 TABLET,CHEWABLE ORAL 4 TIMES DAILY PRN
Status: DISCONTINUED | OUTPATIENT
Start: 2022-01-01 | End: 2022-01-01 | Stop reason: HOSPADM

## 2022-01-01 RX ORDER — IBUPROFEN 200 MG
16 TABLET ORAL
Status: DISCONTINUED | OUTPATIENT
Start: 2022-01-01 | End: 2022-01-01 | Stop reason: HOSPADM

## 2022-01-01 RX ORDER — INSULIN ASPART 100 [IU]/ML
0-5 INJECTION, SOLUTION INTRAVENOUS; SUBCUTANEOUS EVERY 6 HOURS PRN
Status: DISCONTINUED | OUTPATIENT
Start: 2022-01-01 | End: 2022-01-01 | Stop reason: HOSPADM

## 2022-01-01 RX ORDER — GLUCAGON 1 MG
1 KIT INJECTION
Status: DISCONTINUED | OUTPATIENT
Start: 2022-01-01 | End: 2022-01-01 | Stop reason: HOSPADM

## 2022-01-01 RX ORDER — MIDAZOLAM HYDROCHLORIDE 1 MG/ML
INJECTION INTRAMUSCULAR; INTRAVENOUS
Status: COMPLETED | OUTPATIENT
Start: 2022-01-01 | End: 2022-01-01

## 2022-01-01 RX ORDER — PENTOXIFYLLINE 400 MG/1
400 TABLET, EXTENDED RELEASE ORAL
COMMUNITY

## 2022-01-01 RX ORDER — SODIUM CHLORIDE 0.9 % (FLUSH) 0.9 %
10 SYRINGE (ML) INJECTION EVERY 12 HOURS PRN
Status: DISCONTINUED | OUTPATIENT
Start: 2022-01-01 | End: 2022-01-01 | Stop reason: HOSPADM

## 2022-01-01 RX ORDER — AMOXICILLIN 250 MG
1 CAPSULE ORAL 2 TIMES DAILY PRN
Status: DISCONTINUED | OUTPATIENT
Start: 2022-01-01 | End: 2022-01-01 | Stop reason: HOSPADM

## 2022-01-01 RX ORDER — LISINOPRIL 20 MG/1
40 TABLET ORAL DAILY
Status: DISCONTINUED | OUTPATIENT
Start: 2022-01-01 | End: 2022-01-01 | Stop reason: HOSPADM

## 2022-01-01 RX ORDER — HEPARIN SODIUM 5000 [USP'U]/ML
5000 INJECTION, SOLUTION INTRAVENOUS; SUBCUTANEOUS EVERY 8 HOURS
Status: DISCONTINUED | OUTPATIENT
Start: 2022-01-01 | End: 2022-01-01 | Stop reason: HOSPADM

## 2022-01-01 RX ORDER — GABAPENTIN 300 MG/1
300 CAPSULE ORAL 3 TIMES DAILY
Status: DISCONTINUED | OUTPATIENT
Start: 2022-01-01 | End: 2022-01-01 | Stop reason: HOSPADM

## 2022-01-01 RX ORDER — OMEPRAZOLE 20 MG/1
20 CAPSULE, DELAYED RELEASE ORAL DAILY
COMMUNITY

## 2022-01-01 RX ORDER — HYDROMORPHONE HYDROCHLORIDE 1 MG/ML
0.5 INJECTION, SOLUTION INTRAMUSCULAR; INTRAVENOUS; SUBCUTANEOUS
Status: COMPLETED | OUTPATIENT
Start: 2022-01-01 | End: 2022-01-01

## 2022-01-01 RX ORDER — HYDROXYZINE PAMOATE 25 MG/1
25 CAPSULE ORAL NIGHTLY
Status: DISCONTINUED | OUTPATIENT
Start: 2022-01-01 | End: 2022-01-01 | Stop reason: HOSPADM

## 2022-01-01 RX ORDER — ESCITALOPRAM OXALATE 10 MG/1
10 TABLET ORAL DAILY
Status: DISCONTINUED | OUTPATIENT
Start: 2022-01-01 | End: 2022-01-01 | Stop reason: HOSPADM

## 2022-01-01 RX ORDER — SODIUM CHLORIDE 9 MG/ML
INJECTION, SOLUTION INTRAVENOUS
Status: COMPLETED | OUTPATIENT
Start: 2022-01-01 | End: 2022-01-01

## 2022-01-01 RX ORDER — MEMANTINE HYDROCHLORIDE 5 MG/1
5 TABLET ORAL DAILY
Status: DISCONTINUED | OUTPATIENT
Start: 2022-01-01 | End: 2022-01-01 | Stop reason: HOSPADM

## 2022-01-01 RX ORDER — FENTANYL CITRATE 50 UG/ML
INJECTION, SOLUTION INTRAMUSCULAR; INTRAVENOUS
Status: COMPLETED | OUTPATIENT
Start: 2022-01-01 | End: 2022-01-01

## 2022-01-01 RX ORDER — MORPHINE SULFATE 4 MG/ML
2 INJECTION, SOLUTION INTRAMUSCULAR; INTRAVENOUS EVERY 4 HOURS PRN
Status: DISCONTINUED | OUTPATIENT
Start: 2022-01-01 | End: 2022-01-01 | Stop reason: HOSPADM

## 2022-01-01 RX ORDER — ASPIRIN 325 MG
325 TABLET ORAL
Status: COMPLETED | OUTPATIENT
Start: 2022-01-01 | End: 2022-01-01

## 2022-01-01 RX ORDER — LEVOFLOXACIN 5 MG/ML
750 INJECTION, SOLUTION INTRAVENOUS
Status: DISCONTINUED | OUTPATIENT
Start: 2022-01-01 | End: 2022-01-01 | Stop reason: HOSPADM

## 2022-01-01 RX ORDER — GLYCOPYRROLATE 1 MG/1
1 TABLET ORAL 2 TIMES DAILY
Status: DISCONTINUED | OUTPATIENT
Start: 2022-01-01 | End: 2022-01-01 | Stop reason: HOSPADM

## 2022-01-01 RX ORDER — MIRTAZAPINE 30 MG/1
30 TABLET, FILM COATED ORAL NIGHTLY
COMMUNITY

## 2022-01-01 RX ORDER — NALOXONE HCL 0.4 MG/ML
0.02 VIAL (ML) INJECTION
Status: DISCONTINUED | OUTPATIENT
Start: 2022-01-01 | End: 2022-01-01 | Stop reason: HOSPADM

## 2022-01-01 RX ORDER — POLYETHYLENE GLYCOL 3350 17 G/17G
17 POWDER, FOR SOLUTION ORAL DAILY
Status: DISCONTINUED | OUTPATIENT
Start: 2022-01-01 | End: 2022-01-01 | Stop reason: HOSPADM

## 2022-01-01 RX ORDER — GUAIFENESIN/DEXTROMETHORPHAN 100-10MG/5
10 SYRUP ORAL EVERY 6 HOURS
Status: DISPENSED | OUTPATIENT
Start: 2022-01-01 | End: 2022-01-01

## 2022-01-01 RX ORDER — MAGNESIUM SULFATE 1 G/100ML
1 INJECTION INTRAVENOUS
Status: COMPLETED | OUTPATIENT
Start: 2022-01-01 | End: 2022-01-01

## 2022-01-01 RX ORDER — PANTOPRAZOLE SODIUM 40 MG/1
40 TABLET, DELAYED RELEASE ORAL DAILY
Status: DISCONTINUED | OUTPATIENT
Start: 2022-01-01 | End: 2022-01-01 | Stop reason: HOSPADM

## 2022-01-01 RX ORDER — NAPROXEN SODIUM 220 MG/1
81 TABLET, FILM COATED ORAL DAILY
Status: DISCONTINUED | OUTPATIENT
Start: 2022-01-01 | End: 2022-01-01 | Stop reason: HOSPADM

## 2022-01-01 RX ORDER — PROCHLORPERAZINE EDISYLATE 5 MG/ML
5 INJECTION INTRAMUSCULAR; INTRAVENOUS EVERY 6 HOURS PRN
Status: DISCONTINUED | OUTPATIENT
Start: 2022-01-01 | End: 2022-01-01 | Stop reason: HOSPADM

## 2022-01-01 RX ORDER — CHOLECALCIFEROL (VITAMIN D3) 25 MCG
1000 TABLET ORAL DAILY
Status: DISCONTINUED | OUTPATIENT
Start: 2022-01-01 | End: 2022-01-01 | Stop reason: HOSPADM

## 2022-01-01 RX ORDER — OXYCODONE HYDROCHLORIDE 5 MG/1
5 TABLET ORAL EVERY 6 HOURS PRN
Status: DISCONTINUED | OUTPATIENT
Start: 2022-01-01 | End: 2022-01-01

## 2022-01-01 RX ORDER — ACETAMINOPHEN 325 MG/1
650 TABLET ORAL EVERY 4 HOURS PRN
Status: DISCONTINUED | OUTPATIENT
Start: 2022-01-01 | End: 2022-01-01 | Stop reason: HOSPADM

## 2022-01-01 RX ORDER — LABETALOL HYDROCHLORIDE 5 MG/ML
10 INJECTION, SOLUTION INTRAVENOUS
Status: COMPLETED | OUTPATIENT
Start: 2022-01-01 | End: 2022-01-01

## 2022-01-01 RX ADMIN — FERROUS SULFATE TAB 325 MG (65 MG ELEMENTAL FE) 1 EACH: 325 (65 FE) TAB at 09:10

## 2022-01-01 RX ADMIN — CHOLECALCIFEROL TAB 25 MCG (1000 UNIT) 1000 UNITS: 25 TAB at 08:10

## 2022-01-01 RX ADMIN — ASPIRIN 325 MG ORAL TABLET 325 MG: 325 PILL ORAL at 02:10

## 2022-01-01 RX ADMIN — GLYCOPYRROLATE 1 MG: 1 TABLET ORAL at 09:10

## 2022-01-01 RX ADMIN — HYDROXYZINE PAMOATE 25 MG: 25 CAPSULE ORAL at 09:10

## 2022-01-01 RX ADMIN — HYDROXYZINE PAMOATE 25 MG: 25 CAPSULE ORAL at 11:10

## 2022-01-01 RX ADMIN — MEMANTINE HYDROCHLORIDE 5 MG: 5 TABLET ORAL at 09:10

## 2022-01-01 RX ADMIN — SODIUM CHLORIDE 250 ML: 0.9 INJECTION, SOLUTION INTRAVENOUS at 11:10

## 2022-01-01 RX ADMIN — POLYETHYLENE GLYCOL 3350 17 G: 17 POWDER, FOR SOLUTION ORAL at 03:10

## 2022-01-01 RX ADMIN — PENTOXIFYLLINE 400 MG: 400 TABLET, EXTENDED RELEASE ORAL at 06:10

## 2022-01-01 RX ADMIN — GABAPENTIN 300 MG: 300 CAPSULE ORAL at 04:10

## 2022-01-01 RX ADMIN — GLYCOPYRROLATE 1 MG: 1 TABLET ORAL at 03:10

## 2022-01-01 RX ADMIN — ASPIRIN 81 MG CHEWABLE TABLET 81 MG: 81 TABLET CHEWABLE at 03:10

## 2022-01-01 RX ADMIN — HYDRALAZINE HYDROCHLORIDE 25 MG: 25 TABLET, FILM COATED ORAL at 05:10

## 2022-01-01 RX ADMIN — HEPARIN SODIUM 5000 UNITS: 5000 INJECTION INTRAVENOUS; SUBCUTANEOUS at 11:10

## 2022-01-01 RX ADMIN — ASPIRIN 81 MG CHEWABLE TABLET 81 MG: 81 TABLET CHEWABLE at 09:10

## 2022-01-01 RX ADMIN — HEPARIN SODIUM 5000 UNITS: 5000 INJECTION INTRAVENOUS; SUBCUTANEOUS at 05:10

## 2022-01-01 RX ADMIN — PENTOXIFYLLINE 400 MG: 400 TABLET, EXTENDED RELEASE ORAL at 01:10

## 2022-01-01 RX ADMIN — POTASSIUM BICARBONATE 25 MEQ: 977.5 TABLET, EFFERVESCENT ORAL at 09:10

## 2022-01-01 RX ADMIN — POTASSIUM BICARBONATE 25 MEQ: 977.5 TABLET, EFFERVESCENT ORAL at 03:10

## 2022-01-01 RX ADMIN — IPRATROPIUM BROMIDE AND ALBUTEROL SULFATE 3 ML: 2.5; .5 SOLUTION RESPIRATORY (INHALATION) at 01:10

## 2022-01-01 RX ADMIN — LABETALOL HYDROCHLORIDE 10 MG: 5 INJECTION INTRAVENOUS at 04:10

## 2022-01-01 RX ADMIN — POTASSIUM BICARBONATE 40 MEQ: 391 TABLET, EFFERVESCENT ORAL at 03:10

## 2022-01-01 RX ADMIN — PENTOXIFYLLINE 400 MG: 400 TABLET, EXTENDED RELEASE ORAL at 07:10

## 2022-01-01 RX ADMIN — OXYCODONE 5 MG: 5 TABLET ORAL at 03:10

## 2022-01-01 RX ADMIN — IPRATROPIUM BROMIDE AND ALBUTEROL SULFATE 3 ML: 2.5; .5 SOLUTION RESPIRATORY (INHALATION) at 12:10

## 2022-01-01 RX ADMIN — MIRTAZAPINE 30 MG: 15 TABLET, FILM COATED ORAL at 09:10

## 2022-01-01 RX ADMIN — DONEPEZIL HYDROCHLORIDE 10 MG: 10 TABLET ORAL at 09:10

## 2022-01-01 RX ADMIN — PENTOXIFYLLINE 400 MG: 400 TABLET, EXTENDED RELEASE ORAL at 08:10

## 2022-01-01 RX ADMIN — HYDROMORPHONE HYDROCHLORIDE 0.5 MG: 1 INJECTION, SOLUTION INTRAMUSCULAR; INTRAVENOUS; SUBCUTANEOUS at 06:10

## 2022-01-01 RX ADMIN — GLYCOPYRROLATE 1 MG: 1 TABLET ORAL at 11:10

## 2022-01-01 RX ADMIN — GUAIFENESIN AND DEXTROMETHORPHAN 10 ML: 100; 10 SYRUP ORAL at 06:10

## 2022-01-01 RX ADMIN — ESCITALOPRAM OXALATE 10 MG: 10 TABLET ORAL at 09:10

## 2022-01-01 RX ADMIN — GABAPENTIN 300 MG: 300 CAPSULE ORAL at 09:10

## 2022-01-01 RX ADMIN — IPRATROPIUM BROMIDE AND ALBUTEROL SULFATE 3 ML: 2.5; .5 SOLUTION RESPIRATORY (INHALATION) at 07:10

## 2022-01-01 RX ADMIN — OXYCODONE 5 MG: 5 TABLET ORAL at 11:10

## 2022-01-01 RX ADMIN — ROPINIROLE HYDROCHLORIDE 0.25 MG: 0.25 TABLET, FILM COATED ORAL at 11:10

## 2022-01-01 RX ADMIN — LISINOPRIL 40 MG: 20 TABLET ORAL at 11:10

## 2022-01-01 RX ADMIN — POTASSIUM CHLORIDE 10 MEQ: 7.46 INJECTION, SOLUTION INTRAVENOUS at 02:10

## 2022-01-01 RX ADMIN — GUAIFENESIN AND DEXTROMETHORPHAN 10 ML: 100; 10 SYRUP ORAL at 11:10

## 2022-01-01 RX ADMIN — HEPARIN SODIUM 5000 UNITS: 5000 INJECTION INTRAVENOUS; SUBCUTANEOUS at 03:10

## 2022-01-01 RX ADMIN — IOHEXOL 85 ML: 350 INJECTION, SOLUTION INTRAVENOUS at 09:10

## 2022-01-01 RX ADMIN — LISINOPRIL 40 MG: 20 TABLET ORAL at 03:10

## 2022-01-01 RX ADMIN — PENTOXIFYLLINE 400 MG: 400 TABLET, EXTENDED RELEASE ORAL at 09:10

## 2022-01-01 RX ADMIN — HYDRALAZINE HYDROCHLORIDE 25 MG: 25 TABLET, FILM COATED ORAL at 09:10

## 2022-01-01 RX ADMIN — VANCOMYCIN HYDROCHLORIDE 1750 MG: 10 INJECTION, POWDER, LYOPHILIZED, FOR SOLUTION INTRAVENOUS at 07:10

## 2022-01-01 RX ADMIN — ATORVASTATIN CALCIUM 20 MG: 10 TABLET, FILM COATED ORAL at 09:10

## 2022-01-01 RX ADMIN — OXYCODONE 5 MG: 5 TABLET ORAL at 09:10

## 2022-01-01 RX ADMIN — MAGNESIUM SULFATE HEPTAHYDRATE 1 G: 1 INJECTION, SOLUTION INTRAVENOUS at 03:10

## 2022-01-01 RX ADMIN — ROPINIROLE HYDROCHLORIDE 0.25 MG: 0.25 TABLET, FILM COATED ORAL at 03:10

## 2022-01-01 RX ADMIN — PIPERACILLIN AND TAZOBACTAM 4.5 G: 4; .5 INJECTION, POWDER, LYOPHILIZED, FOR SOLUTION INTRAVENOUS; PARENTERAL at 06:10

## 2022-01-01 RX ADMIN — MEMANTINE HYDROCHLORIDE 5 MG: 5 TABLET ORAL at 03:10

## 2022-01-01 RX ADMIN — HEPARIN SODIUM 5000 UNITS: 5000 INJECTION INTRAVENOUS; SUBCUTANEOUS at 09:10

## 2022-01-01 RX ADMIN — FERROUS SULFATE TAB 325 MG (65 MG ELEMENTAL FE) 1 EACH: 325 (65 FE) TAB at 03:10

## 2022-01-01 RX ADMIN — LEVOFLOXACIN 750 MG: 750 INJECTION, SOLUTION INTRAVENOUS at 10:10

## 2022-01-01 RX ADMIN — POTASSIUM BICARBONATE 25 MEQ: 977.5 TABLET, EFFERVESCENT ORAL at 02:10

## 2022-01-01 RX ADMIN — MORPHINE SULFATE 2 MG: 4 INJECTION INTRAVENOUS at 12:10

## 2022-01-01 RX ADMIN — GUAIFENESIN AND DEXTROMETHORPHAN 10 ML: 100; 10 SYRUP ORAL at 05:10

## 2022-01-01 RX ADMIN — CEFTRIAXONE 1 G: 1 INJECTION, SOLUTION INTRAVENOUS at 04:10

## 2022-01-01 RX ADMIN — INSULIN ASPART 2 UNITS: 100 INJECTION, SOLUTION INTRAVENOUS; SUBCUTANEOUS at 06:10

## 2022-01-01 RX ADMIN — LISINOPRIL 40 MG: 20 TABLET ORAL at 09:10

## 2022-01-01 RX ADMIN — ATORVASTATIN CALCIUM 20 MG: 10 TABLET, FILM COATED ORAL at 03:10

## 2022-01-01 RX ADMIN — PANTOPRAZOLE SODIUM 40 MG: 40 TABLET, DELAYED RELEASE ORAL at 03:10

## 2022-01-01 RX ADMIN — GABAPENTIN 300 MG: 300 CAPSULE ORAL at 03:10

## 2022-01-01 RX ADMIN — DULOXETINE 30 MG: 30 CAPSULE, DELAYED RELEASE ORAL at 09:10

## 2022-01-01 RX ADMIN — FENTANYL CITRATE 50 MCG: 50 INJECTION INTRAMUSCULAR; INTRAVENOUS at 11:10

## 2022-01-01 RX ADMIN — HYDRALAZINE HYDROCHLORIDE 25 MG: 25 TABLET, FILM COATED ORAL at 03:10

## 2022-01-01 RX ADMIN — POTASSIUM CHLORIDE 10 MEQ: 7.46 INJECTION, SOLUTION INTRAVENOUS at 12:10

## 2022-01-01 RX ADMIN — DULOXETINE 30 MG: 30 CAPSULE, DELAYED RELEASE ORAL at 11:10

## 2022-01-01 RX ADMIN — CHOLECALCIFEROL TAB 25 MCG (1000 UNIT) 1000 UNITS: 25 TAB at 09:10

## 2022-01-01 RX ADMIN — POTASSIUM CHLORIDE 10 MEQ: 7.46 INJECTION, SOLUTION INTRAVENOUS at 11:10

## 2022-01-01 RX ADMIN — PANTOPRAZOLE SODIUM 40 MG: 40 TABLET, DELAYED RELEASE ORAL at 09:10

## 2022-01-01 RX ADMIN — LEVOFLOXACIN 750 MG: 750 INJECTION, SOLUTION INTRAVENOUS at 12:10

## 2022-01-01 RX ADMIN — POTASSIUM CHLORIDE 10 MEQ: 7.46 INJECTION, SOLUTION INTRAVENOUS at 10:10

## 2022-01-01 RX ADMIN — MIRTAZAPINE 30 MG: 15 TABLET, FILM COATED ORAL at 11:10

## 2022-01-01 RX ADMIN — CHOLECALCIFEROL TAB 25 MCG (1000 UNIT) 1000 UNITS: 25 TAB at 03:10

## 2022-01-01 RX ADMIN — SIMETHICONE 80 MG: 80 TABLET, CHEWABLE ORAL at 03:10

## 2022-01-01 RX ADMIN — ACETAMINOPHEN 650 MG: 325 TABLET ORAL at 07:10

## 2022-01-01 RX ADMIN — ROPINIROLE HYDROCHLORIDE 0.25 MG: 0.25 TABLET, FILM COATED ORAL at 09:10

## 2022-01-01 RX ADMIN — GABAPENTIN 300 MG: 300 CAPSULE ORAL at 10:10

## 2022-01-01 RX ADMIN — HYDRALAZINE HYDROCHLORIDE 25 MG: 25 TABLET, FILM COATED ORAL at 11:10

## 2022-01-01 RX ADMIN — DONEPEZIL HYDROCHLORIDE 10 MG: 10 TABLET ORAL at 11:10

## 2022-01-01 RX ADMIN — IOHEXOL 75 ML: 350 INJECTION, SOLUTION INTRAVENOUS at 03:10

## 2022-01-01 RX ADMIN — POTASSIUM CHLORIDE 10 MEQ: 7.46 INJECTION, SOLUTION INTRAVENOUS at 04:10

## 2022-01-01 RX ADMIN — MIDAZOLAM HYDROCHLORIDE 1 MG: 1 INJECTION, SOLUTION INTRAMUSCULAR; INTRAVENOUS at 11:10

## 2022-01-01 RX ADMIN — POTASSIUM CHLORIDE 10 MEQ: 7.46 INJECTION, SOLUTION INTRAVENOUS at 01:10

## 2022-01-01 RX ADMIN — AZITHROMYCIN MONOHYDRATE 500 MG: 500 INJECTION, POWDER, LYOPHILIZED, FOR SOLUTION INTRAVENOUS at 04:10

## 2022-01-01 RX ADMIN — LEVOFLOXACIN 750 MG: 750 INJECTION, SOLUTION INTRAVENOUS at 11:10

## 2022-01-01 RX ADMIN — PENTOXIFYLLINE 400 MG: 400 TABLET, EXTENDED RELEASE ORAL at 03:10

## 2022-01-01 RX ADMIN — ERGOCALCIFEROL 50000 UNITS: 1.25 CAPSULE ORAL at 09:10

## 2022-01-01 RX ADMIN — ESCITALOPRAM OXALATE 10 MG: 10 TABLET ORAL at 03:10

## 2022-10-04 PROBLEM — K70.30 ALCOHOLIC CIRRHOSIS OF LIVER WITHOUT ASCITES: Status: ACTIVE | Noted: 2022-01-01

## 2022-10-04 NOTE — ED PROVIDER NOTES
"Encounter Date: 10/4/2022    SCRIBE #1 NOTE: I, Ladan Francois, am scribing for, and in the presence of,  Kassandra Mercado MD.     History     Chief Complaint   Patient presents with    Shortness of Breath     Sob, chest pain, body aches, "uncontrollable shaking" x2 days. Pt was 89% on room air. Pt is from [facility]. aaox4     Annette Fallon is a 82 y.o. female with a PMHx of HTN, cardiac arrhythmia, GERD, colon cancer, blindness so who presents to the ED c/o severe, constant SOB. Pt has the associated symptoms of back pain, chest pain, body aches, burning skin on the finger and tremors. She has a history of neuralgia.  She reports the pain is sharp and stabbing and radiates from her chest into her back.  She denies any nausea, vomiting, cough, fever, lightheadedness, diaphoresis.  She does report some shortness of breath.  She reports the pain is "like a knot."She does not wear oxygen at home was noted to be hypoxic here.  No other exacerbating or alleviating factors. Pt denies nausea, vomiting, runny nose, congestion, or cough. Pt reports staying a a nursing facility where she was given 750mg of Levoquin for pneumonitis. Patient denies any EtOH, tobacco, or illicit drug use. Patient reports a PSHx of hysterectomy, appendectomy. Lyrica drug allergy.     Of note, pt stares that she does not have a living will per nursing records and with speaking with patient she is DNR, she does not want compressions, intubation. She is okay with IVF, antibiotics.                                                               Per  patient has been declining  since moving to the nursing home 1 year ago.  She is now mostly bed-bound.                                                                         The history is provided by the patient. No  was used.   Review of patient's allergies indicates:   Allergen Reactions    Lyrica [pregabalin]      Past Medical History:   Diagnosis Date    Arthritis  "    Blind in both eyes     Cancer     Colon cancer     Depression     GERD (gastroesophageal reflux disease)     History of radiofrequency ablation procedure for cardiac arrhythmia     Hypertension     Memory loss     Osteoporosis     Palpitations     Port-A-Cath in place 2020    Right    Sleep apnea     Stroke-like symptoms 2021    slurred speech, BLE weakness (Lt greater than Rt)     Past Surgical History:   Procedure Laterality Date    APPENDECTOMY       SECTION      x2    COLON SURGERY  2020    CYSTOSCOPY WITH URETEROSCOPY, RETROGRADE PYELOGRAPHY, AND INSERTION OF STENT Bilateral 2020    Procedure: CYSTOSCOPY, WITH RETROGRADE PYELOGRAM AND URETERAL STENT INSERTION;  Surgeon: Toni Nguyen MD;  Location: [facility] OR;  Service: Urology;  Laterality: Bilateral;    EYE SURGERY Left     RETINA SURGERY OD    ENUCLEATION  OS    FRACTURE SURGERY Left     Hip, hardware present    FRACTURE SURGERY Left     Ankle    HYSTERECTOMY      INSERTION OF TUNNELED CENTRAL VENOUS CATHETER (CVC) WITH SUBCUTANEOUS PORT N/A 2020    Procedure: INSERTION, PORT-A-CATH;  Surgeon: Ranjit Ott III, MD;  Location: [facility] OR;  Service: General;  Laterality: N/A;  PRE-OP BY RN 2020---COVID NEGATIVE ON     KNEE SURGERY Left     LAPAROSCOPIC RIGHT COLON RESECTION Right 2020    Procedure: COLECTOMY, RIGHT, LAPAROSCOPIC;  Surgeon: Ranjit Ott III, MD;  Location: [facility] OR;  Service: General;  Laterality: Right;  COMBINED SURGERY WITH DR. NGUYEN  COVID NEGATIVE 20    ROTATOR CUFF REPAIR      L arm    TONSILLECTOMY       No family history on file.  Social History     Tobacco Use    Smoking status: Former     Packs/day: 0.50     Types: Cigarettes     Quit date: 2019     Years since quitting: 3.4    Smokeless tobacco: Never   Substance Use Topics    Alcohol use: No    Drug use: No     Review of Systems   Constitutional:  Negative for chills, diaphoresis and fever.   Eyes:   Negative for photophobia and visual disturbance.   Respiratory:  Positive for shortness of breath. Negative for cough.    Cardiovascular:  Positive for chest pain. Negative for leg swelling.   Gastrointestinal:  Negative for abdominal pain, blood in stool, constipation, diarrhea, nausea and vomiting.   Genitourinary:  Negative for dysuria, flank pain, frequency, hematuria and urgency.   Musculoskeletal:  Negative for neck pain and neck stiffness.   Skin:  Negative for rash and wound.   Neurological:  Positive for tremors and headaches. Negative for weakness, light-headedness and numbness.   Psychiatric/Behavioral:  Negative for confusion and suicidal ideas.    All other systems reviewed and are negative.    Physical Exam     Initial Vitals [10/04/22 1303]   BP Pulse Resp Temp SpO2   129/88 82 20 98.3 °F (36.8 °C) 95 %      MAP       --         Physical Exam    Nursing note and vitals reviewed.  Constitutional: She appears well-developed and well-nourished. She is not diaphoretic. She appears distressed.   HENT:   Head: Normocephalic and atraumatic.   Mouth/Throat: Oropharynx is clear and moist. Mucous membranes are dry. No oropharyngeal exudate.   Eyes: Conjunctivae and EOM are normal. Pupils are equal, round, and reactive to light. Right eye exhibits no discharge. Left eye exhibits no discharge.   Neck: Neck supple. No JVD present.   Normal range of motion.  Cardiovascular:  Normal rate, regular rhythm, normal heart sounds and intact distal pulses.     Exam reveals no gallop and no friction rub.       No murmur heard.  Pulmonary/Chest: No respiratory distress. She has no wheezes (right lung base). She has no rhonchi. She has rales.   SPO2 87% on RA, patient on 2L NC with SPO2 94 %   Abdominal: Abdomen is soft. Bowel sounds are normal. She exhibits no distension. There is no abdominal tenderness.   Negative Cintron's sign, no CVA tenderness   There is no rebound and no guarding.   Musculoskeletal:         General: No  tenderness or edema.      Cervical back: Normal range of motion and neck supple.      Comments: Hands are slightly erythematous, no peeling     Lymphadenopathy:     She has no cervical adenopathy.   Neurological: She is alert and oriented to person, place, and time. She has normal strength. She displays tremor (3/5 strength). No cranial nerve deficit or sensory deficit. GCS score is 15. GCS eye subscore is 4. GCS verbal subscore is 5. GCS motor subscore is 6.   Moves all extremities and carries on conversation. CN- II: PERRL; III/IV/VI: EOMI w/out evidence of nystagmus; V: no deficits appreciated to light touch bilateral face; VII: no facial weakness, no facial asymmetry. Eyebrow raise symmetric. Smile symmetric; IX/X: palate midline, and raises symmetrically; XI: shoulder shrug 5/5 bilaterally; XII: tongue is midline w/out asymmetry. Strength 5/5 to bilateral upper and lower extremities, sensation intact to light touch,   Skin: Skin is warm and dry. Capillary refill takes less than 2 seconds.   No sacral decubitus noted    Psychiatric: She has a normal mood and affect. Thought content normal.       ED Course   Critical Care    Date/Time: 10/4/2022 10:00 PM  Performed by: Kassandra Mercado MD  Authorized by: Micheline Holcomb,    Direct patient critical care time: 35 minutes  Additional history critical care time: 5 minutes  Ordering / reviewing critical care time: 10 minutes  Documentation critical care time: 10 minutes  Consulting other physicians critical care time: 10 minutes  Total critical care time (exclusive of procedural time) : 70 minutes  Critical care was necessary to treat or prevent imminent or life-threatening deterioration of the following conditions: respiratory failure.  Critical care was time spent personally by me on the following activities: development of treatment plan with patient or surrogate, discussions with consultants, interpretation of cardiac output measurements, evaluation of patient's  response to treatment, examination of patient, obtaining history from patient or surrogate, ordering and performing treatments and interventions, ordering and review of laboratory studies, ordering and review of radiographic studies, pulse oximetry, re-evaluation of patient's condition and review of old charts.      Labs Reviewed   B-TYPE NATRIURETIC PEPTIDE - Abnormal; Notable for the following components:       Result Value     (*)     All other components within normal limits   CBC W/ AUTO DIFFERENTIAL - Abnormal; Notable for the following components:    RDW 15.8 (*)     Platelets 99 (*)     Immature Granulocytes 0.8 (*)     Gran # (ANC) 8.0 (*)     Immature Grans (Abs) 0.07 (*)     Lymph # 0.3 (*)     Gran % 91.5 (*)     Lymph % 3.0 (*)     All other components within normal limits   COMPREHENSIVE METABOLIC PANEL - Abnormal; Notable for the following components:    Potassium 2.3 (*)     Chloride 92 (*)     CO2 45 (*)     Glucose 173 (*)     BUN 30 (*)     Calcium 7.6 (*)     Total Protein 5.3 (*)     Albumin 3.1 (*)     AST 44 (*)     ALT 92 (*)     All other components within normal limits    Narrative:     K and CO2 critical result(s) called and verbal readback obtained from   Madonna Crowell RN  by [facility] 10/04/2022 14:51   D DIMER, QUANTITATIVE - Abnormal; Notable for the following components:    D-Dimer 3.25 (*)     All other components within normal limits   TROPONIN I - Abnormal; Notable for the following components:    Troponin I 0.063 (*)     All other components within normal limits   URINALYSIS, REFLEX TO URINE CULTURE - Abnormal; Notable for the following components:    Appearance, UA Hazy (*)     Specific Gravity, UA >1.030 (*)     Protein, UA 2+ (*)     Occult Blood UA 1+ (*)     Urobilinogen, UA 4.0-6.0 (*)     Leukocytes, UA 2+ (*)     All other components within normal limits    Narrative:     Specimen Source->Urine   URINALYSIS MICROSCOPIC - Abnormal; Notable for the following components:     WBC, UA 12 (*)     Hyaline Casts, UA 3 (*)     All other components within normal limits    Narrative:     Specimen Source->Urine   PROCALCITONIN - Abnormal; Notable for the following components:    Procalcitonin 3.27 (*)     All other components within normal limits   POCT GLUCOSE MONITORING CONTINUOUS - Abnormal; Notable for the following components:    POC Glucose 176 (*)     All other components within normal limits   POCT GLUCOSE - Abnormal; Notable for the following components:    POCT Glucose 176 (*)     All other components within normal limits   CULTURE, URINE   CULTURE, BLOOD   CULTURE, BLOOD   CULTURE, RESPIRATORY   MAGNESIUM   CK   BASIC METABOLIC PANEL   MAGNESIUM   TROPONIN I   PROTIME-INR   AFP TUMOR MARKER   SEDIMENTATION RATE   C-REACTIVE PROTEIN   PROTIME-INR   SARS-COV-2 RDRP GENE        ECG Results              EKG 12-lead (Final result)  Result time 10/04/22 18:50:13      Final result by Interface, Lab In [facility] (10/04/22 18:50:13)                   Narrative:    Test Reason : R06.02,    Vent. Rate : 075 BPM     Atrial Rate : 075 BPM     P-R Int : 154 ms          QRS Dur : 098 ms      QT Int : 452 ms       P-R-T Axes : 048 -02 053 degrees     QTc Int : 504 ms    Sinus rhythm with Premature supraventricular complexes  Septal infarct (cited on or before 31-OCT-2020)  Abnormal ECG  When compared with ECG of 24-FEB-2021 15:16,  Significant changes have occurred  Confirmed by Zhen Lima MD ([id number]) on 10/4/2022 6:50:06 PM    Referred By: [email or web address]   SELF           Confirmed By:Zhen Lima MD                                  Imaging Results               CTA Chest Non-Coronary (PE Studies) (Final result)  Result time 10/04/22 17:20:36      Final result by Tutu Dale MD (10/04/22 17:20:36)                   Impression:      1. No evidence of pulmonary embolism.  2. Large mass involving the right middle lobe and adjacent right lower lobe measuring 5.3 x 7.6 x 4.4 cm likely representing a  neoplastic process/metastatic disease with mild adjacent atelectasis and consolidation of the right middle lobe.  Follow-up recommended.  3. Right hilar and mediastinal adenopathy suggesting metastatic disease.  4. Moderate right pleural effusion.  5. Cirrhotic changes of the liver.  6. Multiple hypodense lesions throughout the liver suspicious for metastatic disease.  7. Probable mild chronic compression fractures of T11, T12 and L1.  8.  This report was flagged in Epic as abnormal.      Electronically signed by: Tutu Mcadams  Date:    10/04/2022  Time:    17:20               Narrative:    EXAMINATION:  CTA CHEST NON CORONARY (PE STUDIES)    CLINICAL HISTORY:  Pulmonary embolism (PE) suspected, positive D-dimer; , history of colon/cecum carcinoma    TECHNIQUE:  Low dose axial images, sagittal and coronal reformations were obtained from the thoracic inlet to the lung bases following the IV administration of 75 mL of Omnipaque 350.  Contrast timing was optimized to evaluate the pulmonary arteries.  MIP images were performed.    COMPARISON:  None    FINDINGS:  Pulmonary arteries:Pulmonary arteries are adequately enhanced.No filling defects to indicate pulmonary embolism.    Thoracic soft tissues: Unremarkable.    Aorta: Left-sided aortic arch.  No aneurysm or dissection.    Heart: Normal size. No effusion.    Kiah/Mediastinum: Right anterior paratracheal lymph node measures 2.4 cm on axial 163.  Left paratracheal node measures 1.9 cm on axial 176.  Subcarinal lymph node measures 2.3 cm on axial 221    Airways: Patent.    Lungs/Pleura: There is a large mass involving the right middle lobe and adjacent superior margin of the right lower lobe measuring approximately 5.3 x 7.6 x 4.4 cm likely representing a neoplastic process.  There is adjacent mild atelectasis and consolidation of the right middle lobe.    The mass is continuous with right hilar and adjacent mediastinal bulky adenopathy consistent with metastatic  disease.  The mass surrounds multiple subsegmental pulmonary arteries.  Focal narrowing of a proximal right lower lobe subsegmental pulmonary artery best seen on coronal 125 of series 601.    Moderate right pleural effusion.    Esophagus: Unremarkable.    Upper Abdomen: Cirrhotic changes of the liver.  Multiple hypodense lesions throughout the liver are suspicious for metastatic disease.    Bones: Mild compression fractures of T11, T12 and L1 are likely chronic.  No underlying lesion is detected on limited characterization.  Recommend clinical correlation.  This involves the superior endplate of T11 and T12 and the inferior endplate of L1.                                        X-Ray Chest 1 View (Final result)  Result time 10/04/22 15:35:37   Procedure changed from X-Ray Chest PA And Lateral     Final result by Diaz Vazquez MD (10/04/22 15:35:37)                   Impression:      Interval development of 5.7 cm opacity in the right hilar region that could represent malignancy versus consolidation from pneumonia or aspiration.  Further evaluation with CT of the chest is recommended.    This report was flagged in Epic as abnormal..      Electronically signed by: Diaz Vazquez MD  Date:    10/04/2022  Time:    15:35               Narrative:    EXAMINATION:  XR CHEST 1 VIEW    CLINICAL HISTORY:  SOB (shortness of breath); Shortness of breath    TECHNIQUE:  Single frontal view of the chest was performed.    COMPARISON:  None    FINDINGS:  There is a right chest Port-A-Cath device with tip of catheter projected over SVC.  Trachea is patent.  Cardiac silhouette is normal in size.  There is atherosclerosis.  Interval development of central opacity measuring approximately 5.7 cm noted in the right mid/lower lung zone.  There is mild blunting of the CP angle on the right.  No pneumothorax or free air below the diaphragm.  The osseous structures demonstrate no acute finding.                                       CT Head  Without Contrast (Final result)  Result time 10/04/22 15:13:24      Final result by Tutu Dale MD (10/04/22 15:13:24)                   Impression:      1. No acute intracranial process.  2. Involutional changes with chronic microvascular ischemic changes.      Electronically signed by: Tutu Dale  Date:    10/04/2022  Time:    15:13               Narrative:    EXAMINATION:  CT HEAD WITHOUT CONTRAST    CLINICAL HISTORY:  Mental status change, unknown cause;tremors, generalized weakness;    TECHNIQUE:  Low dose axial CT images obtained throughout the head without intravenous contrast. Sagittal and coronal reconstructions were performed.    COMPARISON:  04/18/2021    FINDINGS:  Intracranial compartment:    Ventricles and sulci are normal in size for age without evidence of hydrocephalus. No extra-axial blood or fluid collections.    Moderate involutional changes and chronic microvascular ischemic changes in the periventricular white matter.  No parenchymal mass, hemorrhage, edema or major vascular distribution infarct.    Skull/extracranial contents (limited evaluation): No fracture. Mastoid air cells and paranasal sinuses are essentially clear.    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  Postop changes of the right globe.    No significant change.                                       Medications   albuterol-ipratropium 2.5 mg-0.5 mg/3 mL nebulizer solution 3 mL (has no administration in time range)   albuterol-ipratropium 2.5 mg-0.5 mg/3 mL nebulizer solution 3 mL (has no administration in time range)   atorvastatin tablet 20 mg (has no administration in time range)   donepeziL tablet 10 mg (has no administration in time range)   DULoxetine DR capsule 30 mg (has no administration in time range)   ergocalciferol capsule 50,000 Units (has no administration in time range)   EScitalopram oxalate tablet 10 mg (has no administration in time range)   ferrous sulfate tablet 1 each (has no  administration in time range)   gabapentin capsule 300 mg (has no administration in time range)   glycopyrrolate tablet 1 mg (has no administration in time range)   hydrOXYzine pamoate capsule 25 mg (has no administration in time range)   lisinopriL tablet 40 mg (has no administration in time range)   memantine tablet 5 mg (has no administration in time range)   mirtazapine tablet 30 mg (has no administration in time range)   pantoprazole EC tablet 40 mg (has no administration in time range)   pentoxifylline CR tablet 400 mg (has no administration in time range)   rOPINIRole tablet 0.25 mg (has no administration in time range)   tiZANidine tablet 10 mg (has no administration in time range)   vitamin D 1000 units tablet 1,000 Units (has no administration in time range)   sodium chloride 0.9% flush 10 mL (has no administration in time range)   naloxone 0.4 mg/mL injection 0.02 mg (has no administration in time range)   glucose chewable tablet 16 g (has no administration in time range)   glucose chewable tablet 24 g (has no administration in time range)   dextrose 50% injection 12.5 g (has no administration in time range)   dextrose 50% injection 25 g (has no administration in time range)   glucagon (human recombinant) injection 1 mg (has no administration in time range)   heparin (porcine) injection 5,000 Units (has no administration in time range)   acetaminophen tablet 650 mg (has no administration in time range)   oxyCODONE immediate release tablet 5 mg (has no administration in time range)   senna-docusate 8.6-50 mg per tablet 1 tablet (has no administration in time range)   polyethylene glycol packet 17 g (has no administration in time range)   ondansetron injection 4 mg (has no administration in time range)   prochlorperazine injection Soln 5 mg (has no administration in time range)   melatonin tablet 6 mg (has no administration in time range)   aluminum-magnesium hydroxide-simethicone 200-200-20 mg/5 mL  suspension 30 mL (has no administration in time range)   simethicone chewable tablet 80 mg (has no administration in time range)   aspirin tablet 325 mg (325 mg Oral Given 10/4/22 1411)   potassium bicarbonate disintegrating tablet 40 mEq (40 mEq Oral Given 10/4/22 1519)   magnesium sulfate in dextrose IVPB (premix) 1 g (0 g Intravenous Stopped 10/4/22 1622)   potassium chloride 10 mEq in 100 mL IVPB (0 mEq Intravenous Stopped 10/4/22 1735)   iohexoL (OMNIPAQUE 350) injection 75 mL (75 mLs Intravenous Given 10/4/22 1536)   labetaloL injection 10 mg (10 mg Intravenous Given 10/4/22 1611)   cefTRIAXone (ROCEPHIN) 1 g/50 mL D5W IVPB (0 g Intravenous Stopped 10/4/22 1648)   azithromycin 500 mg in dextrose 5 % 250 mL IVPB (ready to mix system) (0 mg Intravenous Stopped 10/4/22 1807)   HYDROmorphone injection 0.5 mg (0.5 mg Intravenous Given 10/4/22 1811)   vancomycin (VANCOCIN) 1,750 mg in dextrose 5 % 500 mL IVPB (1,750 mg Intravenous New Bag 10/4/22 1919)   piperacillin-tazobactam 4.5 g in dextrose 5 % 100 mL IVPB (ready to mix system) (0 g Intravenous Stopped 10/4/22 1912)     Medical Decision Making:   History:   Old Medical Records: I decided to obtain old medical records.  Clinical Tests:   Lab Tests: Ordered and Reviewed  Radiological Study: Ordered and Reviewed  Medical Tests: Reviewed and Ordered     MDM  82-year-old female presents from nursing home for tremors, shortness of breath, noted to be hypoxic on room air.  She also reports chest pain radiating to her back.  Differential diagnosis includes was not limited to ACS, PE, pneumonia, malignancy, metabolic derangement, hypokalemia, hypocalcemia, intracranial hemorrhage, psychosis.  Labs concerning for potassium of 2.3, 40 mEq p.o. and 10 mEq IV were given.  Patient does have prolonged QTC with T-waves on her EKG.  This is likely the cause of her tremors.  Chest x-ray concerning for prior 0.7 opacity in the right hilar region.  D-dimer elevated so CT PE was  obtained which shows a large mass involving the right middle lobe likely representing neoplastic process metastatic disease.  She also has right hilar mediastinal adenopathy concerning for metastatic disease and multiple hypodense lesions within the liver suspicious for metastatic disease.  She has chronic compression fractures of T11-T12 and L1.  CPK was within normal limits.  Troponin was mildly elevated.  I suspect the patient's pain is more related to her cancer and postobstructive pneumonia.  CO2 was 45 with low protein, low albumin, low calcium.  ABG was ordered.  I discussed with patient's these results including likelihood that she has metastatic cancer and pneumonia.  She is in agreement with admission to the hospital for IV antibiotics and further workup and care.  Case discussed with Dr. May who agrees with admitting patient.     Scribe Attestation:   Scribe #1: I performed the above scribed service and the documentation accurately describes the services I performed. I attest to the accuracy of the note.      ED Course as of 10/04/22 2159   Tue Oct 04, 2022   1355 EKG 12-lead  Sinus rhythm with PVCs at 75.  No ST elevation or depression.  T-wave flattening in V1 and V2.  Normal axis.  QTC is 504.  U-waves present. [JT]      ED Course User Index  [JT] Kassandra Mercado MD               I, Kassandra Mercado personally performed the services described in this documentation. All medical record entries made by the scribe were at my direction and in my presence. I have reviewed the chart and agree that the record reflects my personal performance and is accurate and complete.    Clinical Impression:   Final diagnoses:  [R06.02] SOB (shortness of breath)  [E87.6] Hypokalemia        ED Disposition Condition    Admit                 Kassandra Mercado MD  10/04/22 3778

## 2022-10-05 PROBLEM — R07.9 CHEST PAIN: Status: ACTIVE | Noted: 2020-10-16

## 2022-10-05 PROBLEM — E87.6 HYPOKALEMIA: Status: RESOLVED | Noted: 2021-02-24 | Resolved: 2022-01-01

## 2022-10-05 PROBLEM — R06.02 SOB (SHORTNESS OF BREATH): Status: ACTIVE | Noted: 2022-01-01

## 2022-10-05 PROBLEM — C18.0: Status: RESOLVED | Noted: 2020-12-09 | Resolved: 2022-01-01

## 2022-10-05 PROBLEM — R79.89 ELEVATED TROPONIN LEVEL NOT DUE TO ACUTE CORONARY SYNDROME: Status: ACTIVE | Noted: 2022-01-01

## 2022-10-05 PROBLEM — R07.9 CHEST PAIN: Status: RESOLVED | Noted: 2020-10-16 | Resolved: 2022-01-01

## 2022-10-05 PROBLEM — C18.0: Status: RESOLVED | Noted: 2020-11-12 | Resolved: 2022-01-01

## 2022-10-05 PROBLEM — J96.12 CHRONIC RESPIRATORY FAILURE WITH HYPERCAPNIA: Status: ACTIVE | Noted: 2022-01-01

## 2022-10-05 PROBLEM — R06.02 SOB (SHORTNESS OF BREATH): Status: RESOLVED | Noted: 2022-01-01 | Resolved: 2022-01-01

## 2022-10-05 PROBLEM — J18.9 POSTOBSTRUCTIVE PNEUMONIA: Status: ACTIVE | Noted: 2022-01-01

## 2022-10-05 NOTE — ASSESSMENT & PLAN NOTE
I do not suspect ACS  Likely demand from lung mass and +/- PNA  Trop is flat thus far  Check TTE     Latest Reference Range & Units 10/04/22 14:11 10/04/22 22:50   Troponin I 0.000 - 0.026 ng/mL 0.063 (H) 0.066 (H)

## 2022-10-05 NOTE — PLAN OF CARE
Problem: Adult Inpatient Plan of Care  Goal: Plan of Care Review  Outcome: Ongoing, Progressing     Problem: Coping Ineffective  Goal: Effective Coping  Outcome: Ongoing, Progressing     Problem: Infection  Goal: Absence of Infection Signs and Symptoms  Outcome: Ongoing, Progressing  Intervention: Prevent or Manage Infection  Flowsheets (Taken 10/5/2022 0614)  Infection Management: cultures obtained and sent to lab     Problem: Fluid Imbalance (Pneumonia)  Goal: Fluid Balance  Outcome: Ongoing, Progressing     Problem: Skin Injury Risk Increased  Goal: Skin Health and Integrity  Outcome: Ongoing, Progressing     Problem: Fall Injury Risk  Goal: Absence of Fall and Fall-Related Injury  Outcome: Ongoing, Progressing

## 2022-10-05 NOTE — PLAN OF CARE
Problem: Adult Inpatient Plan of Care  Goal: Plan of Care Review  Outcome: Ongoing, Progressing  Goal: Patient-Specific Goal (Individualized)  Outcome: Ongoing, Progressing  Goal: Absence of Hospital-Acquired Illness or Injury  Outcome: Ongoing, Progressing  Goal: Optimal Comfort and Wellbeing  Outcome: Ongoing, Progressing  Goal: Readiness for Transition of Care  Outcome: Ongoing, Progressing     Problem: Coping Ineffective  Goal: Effective Coping  Outcome: Ongoing, Progressing     Problem: Infection  Goal: Absence of Infection Signs and Symptoms  Outcome: Ongoing, Progressing     Problem: Infection (Pneumonia)  Goal: Resolution of Infection Signs and Symptoms  Outcome: Ongoing, Progressing     Problem: Skin Injury Risk Increased  Goal: Skin Health and Integrity  Outcome: Ongoing, Progressing     Problem: Fall Injury Risk  Goal: Absence of Fall and Fall-Related Injury  Outcome: Ongoing, Progressing

## 2022-10-05 NOTE — CONSULTS
[facility] - Telemetry  Neurology  Consult Note    Patient Name: Annette Fallon  MRN: [id number]  Admission Date: 10/4/2022  Hospital Length of Stay: 1 days  Code Status: DNR   Attending Provider: Oscar Doan III, MD   Consulting Provider: Marco Antonio Ernst MD  Primary Care Physician: Willa Friedman  Principal Problem:Mass of right lung    Inpatient consult to Neurology  Consult performed by: Marco Antonio Ernst MD  Consult ordered by: Oscar Doan III, MD      Subjective:     Chief Complaint:  Tremors    HPI: 83 y/o female with a PMHx of HTN, GERD, colon cancer, blindness presents to the ED for SOB. Pt found to have a lung mass. Pulmonary and  Heme/Onc have been consutled. Ms. Fallon is also complaining of back pain, chest pain, body aches, burning skin on the finger and tremors. Tremors started with her back pain. She reports the pain is sharp and stabbing and radiates from her chest into her back.  No speech disturbances, unilateral weakness or numbness    Past Medical History:   Diagnosis Date    Arthritis     Blind in both eyes     Cancer     Colon cancer     Depression     GERD (gastroesophageal reflux disease)     History of radiofrequency ablation procedure for cardiac arrhythmia     Hypertension     Memory loss     Osteoporosis     Palpitations     Port-A-Cath in place 2020    Right    Sleep apnea     Stroke-like symptoms 2021    slurred speech, BLE weakness (Lt greater than Rt)       Past Surgical History:   Procedure Laterality Date    APPENDECTOMY       SECTION      x2    COLON SURGERY  2020    CYSTOSCOPY WITH URETEROSCOPY, RETROGRADE PYELOGRAPHY, AND INSERTION OF STENT Bilateral 2020    Procedure: CYSTOSCOPY, WITH RETROGRADE PYELOGRAM AND URETERAL STENT INSERTION;  Surgeon: Toni Nguyen MD;  Location: [facility];  Service: Urology;  Laterality: Bilateral;    EYE SURGERY Left     RETINA SURGERY OD    ENUCLEATION  OS    FRACTURE SURGERY Left     Hip, hardware present     FRACTURE SURGERY Left     Ankle    HYSTERECTOMY      INSERTION OF TUNNELED CENTRAL VENOUS CATHETER (CVC) WITH SUBCUTANEOUS PORT N/A 12/17/2020    Procedure: INSERTION, PORT-A-CATH;  Surgeon: Ranjit Ott III, MD;  Location: [facility] OR;  Service: General;  Laterality: N/A;  PRE-OP BY RN 12----COVID NEGATIVE ON 12/16    KNEE SURGERY Left     LAPAROSCOPIC RIGHT COLON RESECTION Right 11/12/2020    Procedure: COLECTOMY, RIGHT, LAPAROSCOPIC;  Surgeon: Ranjit Ott III, MD;  Location: [facility] OR;  Service: General;  Laterality: Right;  COMBINED SURGERY WITH DR. VALERA  COVID NEGATIVE 11/9/20    ROTATOR CUFF REPAIR      L arm    TONSILLECTOMY         Review of patient's allergies indicates:   Allergen Reactions    Lyrica [pregabalin]        Current Neurological Medications:     No current facility-administered medications on file prior to encounter.     Current Outpatient Medications on File Prior to Encounter   Medication Sig    alendronate (FOSAMAX) 70 MG tablet Take 70 mg by mouth every 7 days.    atorvastatin (LIPITOR) 10 MG tablet Take 20 mg by mouth once daily.     donepezil (ARICEPT) 10 MG tablet Take 10 mg by mouth every evening.    ergocalciferol (ERGOCALCIFEROL) 50,000 unit Cap Take 50,000 Units by mouth every 7 days.    ferrous sulfate 325 (65 FE) MG EC tablet Take 1 tablet (325 mg total) by mouth once daily.    fexofenadine (ALLEGRA) 60 MG tablet Take 60 mg by mouth once daily.    fluticasone propionate (FLONASE) 50 mcg/actuation nasal spray 1 spray by Each Nostril route 2 (two) times a day.    gabapentin (NEURONTIN) 100 MG capsule Take 300 mg by mouth 3 (three) times daily.     glycopyrrolate (ROBINUL) 1 mg Tab Take 1 mg by mouth 2 (two) times daily.    HYDROcodone-acetaminophen (NORCO) 7.5-325 mg per tablet Take 1 tablet by mouth 3 (three) times daily as needed.    hydrOXYzine pamoate (VISTARIL) 50 MG Cap Take 25 mg by mouth every evening.    lisinopril (PRINIVIL,ZESTRIL) 40 MG tablet Take 40  mg by mouth once daily.    memantine (NAMENDA) 5 MG Tab Take 5 mg by mouth once daily.    mirtazapine (REMERON) 30 MG tablet Take 30 mg by mouth every evening.    omeprazole (PRILOSEC) 20 MG capsule Take 20 mg by mouth once daily.    tizanidine HCl (TIZANIDINE ORAL) Take 10 mg by mouth every 12 (twelve) hours as needed.    calcium carbonate (OS-JOSÉ ANTONIO) 500 mg calcium (1,250 mg) tablet Take 1 tablet by mouth once daily.    diphenoxylate-atropine 2.5-0.025 mg (LOMOTIL) 2.5-0.025 mg per tablet Take 1 tablet by mouth 2 (two) times daily as needed for Diarrhea.    DULoxetine (CYMBALTA) 30 MG capsule Take 30 mg by mouth nightly.    escitalopram oxalate (LEXAPRO) 10 MG tablet Take 10 mg by mouth once daily.    famotidine (PEPCID) 20 MG tablet Take 1 tablet (20 mg total) by mouth 2 (two) times daily.    HYDROcodone-acetaminophen (NORCO)  mg per tablet Take 1 tablet by mouth every 4 (four) hours as needed for Pain. (Patient not taking: Reported on 10/4/2022)    ibuprofen (ADVIL,MOTRIN) 800 MG tablet Take 800 mg by mouth 2 (two) times daily.    levoFLOXacin (LEVAQUIN) 750 MG tablet Take 750 mg by mouth once daily.    pentoxifylline (TRENTAL) 400 mg TbSR Take 400 mg by mouth 3 (three) times daily with meals.    ropinirole (REQUIP) 0.25 MG tablet Take 0.25 mg by mouth 2 (two) times daily.    vitamin D (VITAMIN D3) 1000 units Tab Take 1,000 Units by mouth once daily.      Family History    None       Tobacco Use    Smoking status: Former     Packs/day: 0.50     Types: Cigarettes     Quit date: 4/6/2019     Years since quitting: 3.5    Smokeless tobacco: Never   Substance and Sexual Activity    Alcohol use: No    Drug use: No    Sexual activity: Not Currently     Review of Systems   Constitutional:  Positive for fatigue. Negative for fever.   HENT:  Negative for trouble swallowing.    Eyes:  Negative for photophobia.   Respiratory:  Positive for shortness of breath.    Cardiovascular:  Negative for chest pain.    Gastrointestinal:  Negative for abdominal pain.   Genitourinary:  Negative for flank pain.   Musculoskeletal:  Positive for back pain.   Neurological:  Positive for tremors.   Psychiatric/Behavioral:  Negative for hallucinations.      Objective:     Vital Signs (Most Recent):  Temp: 98.2 °F (36.8 °C) (10/05/22 1201)  Pulse: 72 (10/05/22 1201)  Resp: 18 (10/05/22 1202)  BP: (!) 175/82 (10/05/22 1201)  SpO2: (!) 93 % (10/05/22 1201)   Vital Signs (24h Range):  Temp:  [97.4 °F (36.3 °C)-98.7 °F (37.1 °C)] 98.2 °F (36.8 °C)  Pulse:  [60-82] 72  Resp:  [15-35] 18  SpO2:  [91 %-97 %] 93 %  BP: (129-208)/(77-98) 175/82     Weight: 79.4 kg (175 lb 0.7 oz)  Body mass index is 28.25 kg/m².    Physical Exam  Constitutional:       General: She is in pain  HENT:      Head: Normocephalic.   Eyes:      General:         Right eye: No discharge.         Left eye: No discharge.   Cardiovascular:      Rate and Rhythm: Normal rate.   Pulmonary:      Breath sounds: Normal breath sounds.   Abdominal:      Palpations: Abdomen is soft.   Musculoskeletal:         General: No tenderness.      Cervical back: No tenderness.   Neurological:      Mental Status: She is oriented to person, place, and time.      Deep Tendon Reflexes: Strength normal.   Psychiatric:         Speech: Speech normal.            NEUROLOGICAL EXAMINATION:      MENTAL STATUS   Oriented to person, place, and time.   Speech: speech is normal   Level of consciousness: alert     CRANIAL NERVES      CN V   Right facial sensation deficit: none  Left facial sensation deficit: none     CN VII   Right facial weakness: none  Left facial weakness: none     CN IX, X   Palate: symmetric     CN XII   Tongue deviation: none       Pt is blind      MOTOR EXAM      Strength   Strength 5/5 throughout.     Coarse tremors in UE's and trunk     SENSORY EXAM   Right arm light touch: normal  Left arm light touch: normal  Right leg light touch: normal  Left leg light touch: normal        Significant Labs: CBC:   Recent Labs   Lab 10/04/22  1411 10/05/22  0434 10/06/22  0400   WBC 8.73 8.60 7.06   HGB 12.8 12.8 12.7   HCT 37.7 40.2 39.8   PLT 99* 105* 104*     CMP:   Recent Labs   Lab 10/04/22  1411 10/04/22  2250 10/05/22  0434 10/06/22  0400   * 235* 149* 166*    142 141 140   K 2.3* 2.8* 3.2* 2.5*   CL 92* 89* 89* 90*   CO2 45* 42* 42* 41*   BUN 30* 25* 25* 23   CREATININE 0.8 0.8 0.8 0.7   CALCIUM 7.6* 7.2* 7.4* 7.4*   MG 1.6 1.7 1.7 1.8   PROT 5.3*  --  5.4* 5.5*   ALBUMIN 3.1*  --  3.2* 3.2*   BILITOT 0.8  --  0.7 0.8   ALKPHOS 123  --  131 119   AST 44*  --  43* 37   ALT 92*  --  88* 73*   ANIONGAP 8 11 10 9       Significant Imaging: I have reviewed all pertinent imaging results/findings within the past 24 hours.    CT head  Impression:     1. No acute intracranial process.  2. Involutional changes with chronic microvascular ischemic changes.        Electronically signed by: Tutu Mcadams  Date:                                            10/04/2022  Time:                                           15:13    Assessment and Plan:     83 y/o female consulted for tremors    Tremors: coarse tremors could be as response to severe pain? On exam no motor or sensory deficits. Head CT with no acute abnormalities.  Morphine 2 mg IV x 1 to see if improves.  May use diazepam 5 mg as well x 1.    Active Diagnoses:    Diagnosis Date Noted POA    PRINCIPAL PROBLEM:  Mass of right lung [R91.8] 03/20/2019 Yes    Elevated troponin level not due to acute coronary syndrome [R77.8] 10/05/2022 Yes    Chronic respiratory failure with hypercapnia [J96.12] 10/05/2022 Yes    Postobstructive pneumonia [J18.9] 10/05/2022 Yes    Alcoholic cirrhosis of liver without ascites [K70.30] 10/04/2022 Yes    Hypokalemia [E87.6] 02/24/2021 Yes    Malignant neoplasm of cecum [C18.0] 12/17/2020 Yes     Chronic    Dementia without behavioral disturbance [F03.90] 10/25/2017 Yes     Chronic    Essential hypertension  [I10] 10/25/2017 Yes     Chronic      Problems Resolved During this Admission:    Diagnosis Date Noted Date Resolved POA    SOB (shortness of breath) [R06.02] 10/05/2022 10/05/2022 Unknown    Chest pain [R07.9] 10/16/2020 10/05/2022 Unknown       VTE Risk Mitigation (From admission, onward)           Ordered     heparin (porcine) injection 5,000 Units  Every 8 hours         10/04/22 2156     Place ZARINA hose  Until discontinued         10/04/22 2156     IP VTE HIGH RISK PATIENT  Once         10/04/22 2156     Place sequential compression device  Until discontinued         10/04/22 2156                    Thank you for your consult. I will follow-up with patient. Please contact us if you have any additional questions.    Marco Antonio Ernst MD  Neurology  [facility] - Telemetry

## 2022-10-05 NOTE — ASSESSMENT & PLAN NOTE
Treatment Summary   Plan Name: OP FOLFOX 6 Q2W  Treatment Goal: Curative  Status: Active  Start Date: 2/2/2021  End Date: 7/15/2021 (Planned)  Provider: Khushbu Walden MD  Chemotherapy: fluorouraciL injection 770 mg, 400 mg/m2 = 770 mg, Intravenous, Clinic/HOD 1 time, 2 of 12 cycles  Administration: 770 mg (2/2/2021)  fluorouracil (ADRUCIL) 2,400 mg/m2 = 4,610 mg in sodium chloride 0.9% 240 mL chemo infusion, 2,400 mg/m2 = 4,610 mg, Intravenous, Over 46 hours, 2 of 12 cycles  Administration: 4,610 mg (2/2/2021)  leucovorin calcium 400 mg/m2 = 770 mg in dextrose 5 % 250 mL infusion, 400 mg/m2 = 770 mg, Intravenous, Clinic/HOD 1 time, 2 of 12 cycles  Administration: 770 mg (2/2/2021)  oxaliplatin (ELOXATIN) 85 mg/m2 = 163 mg in dextrose 5 % 500 mL chemo infusion, 85 mg/m2 = 163 mg, Intravenous, Clinic/HOD 1 time, 2 of 12 cycles  Administration: 163 mg (2/2/2021)

## 2022-10-05 NOTE — TELEPHONE ENCOUNTER
Patient name : Vasyl Bryant  MRN : [id number]  YOB: 1940  Room # : 340  Admitting Dr. : Franki  Dx : Lung mass, Liver Mets (new diagnosis)      Given to Dr Martins

## 2022-10-05 NOTE — H&P
"[facility]  History & Physical    Patient Name: Annette Fallon  MRN: [id number]  Patient Class: IP- Inpatient  Admission Date: 10/4/2022  Attending Physician: Micheline Holcomb DO   [facility]         Patient information was obtained from patient, past medical records and ER records.     Subjective:     Principal Problem:Mass of right lung    Chief Complaint:   Chief Complaint   Patient presents with    Shortness of Breath     Sob, chest pain, body aches, "uncontrollable shaking" x2 days. Pt was 89% on room air. Pt is from formerly [facility]. aaox4        HPI: 81 y/o female with a significant medical history of blindness, anxiety/depression, GERD, osteoporosis, malignant Colon CA, HTN, HLD, iron deficiency anemia, neuralgia presents to the hospital for evaluation of back pain, chest pain, SOB, and weakness that began "a few days ago."    The patient resides at [facility] and states she uses a walker at baseline.  She states she has been having severe back pain that has made it increasingly difficult for her to ambulate.  She states when she was using her walker she did not get SOB but she only had to walk a short distance to get to the restroom in her room.  She is not on home oxygen at baseline.  She points to the center of her chest as the area of her pain and describes it "burning".  She denies fever, cough, numbness, N/V/D.      She endorses dysuria, HA, and lightheadedness.  Currently the patient is awake and oriented x 4.  She endorses back pain, 5/10 at this time.  She also has epigastric "burning".  She has movement and sensation in all extremities.  ED workup included an elevated D-dimer (3.25), low K+ (2.3), UA positive for UTI, and an abnormal CTA Chest that revealed a 5.3 x 7.6 x 4.4 cm mass in the right middle and lower lobe along with cirrhotic changes and multiple lesions in the liver and probable chronic compression fractures " of T11, 12, and L1.    She was given Dilaudid, Vanc, Zosyn in the ED.  She is satting 95% on 2L NC O2.      Past Medical History:   Diagnosis Date    Arthritis     Blind in both eyes     Cancer     Colon cancer     Depression     GERD (gastroesophageal reflux disease)     History of radiofrequency ablation procedure for cardiac arrhythmia     Hypertension     Memory loss     Osteoporosis     Palpitations     Port-A-Cath in place 2020    Right    Sleep apnea     Stroke-like symptoms 2021    slurred speech, BLE weakness (Lt greater than Rt)       Past Surgical History:   Procedure Laterality Date    APPENDECTOMY       SECTION      x2    COLON SURGERY  2020    CYSTOSCOPY WITH URETEROSCOPY, RETROGRADE PYELOGRAPHY, AND INSERTION OF STENT Bilateral 2020    Procedure: CYSTOSCOPY, WITH RETROGRADE PYELOGRAM AND URETERAL STENT INSERTION;  Surgeon: Toni Nguyen MD;  Location: Health system OR;  Service: Urology;  Laterality: Bilateral;    EYE SURGERY Left     RETINA SURGERY OD    ENUCLEATION  OS    FRACTURE SURGERY Left     Hip, hardware present    FRACTURE SURGERY Left     Ankle    HYSTERECTOMY      INSERTION OF TUNNELED CENTRAL VENOUS CATHETER (CVC) WITH SUBCUTANEOUS PORT N/A 2020    Procedure: INSERTION, PORT-A-CATH;  Surgeon: Ranjit Ott III, MD;  Location: Health system OR;  Service: General;  Laterality: N/A;  PRE-OP BY RN 2020---COVID NEGATIVE ON     KNEE SURGERY Left     LAPAROSCOPIC RIGHT COLON RESECTION Right 2020    Procedure: COLECTOMY, RIGHT, LAPAROSCOPIC;  Surgeon: Ranjit Ott III, MD;  Location: Health system OR;  Service: General;  Laterality: Right;  COMBINED SURGERY WITH DR. NGUYEN  COVID NEGATIVE 20    ROTATOR CUFF REPAIR      L arm    TONSILLECTOMY         Review of patient's allergies indicates:   Allergen Reactions    Lyrica [pregabalin]        No current facility-administered medications on file prior to encounter.      Current Outpatient Medications on File Prior to Encounter   Medication Sig    alendronate (FOSAMAX) 70 MG tablet Take 70 mg by mouth every 7 days.    atorvastatin (LIPITOR) 10 MG tablet Take 20 mg by mouth once daily.     donepezil (ARICEPT) 10 MG tablet Take 10 mg by mouth every evening.    ergocalciferol (ERGOCALCIFEROL) 50,000 unit Cap Take 50,000 Units by mouth every 7 days.    ferrous sulfate 325 (65 FE) MG EC tablet Take 1 tablet (325 mg total) by mouth once daily.    gabapentin (NEURONTIN) 100 MG capsule Take 300 mg by mouth 3 (three) times daily.     glycopyrrolate (ROBINUL) 1 mg Tab Take 1 mg by mouth 2 (two) times daily.    HYDROcodone-acetaminophen (NORCO) 7.5-325 mg per tablet Take 1 tablet by mouth 3 (three) times daily as needed.    hydrOXYzine pamoate (VISTARIL) 50 MG Cap Take 25 mg by mouth every evening.    lisinopril (PRINIVIL,ZESTRIL) 40 MG tablet Take 40 mg by mouth once daily.    memantine (NAMENDA) 5 MG Tab Take 5 mg by mouth once daily.    mirtazapine (REMERON) 30 MG tablet Take 30 mg by mouth every evening.    omeprazole (PRILOSEC) 20 MG capsule Take 20 mg by mouth once daily.    pentoxifylline (TRENTAL) 400 mg TbSR Take 400 mg by mouth 3 (three) times daily with meals.    tizanidine HCl (TIZANIDINE ORAL) Take 10 mg by mouth every 12 (twelve) hours as needed.    calcium carbonate (OS-JOSÉ ANTONIO) 500 mg calcium (1,250 mg) tablet Take 1 tablet by mouth once daily.    DULoxetine (CYMBALTA) 30 MG capsule Take 30 mg by mouth nightly.    escitalopram oxalate (LEXAPRO) 10 MG tablet Take 10 mg by mouth once daily.    famotidine (PEPCID) 20 MG tablet Take 1 tablet (20 mg total) by mouth 2 (two) times daily.    HYDROcodone-acetaminophen (NORCO)  mg per tablet Take 1 tablet by mouth every 4 (four) hours as needed for Pain. (Patient not taking: Reported on 10/4/2022)    ibuprofen (ADVIL,MOTRIN) 800 MG tablet Take 800 mg by mouth 2 (two) times daily.    ropinirole (REQUIP) 0.25 MG  tablet Take 0.25 mg by mouth 2 (two) times daily.    vitamin D (VITAMIN D3) 1000 units Tab Take 1,000 Units by mouth once daily.     Family History    None       Tobacco Use    Smoking status: Former     Packs/day: 0.50     Types: Cigarettes     Quit date: 4/6/2019     Years since quitting: 3.4    Smokeless tobacco: Never   Substance and Sexual Activity    Alcohol use: No    Drug use: No    Sexual activity: Not Currently     Review of Systems   Constitutional:  Positive for activity change and fatigue. Negative for fever.   HENT:  Negative for congestion and trouble swallowing.    Eyes:  Positive for visual disturbance.   Respiratory:  Positive for shortness of breath.    Cardiovascular:  Negative for chest pain.   Gastrointestinal:  Negative for diarrhea, nausea and vomiting.   Endocrine: Positive for cold intolerance.   Genitourinary:  Negative for dysuria.   Musculoskeletal:  Positive for back pain.   Skin:  Positive for rash.   Allergic/Immunologic: Negative for immunocompromised state.   Neurological:  Positive for headaches.   Hematological:  Bruises/bleeds easily.   Psychiatric/Behavioral:  Negative for decreased concentration and dysphoric mood.    Objective:     Vital Signs (Most Recent):  Temp: 97.4 °F (36.3 °C) (10/04/22 2138)  Pulse: 64 (10/04/22 2138)  Resp: 18 (10/04/22 2138)  BP: (!) 200/92 (10/04/22 2138)  SpO2: (!) 91 % (10/04/22 2138)   Vital Signs (24h Range):  Temp:  [97.4 °F (36.3 °C)-98.5 °F (36.9 °C)] 97.4 °F (36.3 °C)  Pulse:  [60-82] 64  Resp:  [15-35] 18  SpO2:  [91 %-96 %] 91 %  BP: (129-206)/(77-96) 200/92     Weight: 70.3 kg (155 lb)  Body mass index is 25.02 kg/m².    Physical Exam  Constitutional:       General: She is awake. She is not in acute distress.     Appearance: Normal appearance. She is obese. She is not ill-appearing, toxic-appearing or diaphoretic.   HENT:      Head: Normocephalic and atraumatic.   Eyes:      Comments: Left eye enucleation, blind in right   Neck:       Thyroid: No thyromegaly.   Cardiovascular:      Rate and Rhythm: Normal rate and regular rhythm.      Heart sounds:   No systolic murmur is present.   Pulmonary:      Effort: Pulmonary effort is normal.      Breath sounds: Examination of the right-middle field reveals rales. Examination of the right-lower field reveals rales. Examination of the left-lower field reveals rales. Rales present. No decreased breath sounds, wheezing or rhonchi.   Chest:      Chest wall: No swelling or tenderness.   Abdominal:      General: Abdomen is protuberant. Bowel sounds are normal. There is no distension.      Palpations: Abdomen is soft. There is hepatomegaly.      Tenderness: There is no abdominal tenderness.   Genitourinary:     Comments: No young in place  Musculoskeletal:      Cervical back: Full passive range of motion without pain.   Lymphadenopathy:      Comments: No peripheral edema   Skin:     General: Skin is warm and dry.      Comments: Scaling   Neurological:      General: No focal deficit present.      Mental Status: She is alert and oriented to person, place, and time.      GCS: GCS eye subscore is 4. GCS verbal subscore is 5. GCS motor subscore is 6.      Motor: Tremor present.      Comments: Diffuse tremors   Psychiatric:         Attention and Perception: Attention and perception normal.         Mood and Affect: Mood and affect normal.         Cognition and Memory: Cognition and memory normal.           Significant Labs: All pertinent labs within the past 24 hours have been reviewed.  Recent Results (from the past 24 hour(s))   POCT glucose    Collection Time: 10/04/22  2:10 PM   Result Value Ref Range    POCT Glucose 176 (H) 70 - 110 mg/dL   Brain natriuretic peptide    Collection Time: 10/04/22  2:11 PM   Result Value Ref Range     (H) 0 - 99 pg/mL   CBC auto differential    Collection Time: 10/04/22  2:11 PM   Result Value Ref Range    WBC 8.73 3.90 - 12.70 K/uL    RBC 4.57 4.00 - 5.40 M/uL    Hemoglobin  12.8 12.0 - 16.0 g/dL    Hematocrit 37.7 37.0 - 48.5 %    MCV 83 82 - 98 fL    MCH 28.0 27.0 - 31.0 pg    MCHC 34.0 32.0 - 36.0 g/dL    RDW 15.8 (H) 11.5 - 14.5 %    Platelets 99 (L) 150 - 450 K/uL    MPV 10.0 9.2 - 12.9 fL    Immature Granulocytes 0.8 (H) 0.0 - 0.5 %    Gran # (ANC) 8.0 (H) 1.8 - 7.7 K/uL    Immature Grans (Abs) 0.07 (H) 0.00 - 0.04 K/uL    Lymph # 0.3 (L) 1.0 - 4.8 K/uL    Mono # 0.4 0.3 - 1.0 K/uL    Eos # 0.0 0.0 - 0.5 K/uL    Baso # 0.01 0.00 - 0.20 K/uL    nRBC 0 0 /100 WBC    Gran % 91.5 (H) 38.0 - 73.0 %    Lymph % 3.0 (L) 18.0 - 48.0 %    Mono % 4.5 4.0 - 15.0 %    Eosinophil % 0.1 0.0 - 8.0 %    Basophil % 0.1 0.0 - 1.9 %    Differential Method Automated    Comprehensive metabolic panel    Collection Time: 10/04/22  2:11 PM   Result Value Ref Range    Sodium 145 136 - 145 mmol/L    Potassium 2.3 (LL) 3.5 - 5.1 mmol/L    Chloride 92 (L) 95 - 110 mmol/L    CO2 45 (HH) 23 - 29 mmol/L    Glucose 173 (H) 70 - 110 mg/dL    BUN 30 (H) 8 - 23 mg/dL    Creatinine 0.8 0.5 - 1.4 mg/dL    Calcium 7.6 (L) 8.7 - 10.5 mg/dL    Total Protein 5.3 (L) 6.0 - 8.4 g/dL    Albumin 3.1 (L) 3.5 - 5.2 g/dL    Total Bilirubin 0.8 0.1 - 1.0 mg/dL    Alkaline Phosphatase 123 55 - 135 U/L    AST 44 (H) 10 - 40 U/L    ALT 92 (H) 10 - 44 U/L    Anion Gap 8 8 - 16 mmol/L    eGFR >60 >60 mL/min/1.73 m^2   D dimer, quantitative    Collection Time: 10/04/22  2:11 PM   Result Value Ref Range    D-Dimer 3.25 (H) <0.50 mg/L FEU   Magnesium    Collection Time: 10/04/22  2:11 PM   Result Value Ref Range    Magnesium 1.6 1.6 - 2.6 mg/dL   Troponin I    Collection Time: 10/04/22  2:11 PM   Result Value Ref Range    Troponin I 0.063 (H) 0.000 - 0.026 ng/mL   CPK    Collection Time: 10/04/22  2:11 PM   Result Value Ref Range    CPK 67 20 - 180 U/L   POCT glucose    Collection Time: 10/04/22  2:11 PM   Result Value Ref Range    POC Glucose 176 (A) 70 - 110 MG/DL   POCT COVID-19 Rapid Screening    Collection Time: 10/04/22  2:25 PM    Result Value Ref Range    POC Rapid COVID Negative Negative     Acceptable Yes    Urinalysis, Reflex to Urine Culture Urine, Clean Catch    Collection Time: 10/04/22  2:57 PM    Specimen: Urine   Result Value Ref Range    Specimen UA Urine, Clean Catch     Color, UA Yellow Yellow, Straw, Shantel    Appearance, UA Hazy (A) Clear    pH, UA 7.0 5.0 - 8.0    Specific Gravity, UA >1.030 (A) 1.005 - 1.030    Protein, UA 2+ (A) Negative    Glucose, UA Negative Negative    Ketones, UA Negative Negative    Bilirubin (UA) Negative Negative    Occult Blood UA 1+ (A) Negative    Nitrite, UA Negative Negative    Urobilinogen, UA 4.0-6.0 (A) <2.0 EU/dL    Leukocytes, UA 2+ (A) Negative   Urinalysis Microscopic    Collection Time: 10/04/22  2:57 PM   Result Value Ref Range    RBC, UA 3 0 - 4 /hpf    WBC, UA 12 (H) 0 - 5 /hpf    Bacteria Rare None-Occ /hpf    Squam Epithel, UA 7 /hpf    Hyaline Casts, UA 3 (A) 0-1/lpf /lpf    Microscopic Comment SEE COMMENT    Blood culture #1 **CANNOT BE ORDERED STAT**    Collection Time: 10/04/22  4:05 PM    Specimen: Peripheral, Antecubital, Left; Blood   Result Value Ref Range    Blood Culture, Routine No Growth to date    Procalcitonin    Collection Time: 10/04/22  4:05 PM   Result Value Ref Range    Procalcitonin 3.27 (H) <0.25 ng/mL   Blood culture #2 **CANNOT BE ORDERED STAT**    Collection Time: 10/04/22  4:06 PM    Specimen: Peripheral, Forearm, Right; Blood   Result Value Ref Range    Blood Culture, Routine No Growth to date    ISTAT PROCEDURE    Collection Time: 10/04/22 10:16 PM   Result Value Ref Range    POC PH 7.528 (H) 7.35 - 7.45    POC PCO2 58.6 (HH) 35 - 45 mmHg    POC PO2 81 80 - 100 mmHg    POC HCO3 48.7 (H) 24 - 28 mmol/L    POC BE 22 -2 to 2 mmol/L    POC SATURATED O2 97 95 - 100 %    POC TCO2 >50 (H) 23 - 27 mmol/L    Sample ARTERIAL     Site RR     Allens Test Pass     DelSys Nasal Can          Significant Imaging: I have reviewed all pertinent imaging  results/findings within the past 24 hours.    Assessment/Plan:     * Mass of right lung  -CTA chest obtained today with a 5.3 x 7.6 x 4.4 cm mass in the right middle lobe adjacent to the right lower lobe, possibly a neoplastic process per report.  -Consult Pulmonology, Heme-Onc, and Palliative  -She was given Vanc and Zosyn in the ED.  Will start Levaquin atypical coverage for now.          Postobstructive pneumonia  This is a somewhat equivocal diagnosis  She has no systemic symptoms of infection, no cough, no fever, no leukocytosis  Her procal is up to 3.27, though this can go up in malignancy too    She got Vanco and Zosyn in the ED  Will continue a 5 day course of Levaquin for now      Chronic respiratory failure with hypercapnia  Patient with Hypercapnic Respiratory failure which is Chronic.  she is not on home oxygen.   Supplemental oxygen was provided and noted good O2 sats.  Signs/symptoms of respiratory failure include- increased work of breathing.   Contributing diagnoses includes - COPD, Pneumonia and lung cancer   Labs and images were reviewed. Patient Has recent ABG, which has been reviewed.   Will treat underlying causes and adjust management of respiratory failure as follows- Duonebs    Hypokalemia  In the ED she got K-lyte 40 meq po x 1  K barely came up    Currently ordered     potassium bicarbonate disintegrating tablet 25 mEq, 25 mEq, Oral, Once     potassium chloride 10 mEq in 100 mL IVPB, 10 mEq, Intravenous, Once         Elevated troponin level not due to acute coronary syndrome  I do not suspect ACS  Likely demand from lung mass and +/- PNA  Trop is flat thus far  Check TTE     Latest Reference Range & Units 10/04/22 14:11 10/04/22 22:50   Troponin I 0.000 - 0.026 ng/mL 0.063 (H) 0.066 (H)            Alcoholic cirrhosis of liver without ascites  -Noted on CT  No prior recorded history of cirrhosis, however the patient has a long-standing history of heavy ETOH use in the past.  -AFP, INR  obtained    MELD-Na score: 7 at 10/4/2022 10:50 PM  MELD score: 7 at 10/4/2022 10:50 PM  Calculated from:  Serum Creatinine: 0.8 mg/dL (Using min of 1 mg/dL) at 10/4/2022 10:50 PM  Serum Sodium: 142 mmol/L (Using max of 137 mmol/L) at 10/4/2022 10:50 PM  Total Bilirubin: 0.8 mg/dL (Using min of 1 mg/dL) at 10/4/2022  2:11 PM  INR(ratio): 1.1 at 10/4/2022 10:50 PM  Age: 82 years      Essential hypertension  -BPs elevated in the ED.  Received 1 x dose labetalol 10 mg in the ED.    -Continue home BP meds      Dementia without behavioral disturbance  -Delirium precautions  -Continue home donepezil and memantine    Malignant neoplasm of cecum  Treatment Summary   Plan Name: OP FOLFOX 6 Q2W  Treatment Goal: Curative  Status: Active  Start Date: 2/2/2021  End Date: 7/15/2021 (Planned)  Provider: Khushbu Walden MD  Chemotherapy: fluorouraciL injection 770 mg, 400 mg/m2 = 770 mg, Intravenous, Clinic/HOD 1 time, 2 of 12 cycles  Administration: 770 mg (2/2/2021)  fluorouracil (ADRUCIL) 2,400 mg/m2 = 4,610 mg in sodium chloride 0.9% 240 mL chemo infusion, 2,400 mg/m2 = 4,610 mg, Intravenous, Over 46 hours, 2 of 12 cycles  Administration: 4,610 mg (2/2/2021)  leucovorin calcium 400 mg/m2 = 770 mg in dextrose 5 % 250 mL infusion, 400 mg/m2 = 770 mg, Intravenous, Clinic/HOD 1 time, 2 of 12 cycles  Administration: 770 mg (2/2/2021)  oxaliplatin (ELOXATIN) 85 mg/m2 = 163 mg in dextrose 5 % 500 mL chemo infusion, 85 mg/m2 = 163 mg, Intravenous, Clinic/HOD 1 time, 2 of 12 cycles  Administration: 163 mg (2/2/2021)        VTE Risk Mitigation (From admission, onward)         Ordered     heparin (porcine) injection 5,000 Units  Every 8 hours         10/04/22 2156     Place ZAIRNA hose  Until discontinued         10/04/22 2156     IP VTE HIGH RISK PATIENT  Once         10/04/22 2156     Place sequential compression device  Until discontinued         10/04/22 2156                   RAYSA May MD  Department of Hospital Medicine    [facility] [place]

## 2022-10-05 NOTE — ASSESSMENT & PLAN NOTE
Patient with Hypercapnic Respiratory failure which is Chronic.  she is not on home oxygen.   Supplemental oxygen was provided and noted good O2 sats.  Signs/symptoms of respiratory failure include- increased work of breathing.   Contributing diagnoses includes - COPD, Pneumonia and lung cancer   Labs and images were reviewed. Patient Has recent ABG, which has been reviewed.   Will treat underlying causes and adjust management of respiratory failure as follows- Roxi

## 2022-10-05 NOTE — ASSESSMENT & PLAN NOTE
In the ED she got K-lyte 40 meq po x 1  K barely came up    Currently ordered     potassium bicarbonate disintegrating tablet 25 mEq, 25 mEq, Oral, Once     potassium chloride 10 mEq in 100 mL IVPB, 10 mEq, Intravenous, Once

## 2022-10-05 NOTE — CONSULTS
[facility] - Telemetry  Palliative Medicine  Consult Note    Patient Name: Annette Fallon  MRN: [id number]  Admission Date: 10/4/2022  Hospital Length of Stay: 1 days  Code Status: DNR   Attending Provider: Oscar Doan III, MD  Consulting Provider: Gisel Layton NP  Primary Care Physician: Willa Friedman  Principal Problem:Mass of right lung    Patient information was obtained from patient, spouse/SO, past medical records, ER records and primary team.      Inpatient consult to Palliative Care  Consult performed by: Gisel Layton NP  Consult ordered by: CATHLEEN May MD  Reason for consult: Goals of care and advanced care planning      Assessment/Plan:   Advance Care Planning   Palliative Consult   Date: 10/05/2022  - consult received   - interval chart reviewed in detail   - discussed pt with Dr. Doan (primary) and   - met with patient and  (Andrew), at bedside; introduction to palliative medicine team and role in current care and admission   - pt with tremors, 10/10 pain, and tachypnea at time of assessment despite dose of hydrocodone 5 mg at 9 am; reports taking hydrocodone at minimum BID prior to admit which has not been helping with pain; limited ROS due to pain; discussed pain management plan with primary Dr. Doan (see below)   - did briefly discuss previous experience with treatment for previous colon cancer; she underwent surgery which she tolerated well, but discontinued chemotherapy after 1 course due not tolerating well; briefly discussed lung/liver imaging and would allow for PCCM and IR to discuss futher, but pt wishes to proceed with Bx if recommended, as she would like to know diagnosis; will continue GOC/ACP discussion once consulted by PCCM, IR, and Oncology, but pt and  did express would likely not be interested in aggressive treatment   - desire for DNR/DNI confirmed   - emotional support provided   - Allowed time for questions/concerns; all addressed;  "expressed availability of myself/palliative team for additional questions/concerns   - updated primary, PCCM, and neurology (Dr. Ernst); later followed up with Nat RN to assess pt's pain well controlled with current regiment; will continue to assess pain management plan and assess for need for extended release     Mass of right lung  Mass of liver   - will allow for PCCM, IR, and oncology input for further GOC/ACP discussions   - IR consulted per PCCM for possible biopsy for MIV approach to diagnosis and staging of potential lung cancer   - pt with 10/10 pain to lower chest/diaphragm/upper abdomen unrelieved with oxycodone 5mg (home medication that pt normally requires at minimum BID per pt), pt also tachypneic and having tremors; per symptom management conversation with primary, Morphine 2mg IV PRN Q4H ordered for breakthrough pain 2     Chronic respiratory failure with hypercapnia  Postobstructive pneumonia  - O2 via NC  - PCCM consulted/following   - management per primary/PCCM      Alcoholic cirrhosis of liver without ascites  - history noted   - pt now with liver lesions  - management per primary     History of malignant neoplasm of cecum  - treated surgically per ; pt tolerated one course of chemotherapy   - history noted     Dementia without behavioral disturbance  - oriented to person, place, time, situation; however did not recall being informed of lung/liver lesions by Dr. Doan, and later did not recall my discussion per PCCM   - will plan to assess baseline further with  and further assess pt when pain is under better control and able to participate     Thank you for your consult. I will follow-up with patient. Please contact us if you have any additional questions.    Subjective:     HPI:   From H&P: " 83 y/o female with a significant medical history of blindness, anxiety/depression, GERD, osteoporosis, malignant Colon CA, HTN, HLD, iron deficiency anemia, neuralgia presents to the " "hospital for evaluation of back pain, chest pain, SOB, and weakness that began "a few days ago."     The patient resides at [facility] and states she uses a walker at baseline.  She states she has been having severe back pain that has made it increasingly difficult for her to ambulate.  She states when she was using her walker she did not get SOB but she only had to walk a short distance to get to the restroom in her room.  She is not on home oxygen at baseline.  She points to the center of her chest as the area of her pain and describes it "burning".  She denies fever, cough, numbness, N/V/D.       She endorses dysuria, HA, and lightheadedness.  Currently the patient is awake and oriented x 4.  She endorses back pain, 5/10 at this time.  She also has epigastric "burning".  She has movement and sensation in all extremities.  ED workup included an elevated D-dimer (3.25), low K+ (2.3), UA positive for UTI, and an abnormal CTA Chest that revealed a 5.3 x 7.6 x 4.4 cm mass in the right middle and lower lobe along with cirrhotic changes and multiple lesions in the liver and probable chronic compression fractures of T11, 12, and L1.     She was given Dilaudid, Vanc, Zosyn in the ED.  She is satting 95% on 2L NC O2."    Palliative medicine consulted for advance care planning and continuity of care; Met with pt at bedside; for details of visit, see advance care planning section of plan.         Hospital Course:  No notes on file    Past Medical History:   Diagnosis Date    Arthritis     Blind in both eyes     Cancer     Colon cancer     Depression     GERD (gastroesophageal reflux disease)     History of radiofrequency ablation procedure for cardiac arrhythmia     Hypertension     Memory loss     Osteoporosis     Palpitations     Port-A-Cath in place 12/17/2020    Right    Sleep apnea     Stroke-like symptoms 02/24/2021    slurred speech, BLE weakness (Lt greater than Rt)       Past Surgical " History:   Procedure Laterality Date    APPENDECTOMY       SECTION      x2    COLON SURGERY  2020    CYSTOSCOPY WITH URETEROSCOPY, RETROGRADE PYELOGRAPHY, AND INSERTION OF STENT Bilateral 2020    Procedure: CYSTOSCOPY, WITH RETROGRADE PYELOGRAM AND URETERAL STENT INSERTION;  Surgeon: Toni Nguyen MD;  Location: [facility] OR;  Service: Urology;  Laterality: Bilateral;    EYE SURGERY Left     RETINA SURGERY OD    ENUCLEATION  OS    FRACTURE SURGERY Left     Hip, hardware present    FRACTURE SURGERY Left     Ankle    HYSTERECTOMY      INSERTION OF TUNNELED CENTRAL VENOUS CATHETER (CVC) WITH SUBCUTANEOUS PORT N/A 2020    Procedure: INSERTION, PORT-A-CATH;  Surgeon: Ranjit Ott III, MD;  Location: [facility] OR;  Service: General;  Laterality: N/A;  PRE-OP BY RN 2020---COVID NEGATIVE ON     KNEE SURGERY Left     LAPAROSCOPIC RIGHT COLON RESECTION Right 2020    Procedure: COLECTOMY, RIGHT, LAPAROSCOPIC;  Surgeon: Ranjit Ott III, MD;  Location: [facility] OR;  Service: General;  Laterality: Right;  COMBINED SURGERY WITH DR. NGUYEN  COVID NEGATIVE 20    ROTATOR CUFF REPAIR      L arm    TONSILLECTOMY         Review of patient's allergies indicates:   Allergen Reactions    Lyrica [pregabalin]        Medications:  Continuous Infusions:  Scheduled Meds:   albuterol-ipratropium  3 mL Nebulization Q6H    aspirin  81 mg Oral Daily    atorvastatin  20 mg Oral Daily    dextromethorphan-guaiFENesin  mg/5 ml  10 mL Oral Q6H    donepeziL  10 mg Oral QHS    DULoxetine  30 mg Oral Nightly    ergocalciferol  50,000 Units Oral Q7 Days    EScitalopram oxalate  10 mg Oral Daily    ferrous sulfate  1 tablet Oral Daily    gabapentin  300 mg Oral TID    glycopyrrolate  1 mg Oral BID    heparin (porcine)  5,000 Units Subcutaneous Q8H    hydrALAZINE  25 mg Oral Q8H    hydrOXYzine pamoate  25 mg Oral QHS    levoFLOXacin  750 mg Intravenous Q24H    lisinopriL   "40 mg Oral Daily    memantine  5 mg Oral Daily    mirtazapine  30 mg Oral QHS    pantoprazole  40 mg Oral Daily    pentoxifylline  400 mg Oral TID WM    polyethylene glycol  17 g Oral Daily    rOPINIRole  0.25 mg Oral BID    vitamin D  1,000 Units Oral Daily     PRN Meds:acetaminophen, albuterol-ipratropium, aluminum-magnesium hydroxide-simethicone, benzonatate, dextrose 10%, dextrose 10%, glucagon (human recombinant), glucose, glucose, insulin aspart U-100, melatonin, morphine, naloxone, ondansetron, oxyCODONE, prochlorperazine, senna-docusate 8.6-50 mg, simethicone, sodium chloride 0.9%, tiZANidine    Family History    None       Tobacco Use    Smoking status: Former     Packs/day: 0.50     Types: Cigarettes     Quit date: 4/6/2019     Years since quitting: 3.5    Smokeless tobacco: Never   Substance and Sexual Activity    Alcohol use: No    Drug use: No    Sexual activity: Not Currently       Review of Systems   Constitutional:  Positive for activity change and fatigue. Negative for fever.        Limited ROS as pt was in a significant amount of pain at time of assessment; will reassess once pain under better control    Eyes:         Blind    Respiratory:  Positive for shortness of breath. Negative for cough.    Cardiovascular:  Positive for chest pain.        Pain to diaphragm/sternal/upper abdomen area 10/10, described as sharp and aching    Gastrointestinal:  Positive for abdominal pain. Negative for constipation, nausea and vomiting.   Musculoskeletal:  Positive for arthralgias and joint swelling.        10/10 pain "all over" but most significant to lower chest/upper abdomen as above    Skin:         Reports burning of skin "all over"   Neurological:  Positive for tremors and weakness. Negative for dizziness, syncope and headaches.        Body tremors x1 week, denies altered consciousness related, denies being cold    Psychiatric/Behavioral:  The patient is nervous/anxious.    Objective:     Vital " Signs (Most Recent):  Temp: 98.2 °F (36.8 °C) (10/05/22 1201)  Pulse: 72 (10/05/22 1201)  Resp: 18 (10/05/22 1202)  BP: (!) 175/82 (10/05/22 1201)  SpO2: (!) 93 % (10/05/22 1201)   Vital Signs (24h Range):  Temp:  [97.4 °F (36.3 °C)-98.7 °F (37.1 °C)] 98.2 °F (36.8 °C)  Pulse:  [60-82] 72  Resp:  [15-35] 18  SpO2:  [91 %-97 %] 93 %  BP: (129-208)/(77-98) 175/82     Weight: 79.4 kg (175 lb 0.7 oz)  Body mass index is 28.25 kg/m².    Physical Exam  Constitutional:       Appearance: She is ill-appearing.   HENT:      Head: Normocephalic and atraumatic.      Nose: Nose normal.      Mouth/Throat:      Mouth: Mucous membranes are moist.   Pulmonary:      Effort: Tachypnea present.      Comments: O2 via NC in place; labored breathing   Neurological:      Mental Status: She is alert.       Significant Labs: All pertinent labs within the past 24 hours have been reviewed.  CBC:   Recent Labs   Lab 10/05/22  0434   WBC 8.60   HGB 12.8   HCT 40.2   MCV 84   *     BMP:  Recent Labs   Lab 10/05/22  0434   *      K 3.2*   CL 89*   CO2 42*   BUN 25*   CREATININE 0.8   CALCIUM 7.4*   MG 1.7     LFT:  Lab Results   Component Value Date    AST 43 (H) 10/05/2022    ALKPHOS 131 10/05/2022    BILITOT 0.7 10/05/2022     Albumin:   Albumin   Date Value Ref Range Status   10/05/2022 3.2 (L) 3.5 - 5.2 g/dL Final     Protein:   Total Protein   Date Value Ref Range Status   10/05/2022 5.4 (L) 6.0 - 8.4 g/dL Final     Lactic acid:   Lab Results   Component Value Date    LACTATE 0.9 10/31/2020    LACTATE 2.0 01/14/2019     Significant Imaging: I have reviewed all pertinent imaging results/findings within the past 24 hours.      Total visit time: 70 minutes    > 50% of  70  min visit spent in chart review, face to face discussion of symptom assessment, coordination of care with other specialists, and discharge planning.    ACP time spent included in initial visit time: goals of care, emotional support, formulating and  communicating prognosis, exploring burden/ benefit of various approaches of treatment.     Gisel Layton NP  Palliative Medicine  [facility] - [facility]

## 2022-10-05 NOTE — ASSESSMENT & PLAN NOTE
Former smoker.  Congratulated on quitting encouraged ongoing cessation.  Certainly increases likelihood of malignancy per chest CT.

## 2022-10-05 NOTE — CONSULTS
IR consult received to assess for potential image-guided tissue sampling of liver lesion in pt with new imaging revealing suspicious lung mass and liver lesions that may reflect mets. Liver lesions are note well assessed and characterized on current images. Need further characterization to exclude 'no-touch' hepatic lesion prior to tentative bx. CT a/p has been ordered and NPO order placed for tomorrow. If CT reveals lesions are safe to bx, will plan on US-guided core biopsy of liver lesion tomorrow AM.    Please continue to hold all non-essential anti-platelets, anti-coagulants and keep pt NPO after midnight.    Thank you for considering IR for the care of your patient.     Robinson Belcher MD  Interventional Radiology

## 2022-10-05 NOTE — CONSULTS
[facility] - Telemetry  Pulmonology  Consult Note    Patient Name: Annette Fallon  MRN: [id number]  Admission Date: 10/4/2022  Hospital Length of Stay: 1 days  Code Status: DNR  Attending Physician: Oscar Doan III, MD  Primary Care Provider: Willa Friedman   Principal Problem: Mass of right lung    [unfilled]  Subjective:     HPI:  82-year-old blind female former smoker with history of cecal cancer intolerant of treatment, HTN, hyperlipidemia, chronic pain, anxiety/depression presented with few days of weakness.  Comes from [facility].  Uses walker at baseline.  Complains of epigastric/lower chest pain that radiates to her back.  Has had this pain around 3 times in the past which have all been attributed to PNA.  Described as a stabbing burning sensation.  Additionally complaining of tremors all over that get worse with movement  incidentally discovered right lung mass with hepatic lesions.      Past Medical History:   Diagnosis Date    Arthritis     Blind in both eyes     Cancer     Colon cancer     Depression     GERD (gastroesophageal reflux disease)     History of radiofrequency ablation procedure for cardiac arrhythmia     Hypertension     Memory loss     Osteoporosis     Palpitations     Port-A-Cath in place 2020    Right    Sleep apnea     Stroke-like symptoms 2021    slurred speech, BLE weakness (Lt greater than Rt)       Past Surgical History:   Procedure Laterality Date    APPENDECTOMY       SECTION      x2    COLON SURGERY  2020    CYSTOSCOPY WITH URETEROSCOPY, RETROGRADE PYELOGRAPHY, AND INSERTION OF STENT Bilateral 2020    Procedure: CYSTOSCOPY, WITH RETROGRADE PYELOGRAM AND URETERAL STENT INSERTION;  Surgeon: Toni Nguyen MD;  Location: [facility];  Service: Urology;  Laterality: Bilateral;    EYE SURGERY Left     RETINA SURGERY OD    ENUCLEATION  OS    FRACTURE SURGERY Left     Hip, hardware present    FRACTURE SURGERY Left      Ankle    HYSTERECTOMY      INSERTION OF TUNNELED CENTRAL VENOUS CATHETER (CVC) WITH SUBCUTANEOUS PORT N/A 12/17/2020    Procedure: INSERTION, PORT-A-CATH;  Surgeon: Ranjit Ott III, MD;  Location: [facility] OR;  Service: General;  Laterality: N/A;  PRE-OP BY RN 12----COVID NEGATIVE ON 12/16    KNEE SURGERY Left     LAPAROSCOPIC RIGHT COLON RESECTION Right 11/12/2020    Procedure: COLECTOMY, RIGHT, LAPAROSCOPIC;  Surgeon: Ranjit Ott III, MD;  Location: [facility] OR;  Service: General;  Laterality: Right;  COMBINED SURGERY WITH DR. VALERA  COVID NEGATIVE 11/9/20    ROTATOR CUFF REPAIR      L arm    TONSILLECTOMY         Review of patient's allergies indicates:   Allergen Reactions    Lyrica [pregabalin]        Family History    None       Tobacco Use    Smoking status: Former     Packs/day: 0.50     Types: Cigarettes     Quit date: 4/6/2019     Years since quitting: 3.5    Smokeless tobacco: Never   Substance and Sexual Activity    Alcohol use: No    Drug use: No    Sexual activity: Not Currently         Review of Systems   Constitutional:  Positive for activity change and fatigue. Negative for fever.   Eyes:  Positive for visual disturbance.   Respiratory:  Positive for shortness of breath. Negative for cough.    Cardiovascular:  Positive for chest pain. Negative for leg swelling.   Gastrointestinal:  Negative for abdominal pain, diarrhea, nausea and vomiting.   Neurological:  Positive for light-headedness and headaches.     Objective:     Vital Signs (Most Recent):  Temp: 98.2 °F (36.8 °C) (10/05/22 1201)  Pulse: 72 (10/05/22 1201)  Resp: 18 (10/05/22 1202)  BP: (!) 175/82 (10/05/22 1201)  SpO2: (!) 93 % (10/05/22 1201)   Vital Signs (24h Range):  Temp:  [97.4 °F (36.3 °C)-98.7 °F (37.1 °C)] 98.2 °F (36.8 °C)  Pulse:  [60-82] 72  Resp:  [15-35] 18  SpO2:  [91 %-97 %] 93 %  BP: (129-208)/(77-98) 175/82     Weight: 79.4 kg (175 lb 0.7 oz)  Body mass index is 28.25 kg/m².    No intake or  output data in the 24 hours ending 10/05/22 1253    Physical Exam  Vitals and nursing note reviewed.   Constitutional:       General: She is not in acute distress.     Appearance: She is ill-appearing (chronically). She is not toxic-appearing or diaphoretic.   HENT:      Head: Normocephalic and atraumatic.   Eyes:      Comments: Blind   Cardiovascular:      Rate and Rhythm: Normal rate and regular rhythm.   Pulmonary:      Effort: No tachypnea, accessory muscle usage, respiratory distress or retractions.   Abdominal:      General: There is no distension.      Palpations: Abdomen is soft.   Musculoskeletal:      Right lower leg: No edema.      Left lower leg: No edema.   Skin:     General: Skin is warm and dry.      Coloration: Skin is not jaundiced.      Findings: No rash.   Neurological:      General: No focal deficit present.      Mental Status: Mental status is at baseline.       Vents:       Lines/Drains/Airways       Peripheral Intravenous Line  Duration                  Peripheral IV - Single Lumen 10/04/22 1305 20 G Left Antecubital <1 day         Peripheral IV - Single Lumen 10/04/22 1606 20 G Anterior;Right Forearm <1 day                    Significant Labs:    CBC/Anemia Profile:  Recent Labs   Lab 10/04/22  1411 10/05/22  0434   WBC 8.73 8.60   HGB 12.8 12.8   HCT 37.7 40.2   PLT 99* 105*   MCV 83 84   RDW 15.8* 15.7*        Chemistries:  Recent Labs   Lab 10/04/22  1411 10/04/22  2250 10/05/22  0434    142 141   K 2.3* 2.8* 3.2*   CL 92* 89* 89*   CO2 45* 42* 42*   BUN 30* 25* 25*   CREATININE 0.8 0.8 0.8   CALCIUM 7.6* 7.2* 7.4*   ALBUMIN 3.1*  --  3.2*   PROT 5.3*  --  5.4*   BILITOT 0.8  --  0.7   ALKPHOS 123  --  131   ALT 92*  --  88*   AST 44*  --  43*   MG 1.6 1.7 1.7   PHOS  --   --  3.1       All pertinent labs within the past 24 hours have been reviewed.    Significant Imaging:   I have reviewed all pertinent imaging results/findings within the past 24 hours.      ABG  Recent Labs   Lab  10/04/22  2216   PH 7.528*   PO2 81   PCO2 58.6*   HCO3 48.7*   BE 22     Assessment/Plan:     * Mass of right lung  Former smoker.  History of cecal cancer.  Large right-sided central mass at 7 cm in its greatest dimension with associated mediastinal lymphadenopathy and several hypodense lesions in the liver.  Also noted is moderate right effusion.  Concerning for malignancy.  Appears radiographically stage IV or stage IIIB not including the liver.  Poor sensitivity in detecting malignant cells in pleural fluid for initial thoracentesis.  Recommend consulting IR for biopsy of liver lesion.  Patient can be diagnosed and staged all the same time.  Appears to have some chronic illnesses with a poor baseline functional status.  Unclear as to whether she would be a good candidate for aggressive chemo/radiation.  Palliative Care is following.    Malignant neoplasm of cecum  Reportedly did not tolerate treatment in the past.  Certainly increases the risk the thoracic finding is also malignancy.    Dementia without behavioral disturbance  Appears pretty close to baseline.  Response is appropriate to questioning.  Thought process is linear.    Tobacco abuse  Former smoker.  Congratulated on quitting encouraged ongoing cessation.  Certainly increases likelihood of malignancy per chest CT.          Thank you for your consult. I will follow-up with patient. Please contact us if you have any additional questions.     Tigre Gold MD  Pulmonology  [facility]

## 2022-10-05 NOTE — ASSESSMENT & PLAN NOTE
-CTA chest obtained today with a 5.3 x 7.6 x 4.4 cm mass in the right middle lobe adjacent to the right lower lobe, possibly a neoplastic process per report.  -Consult Pulmonology, Heme-Onc, and Palliative  -She was given Vanc and Zosyn in the ED.  Will start Levaquin atypical coverage for now.

## 2022-10-05 NOTE — NURSING
Patient arrived to unit via stretcher with transport.  Continuous cardiac monitoring initiated.  AAOX4.  Blind in both eyes.  Respirations even and unlabored. O2 infusing at 2L NC.  No complaints of pain at this time. Skin clean dry and intact.  Preventive dressing placed on sacrum.  Safety precautions initiated.  Bed in lowest position and locked.  Call light in reach. Side rails up X2.  Non skid foot wear maintained.  Oriented to unit and room.  Will continue to monitor.

## 2022-10-05 NOTE — ASSESSMENT & PLAN NOTE
-Noted on CT  No prior recorded history of cirrhosis, however the patient has a long-standing history of heavy ETOH use in the past.  -AFP, INR obtained    MELD-Na score: 7 at 10/4/2022 10:50 PM  MELD score: 7 at 10/4/2022 10:50 PM  Calculated from:  Serum Creatinine: 0.8 mg/dL (Using min of 1 mg/dL) at 10/4/2022 10:50 PM  Serum Sodium: 142 mmol/L (Using max of 137 mmol/L) at 10/4/2022 10:50 PM  Total Bilirubin: 0.8 mg/dL (Using min of 1 mg/dL) at 10/4/2022  2:11 PM  INR(ratio): 1.1 at 10/4/2022 10:50 PM  Age: 82 years

## 2022-10-05 NOTE — ASSESSMENT & PLAN NOTE
Reportedly did not tolerate treatment in the past.  Certainly increases the risk the thoracic finding is also malignancy.

## 2022-10-05 NOTE — ASSESSMENT & PLAN NOTE
-BPs elevated in the ED.  Received 1 x dose labetalol 10 mg in the ED.    -Continue home BP meds

## 2022-10-05 NOTE — SUBJECTIVE & OBJECTIVE
Past Medical History:   Diagnosis Date    Arthritis     Blind in both eyes     Cancer     Colon cancer     Depression     GERD (gastroesophageal reflux disease)     History of radiofrequency ablation procedure for cardiac arrhythmia     Hypertension     Memory loss     Osteoporosis     Palpitations     Port-A-Cath in place 2020    Right    Sleep apnea     Stroke-like symptoms 2021    slurred speech, BLE weakness (Lt greater than Rt)       Past Surgical History:   Procedure Laterality Date    APPENDECTOMY       SECTION      x2    COLON SURGERY  2020    CYSTOSCOPY WITH URETEROSCOPY, RETROGRADE PYELOGRAPHY, AND INSERTION OF STENT Bilateral 2020    Procedure: CYSTOSCOPY, WITH RETROGRADE PYELOGRAM AND URETERAL STENT INSERTION;  Surgeon: oTni Nguyen MD;  Location: [facility] OR;  Service: Urology;  Laterality: Bilateral;    EYE SURGERY Left     RETINA SURGERY OD    ENUCLEATION  OS    FRACTURE SURGERY Left     Hip, hardware present    FRACTURE SURGERY Left     Ankle    HYSTERECTOMY      INSERTION OF TUNNELED CENTRAL VENOUS CATHETER (CVC) WITH SUBCUTANEOUS PORT N/A 2020    Procedure: INSERTION, PORT-A-CATH;  Surgeon: Ranjit Ott III, MD;  Location: [facility] OR;  Service: General;  Laterality: N/A;  PRE-OP BY RN 2020---COVID NEGATIVE ON     KNEE SURGERY Left     LAPAROSCOPIC RIGHT COLON RESECTION Right 2020    Procedure: COLECTOMY, RIGHT, LAPAROSCOPIC;  Surgeon: Ranjit Ott III, MD;  Location: [facility] OR;  Service: General;  Laterality: Right;  COMBINED SURGERY WITH DR. NGUYEN  COVID NEGATIVE 20    ROTATOR CUFF REPAIR      L arm    TONSILLECTOMY         Review of patient's allergies indicates:   Allergen Reactions    Lyrica [pregabalin]        No current facility-administered medications on file prior to encounter.     Current Outpatient Medications on File Prior to Encounter   Medication Sig    alendronate (FOSAMAX) 70 MG tablet Take 70 mg by mouth every 7  days.    atorvastatin (LIPITOR) 10 MG tablet Take 20 mg by mouth once daily.     donepezil (ARICEPT) 10 MG tablet Take 10 mg by mouth every evening.    ergocalciferol (ERGOCALCIFEROL) 50,000 unit Cap Take 50,000 Units by mouth every 7 days.    ferrous sulfate 325 (65 FE) MG EC tablet Take 1 tablet (325 mg total) by mouth once daily.    gabapentin (NEURONTIN) 100 MG capsule Take 300 mg by mouth 3 (three) times daily.     glycopyrrolate (ROBINUL) 1 mg Tab Take 1 mg by mouth 2 (two) times daily.    HYDROcodone-acetaminophen (NORCO) 7.5-325 mg per tablet Take 1 tablet by mouth 3 (three) times daily as needed.    hydrOXYzine pamoate (VISTARIL) 50 MG Cap Take 25 mg by mouth every evening.    lisinopril (PRINIVIL,ZESTRIL) 40 MG tablet Take 40 mg by mouth once daily.    memantine (NAMENDA) 5 MG Tab Take 5 mg by mouth once daily.    mirtazapine (REMERON) 30 MG tablet Take 30 mg by mouth every evening.    omeprazole (PRILOSEC) 20 MG capsule Take 20 mg by mouth once daily.    pentoxifylline (TRENTAL) 400 mg TbSR Take 400 mg by mouth 3 (three) times daily with meals.    tizanidine HCl (TIZANIDINE ORAL) Take 10 mg by mouth every 12 (twelve) hours as needed.    calcium carbonate (OS-JOSÉ ANTONIO) 500 mg calcium (1,250 mg) tablet Take 1 tablet by mouth once daily.    DULoxetine (CYMBALTA) 30 MG capsule Take 30 mg by mouth nightly.    escitalopram oxalate (LEXAPRO) 10 MG tablet Take 10 mg by mouth once daily.    famotidine (PEPCID) 20 MG tablet Take 1 tablet (20 mg total) by mouth 2 (two) times daily.    HYDROcodone-acetaminophen (NORCO)  mg per tablet Take 1 tablet by mouth every 4 (four) hours as needed for Pain. (Patient not taking: Reported on 10/4/2022)    ibuprofen (ADVIL,MOTRIN) 800 MG tablet Take 800 mg by mouth 2 (two) times daily.    ropinirole (REQUIP) 0.25 MG tablet Take 0.25 mg by mouth 2 (two) times daily.    vitamin D (VITAMIN D3) 1000 units Tab Take 1,000 Units by mouth once daily.     Family History    None        Tobacco Use    Smoking status: Former     Packs/day: 0.50     Types: Cigarettes     Quit date: 4/6/2019     Years since quitting: 3.4    Smokeless tobacco: Never   Substance and Sexual Activity    Alcohol use: No    Drug use: No    Sexual activity: Not Currently     Review of Systems   Constitutional:  Positive for activity change and fatigue. Negative for fever.   HENT:  Negative for congestion and trouble swallowing.    Eyes:  Positive for visual disturbance.   Respiratory:  Positive for shortness of breath.    Cardiovascular:  Negative for chest pain.   Gastrointestinal:  Negative for diarrhea, nausea and vomiting.   Endocrine: Positive for cold intolerance.   Genitourinary:  Negative for dysuria.   Musculoskeletal:  Positive for back pain.   Skin:  Positive for rash.   Allergic/Immunologic: Negative for immunocompromised state.   Neurological:  Positive for headaches.   Hematological:  Bruises/bleeds easily.   Psychiatric/Behavioral:  Negative for decreased concentration and dysphoric mood.    Objective:     Vital Signs (Most Recent):  Temp: 97.4 °F (36.3 °C) (10/04/22 2138)  Pulse: 64 (10/04/22 2138)  Resp: 18 (10/04/22 2138)  BP: (!) 200/92 (10/04/22 2138)  SpO2: (!) 91 % (10/04/22 2138)   Vital Signs (24h Range):  Temp:  [97.4 °F (36.3 °C)-98.5 °F (36.9 °C)] 97.4 °F (36.3 °C)  Pulse:  [60-82] 64  Resp:  [15-35] 18  SpO2:  [91 %-96 %] 91 %  BP: (129-206)/(77-96) 200/92     Weight: 70.3 kg (155 lb)  Body mass index is 25.02 kg/m².    Physical Exam  Constitutional:       General: She is awake. She is not in acute distress.     Appearance: Normal appearance. She is obese. She is not ill-appearing, toxic-appearing or diaphoretic.   HENT:      Head: Normocephalic and atraumatic.   Eyes:      Comments: Left eye enucleation, blind in right   Neck:      Thyroid: No thyromegaly.   Cardiovascular:      Rate and Rhythm: Normal rate and regular rhythm.      Heart sounds:   No systolic murmur is present.   Pulmonary:       Effort: Pulmonary effort is normal.      Breath sounds: Examination of the right-middle field reveals rales. Examination of the right-lower field reveals rales. Examination of the left-lower field reveals rales. Rales present. No decreased breath sounds, wheezing or rhonchi.   Chest:      Chest wall: No swelling or tenderness.   Abdominal:      General: Abdomen is protuberant. Bowel sounds are normal. There is no distension.      Palpations: Abdomen is soft. There is hepatomegaly.      Tenderness: There is no abdominal tenderness.   Genitourinary:     Comments: No young in place  Musculoskeletal:      Cervical back: Full passive range of motion without pain.   Lymphadenopathy:      Comments: No peripheral edema   Skin:     General: Skin is warm and dry.      Comments: Scaling   Neurological:      General: No focal deficit present.      Mental Status: She is alert and oriented to person, place, and time.      GCS: GCS eye subscore is 4. GCS verbal subscore is 5. GCS motor subscore is 6.      Motor: Tremor present.      Comments: Diffuse tremors   Psychiatric:         Attention and Perception: Attention and perception normal.         Mood and Affect: Mood and affect normal.         Cognition and Memory: Cognition and memory normal.           Significant Labs: All pertinent labs within the past 24 hours have been reviewed.  Recent Results (from the past 24 hour(s))   POCT glucose    Collection Time: 10/04/22  2:10 PM   Result Value Ref Range    POCT Glucose 176 (H) 70 - 110 mg/dL   Brain natriuretic peptide    Collection Time: 10/04/22  2:11 PM   Result Value Ref Range     (H) 0 - 99 pg/mL   CBC auto differential    Collection Time: 10/04/22  2:11 PM   Result Value Ref Range    WBC 8.73 3.90 - 12.70 K/uL    RBC 4.57 4.00 - 5.40 M/uL    Hemoglobin 12.8 12.0 - 16.0 g/dL    Hematocrit 37.7 37.0 - 48.5 %    MCV 83 82 - 98 fL    MCH 28.0 27.0 - 31.0 pg    MCHC 34.0 32.0 - 36.0 g/dL    RDW 15.8 (H) 11.5 - 14.5 %     Platelets 99 (L) 150 - 450 K/uL    MPV 10.0 9.2 - 12.9 fL    Immature Granulocytes 0.8 (H) 0.0 - 0.5 %    Gran # (ANC) 8.0 (H) 1.8 - 7.7 K/uL    Immature Grans (Abs) 0.07 (H) 0.00 - 0.04 K/uL    Lymph # 0.3 (L) 1.0 - 4.8 K/uL    Mono # 0.4 0.3 - 1.0 K/uL    Eos # 0.0 0.0 - 0.5 K/uL    Baso # 0.01 0.00 - 0.20 K/uL    nRBC 0 0 /100 WBC    Gran % 91.5 (H) 38.0 - 73.0 %    Lymph % 3.0 (L) 18.0 - 48.0 %    Mono % 4.5 4.0 - 15.0 %    Eosinophil % 0.1 0.0 - 8.0 %    Basophil % 0.1 0.0 - 1.9 %    Differential Method Automated    Comprehensive metabolic panel    Collection Time: 10/04/22  2:11 PM   Result Value Ref Range    Sodium 145 136 - 145 mmol/L    Potassium 2.3 (LL) 3.5 - 5.1 mmol/L    Chloride 92 (L) 95 - 110 mmol/L    CO2 45 (HH) 23 - 29 mmol/L    Glucose 173 (H) 70 - 110 mg/dL    BUN 30 (H) 8 - 23 mg/dL    Creatinine 0.8 0.5 - 1.4 mg/dL    Calcium 7.6 (L) 8.7 - 10.5 mg/dL    Total Protein 5.3 (L) 6.0 - 8.4 g/dL    Albumin 3.1 (L) 3.5 - 5.2 g/dL    Total Bilirubin 0.8 0.1 - 1.0 mg/dL    Alkaline Phosphatase 123 55 - 135 U/L    AST 44 (H) 10 - 40 U/L    ALT 92 (H) 10 - 44 U/L    Anion Gap 8 8 - 16 mmol/L    eGFR >60 >60 mL/min/1.73 m^2   D dimer, quantitative    Collection Time: 10/04/22  2:11 PM   Result Value Ref Range    D-Dimer 3.25 (H) <0.50 mg/L FEU   Magnesium    Collection Time: 10/04/22  2:11 PM   Result Value Ref Range    Magnesium 1.6 1.6 - 2.6 mg/dL   Troponin I    Collection Time: 10/04/22  2:11 PM   Result Value Ref Range    Troponin I 0.063 (H) 0.000 - 0.026 ng/mL   CPK    Collection Time: 10/04/22  2:11 PM   Result Value Ref Range    CPK 67 20 - 180 U/L   POCT glucose    Collection Time: 10/04/22  2:11 PM   Result Value Ref Range    POC Glucose 176 (A) 70 - 110 MG/DL   POCT COVID-19 Rapid Screening    Collection Time: 10/04/22  2:25 PM   Result Value Ref Range    POC Rapid COVID Negative Negative     Acceptable Yes    Urinalysis, Reflex to Urine Culture Urine, Clean Catch     Collection Time: 10/04/22  2:57 PM    Specimen: Urine   Result Value Ref Range    Specimen UA Urine, Clean Catch     Color, UA Yellow Yellow, Straw, Shantel    Appearance, UA Hazy (A) Clear    pH, UA 7.0 5.0 - 8.0    Specific Gravity, UA >1.030 (A) 1.005 - 1.030    Protein, UA 2+ (A) Negative    Glucose, UA Negative Negative    Ketones, UA Negative Negative    Bilirubin (UA) Negative Negative    Occult Blood UA 1+ (A) Negative    Nitrite, UA Negative Negative    Urobilinogen, UA 4.0-6.0 (A) <2.0 EU/dL    Leukocytes, UA 2+ (A) Negative   Urinalysis Microscopic    Collection Time: 10/04/22  2:57 PM   Result Value Ref Range    RBC, UA 3 0 - 4 /hpf    WBC, UA 12 (H) 0 - 5 /hpf    Bacteria Rare None-Occ /hpf    Squam Epithel, UA 7 /hpf    Hyaline Casts, UA 3 (A) 0-1/lpf /lpf    Microscopic Comment SEE COMMENT    Blood culture #1 **CANNOT BE ORDERED STAT**    Collection Time: 10/04/22  4:05 PM    Specimen: Peripheral, Antecubital, Left; Blood   Result Value Ref Range    Blood Culture, Routine No Growth to date    Procalcitonin    Collection Time: 10/04/22  4:05 PM   Result Value Ref Range    Procalcitonin 3.27 (H) <0.25 ng/mL   Blood culture #2 **CANNOT BE ORDERED STAT**    Collection Time: 10/04/22  4:06 PM    Specimen: Peripheral, Forearm, Right; Blood   Result Value Ref Range    Blood Culture, Routine No Growth to date    ISTAT PROCEDURE    Collection Time: 10/04/22 10:16 PM   Result Value Ref Range    POC PH 7.528 (H) 7.35 - 7.45    POC PCO2 58.6 (HH) 35 - 45 mmHg    POC PO2 81 80 - 100 mmHg    POC HCO3 48.7 (H) 24 - 28 mmol/L    POC BE 22 -2 to 2 mmol/L    POC SATURATED O2 97 95 - 100 %    POC TCO2 >50 (H) 23 - 27 mmol/L    Sample ARTERIAL     Site RR     Allens Test Pass     DelSys Nasal Can          Significant Imaging: I have reviewed all pertinent imaging results/findings within the past 24 hours.

## 2022-10-05 NOTE — HPI
"81 y/o female with a significant medical history of blindness, anxiety/depression, GERD, osteoporosis, malignant Colon CA, HTN, HLD, iron deficiency anemia, neuralgia presents to the hospital for evaluation of back pain, chest pain, SOB, and weakness that began "a few days ago."    The patient resides at [facility] and states she uses a walker at baseline.  She states she has been having severe back pain that has made it increasingly difficult for her to ambulate.  She states when she was using her walker she did not get SOB but she only had to walk a short distance to get to the restroom in her room.  She is not on home oxygen at baseline.  She points to the center of her chest as the area of her pain and describes it "burning".  She denies fever, cough, numbness, N/V/D.      She endorses dysuria, HA, and lightheadedness.  Currently the patient is awake and oriented x 4.  She endorses back pain, 5/10 at this time.  She also has epigastric "burning".  She has movement and sensation in all extremities.  ED workup included an elevated D-dimer (3.25), low K+ (2.3), UA positive for UTI, and an abnormal CTA Chest that revealed a 5.3 x 7.6 x 4.4 cm mass in the right middle and lower lobe along with cirrhotic changes and multiple lesions in the liver and probable chronic compression fractures of T11, 12, and L1.    She was given Dilaudid, Vanc, Zosyn in the ED.  She is satting 95% on 2L NC O2.  "

## 2022-10-05 NOTE — SUBJECTIVE & OBJECTIVE
Past Medical History:   Diagnosis Date    Arthritis     Blind in both eyes     Cancer     Colon cancer     Depression     GERD (gastroesophageal reflux disease)     History of radiofrequency ablation procedure for cardiac arrhythmia     Hypertension     Memory loss     Osteoporosis     Palpitations     Port-A-Cath in place 2020    Right    Sleep apnea     Stroke-like symptoms 2021    slurred speech, BLE weakness (Lt greater than Rt)       Past Surgical History:   Procedure Laterality Date    APPENDECTOMY       SECTION      x2    COLON SURGERY  2020    CYSTOSCOPY WITH URETEROSCOPY, RETROGRADE PYELOGRAPHY, AND INSERTION OF STENT Bilateral 2020    Procedure: CYSTOSCOPY, WITH RETROGRADE PYELOGRAM AND URETERAL STENT INSERTION;  Surgeon: Toni Nguyen MD;  Location: [facility] OR;  Service: Urology;  Laterality: Bilateral;    EYE SURGERY Left     RETINA SURGERY OD    ENUCLEATION  OS    FRACTURE SURGERY Left     Hip, hardware present    FRACTURE SURGERY Left     Ankle    HYSTERECTOMY      INSERTION OF TUNNELED CENTRAL VENOUS CATHETER (CVC) WITH SUBCUTANEOUS PORT N/A 2020    Procedure: INSERTION, PORT-A-CATH;  Surgeon: Ranjit Ott III, MD;  Location: [facility] OR;  Service: General;  Laterality: N/A;  PRE-OP BY RN 2020---COVID NEGATIVE ON     KNEE SURGERY Left     LAPAROSCOPIC RIGHT COLON RESECTION Right 2020    Procedure: COLECTOMY, RIGHT, LAPAROSCOPIC;  Surgeon: Ranjit Ott III, MD;  Location: [facility] OR;  Service: General;  Laterality: Right;  COMBINED SURGERY WITH DR. NGUYEN  COVID NEGATIVE 20    ROTATOR CUFF REPAIR      L arm    TONSILLECTOMY         Review of patient's allergies indicates:   Allergen Reactions    Lyrica [pregabalin]        Family History    None       Tobacco Use    Smoking status: Former     Packs/day: 0.50     Types: Cigarettes     Quit date: 2019     Years since quitting: 3.5    Smokeless tobacco: Never   Substance and Sexual  Activity    Alcohol use: No    Drug use: No    Sexual activity: Not Currently         Review of Systems   Constitutional:  Positive for activity change and fatigue. Negative for fever.   Eyes:  Positive for visual disturbance.   Respiratory:  Positive for shortness of breath. Negative for cough.    Cardiovascular:  Positive for chest pain. Negative for leg swelling.   Gastrointestinal:  Negative for abdominal pain, diarrhea, nausea and vomiting.   Neurological:  Positive for light-headedness and headaches.     Objective:     Vital Signs (Most Recent):  Temp: 98.2 °F (36.8 °C) (10/05/22 1201)  Pulse: 72 (10/05/22 1201)  Resp: 18 (10/05/22 1202)  BP: (!) 175/82 (10/05/22 1201)  SpO2: (!) 93 % (10/05/22 1201)   Vital Signs (24h Range):  Temp:  [97.4 °F (36.3 °C)-98.7 °F (37.1 °C)] 98.2 °F (36.8 °C)  Pulse:  [60-82] 72  Resp:  [15-35] 18  SpO2:  [91 %-97 %] 93 %  BP: (129-208)/(77-98) 175/82     Weight: 79.4 kg (175 lb 0.7 oz)  Body mass index is 28.25 kg/m².    No intake or output data in the 24 hours ending 10/05/22 1253    Physical Exam  Vitals and nursing note reviewed.   Constitutional:       General: She is not in acute distress.     Appearance: She is ill-appearing (chronically). She is not toxic-appearing or diaphoretic.   HENT:      Head: Normocephalic and atraumatic.   Eyes:      Comments: Blind   Cardiovascular:      Rate and Rhythm: Normal rate and regular rhythm.   Pulmonary:      Effort: No tachypnea, accessory muscle usage, respiratory distress or retractions.   Abdominal:      General: There is no distension.      Palpations: Abdomen is soft.   Musculoskeletal:      Right lower leg: No edema.      Left lower leg: No edema.   Skin:     General: Skin is warm and dry.      Coloration: Skin is not jaundiced.      Findings: No rash.   Neurological:      General: No focal deficit present.      Mental Status: Mental status is at baseline.       Vents:       Lines/Drains/Airways       Peripheral Intravenous Line   Duration                  Peripheral IV - Single Lumen 10/04/22 1305 20 G Left Antecubital <1 day         Peripheral IV - Single Lumen 10/04/22 1606 20 G Anterior;Right Forearm <1 day                    Significant Labs:    CBC/Anemia Profile:  Recent Labs   Lab 10/04/22  1411 10/05/22  0434   WBC 8.73 8.60   HGB 12.8 12.8   HCT 37.7 40.2   PLT 99* 105*   MCV 83 84   RDW 15.8* 15.7*        Chemistries:  Recent Labs   Lab 10/04/22  1411 10/04/22  2250 10/05/22  0434    142 141   K 2.3* 2.8* 3.2*   CL 92* 89* 89*   CO2 45* 42* 42*   BUN 30* 25* 25*   CREATININE 0.8 0.8 0.8   CALCIUM 7.6* 7.2* 7.4*   ALBUMIN 3.1*  --  3.2*   PROT 5.3*  --  5.4*   BILITOT 0.8  --  0.7   ALKPHOS 123  --  131   ALT 92*  --  88*   AST 44*  --  43*   MG 1.6 1.7 1.7   PHOS  --   --  3.1       All pertinent labs within the past 24 hours have been reviewed.    Significant Imaging:   I have reviewed all pertinent imaging results/findings within the past 24 hours.

## 2022-10-05 NOTE — ASSESSMENT & PLAN NOTE
Former smoker.  History of cecal cancer.  Large right-sided central mass at 7 cm in its greatest dimension with associated mediastinal lymphadenopathy and several hypodense lesions in the liver.  Also noted is moderate right effusion.  Concerning for malignancy.  Appears radiographically stage IV or stage IIIB not including the liver.  Poor sensitivity in detecting malignant cells in pleural fluid for initial thoracentesis.  Recommend consulting IR for biopsy of liver lesion.  Patient can be diagnosed and staged all the same time.  Appears to have some chronic illnesses with a poor baseline functional status.  Unclear as to whether she would be a good candidate for aggressive chemo/radiation.  Palliative Care is following.

## 2022-10-05 NOTE — HPI
"From H&P: " 83 y/o female with a significant medical history of blindness, anxiety/depression, GERD, osteoporosis, malignant Colon CA, HTN, HLD, iron deficiency anemia, neuralgia presents to the hospital for evaluation of back pain, chest pain, SOB, and weakness that began "a few days ago."     The patient resides at [facility] and states she uses a walker at baseline.  She states she has been having severe back pain that has made it increasingly difficult for her to ambulate.  She states when she was using her walker she did not get SOB but she only had to walk a short distance to get to the restroom in her room.  She is not on home oxygen at baseline.  She points to the center of her chest as the area of her pain and describes it "burning".  She denies fever, cough, numbness, N/V/D.       She endorses dysuria, HA, and lightheadedness.  Currently the patient is awake and oriented x 4.  She endorses back pain, 5/10 at this time.  She also has epigastric "burning".  She has movement and sensation in all extremities.  ED workup included an elevated D-dimer (3.25), low K+ (2.3), UA positive for UTI, and an abnormal CTA Chest that revealed a 5.3 x 7.6 x 4.4 cm mass in the right middle and lower lobe along with cirrhotic changes and multiple lesions in the liver and probable chronic compression fractures of T11, 12, and L1.     She was given Dilaudid, Vanc, Zosyn in the ED.  She is satting 95% on 2L NC O2."    Palliative medicine consulted for advance care planning and continuity of care; Met with pt at bedside; for details of visit, see advance care planning section of plan.     "

## 2022-10-05 NOTE — CARE UPDATE
Pt seen at bedside this morning. A&Ox3 mostly complaining of upper extremity shaking, but initially admitted due to sob and post-obstructive pna. Continuing abx for pna. Awaiting onc, pulm, and palliative consultation. Due to shaking will consult neurology to ensure there is no central process. Discussed plan with pt and pts daughter over the phone.

## 2022-10-05 NOTE — ASSESSMENT & PLAN NOTE
Appears pretty close to baseline.  Response is appropriate to questioning.  Thought process is linear.

## 2022-10-05 NOTE — SUBJECTIVE & OBJECTIVE
VIRTUAL TELENOTE    Start time: 2pm  Chief complaint: SOB  The patient location is: [address]  The patient arrived at:  2pm   Present with the patient at the time of the telemed/virtual assessment:alone    End time:  15 min    Total time spent with patient: 15 min    The attending portion of this evaluation, treatment, and documentation was performed per Soha Martins MD via Telemedicine AudioVisual using the secure ERCOM software platform with 2 way audio/video. The provider was located off-site and the patient is located in the hospital. The aforementioned video software was utilized to document the relevant history and physical exam.       Oncology Treatment Plan:   OP FOLFOX 6 Q2W    Medications:  Continuous Infusions:  Scheduled Meds:   albuterol-ipratropium  3 mL Nebulization Q6H    aspirin  81 mg Oral Daily    atorvastatin  20 mg Oral Daily    dextromethorphan-guaiFENesin  mg/5 ml  10 mL Oral Q6H    donepeziL  10 mg Oral QHS    DULoxetine  30 mg Oral Nightly    ergocalciferol  50,000 Units Oral Q7 Days    EScitalopram oxalate  10 mg Oral Daily    ferrous sulfate  1 tablet Oral Daily    gabapentin  300 mg Oral TID    glycopyrrolate  1 mg Oral BID    heparin (porcine)  5,000 Units Subcutaneous Q8H    hydrALAZINE  25 mg Oral Q8H    hydrOXYzine pamoate  25 mg Oral QHS    levoFLOXacin  750 mg Intravenous Q24H    lisinopriL  40 mg Oral Daily    memantine  5 mg Oral Daily    mirtazapine  30 mg Oral QHS    pantoprazole  40 mg Oral Daily    pentoxifylline  400 mg Oral TID WM    polyethylene glycol  17 g Oral Daily    rOPINIRole  0.25 mg Oral BID    vitamin D  1,000 Units Oral Daily     PRN Meds:acetaminophen, albuterol-ipratropium, aluminum-magnesium hydroxide-simethicone, benzonatate, dextrose 10%, dextrose 10%, glucagon (human recombinant), glucose, glucose, insulin aspart U-100, melatonin, morphine, naloxone, ondansetron, oxyCODONE, prochlorperazine, senna-docusate 8.6-50 mg, simethicone, sodium chloride  0.9%, tiZANidine     Review of patient's allergies indicates:   Allergen Reactions    Lyrica [pregabalin]         Past Medical History:   Diagnosis Date    Arthritis     Blind in both eyes     Cancer     Colon cancer     Depression     GERD (gastroesophageal reflux disease)     History of radiofrequency ablation procedure for cardiac arrhythmia     Hypertension     Memory loss     Osteoporosis     Palpitations     Port-A-Cath in place 2020    Right    Sleep apnea     Stroke-like symptoms 2021    slurred speech, BLE weakness (Lt greater than Rt)     Past Surgical History:   Procedure Laterality Date    APPENDECTOMY       SECTION      x2    COLON SURGERY  2020    CYSTOSCOPY WITH URETEROSCOPY, RETROGRADE PYELOGRAPHY, AND INSERTION OF STENT Bilateral 2020    Procedure: CYSTOSCOPY, WITH RETROGRADE PYELOGRAM AND URETERAL STENT INSERTION;  Surgeon: Toni Nguyen MD;  Location: [facility] OR;  Service: Urology;  Laterality: Bilateral;    EYE SURGERY Left     RETINA SURGERY OD    ENUCLEATION  OS    FRACTURE SURGERY Left     Hip, hardware present    FRACTURE SURGERY Left     Ankle    HYSTERECTOMY      INSERTION OF TUNNELED CENTRAL VENOUS CATHETER (CVC) WITH SUBCUTANEOUS PORT N/A 2020    Procedure: INSERTION, PORT-A-CATH;  Surgeon: Ranjit Ott III, MD;  Location: [facility] OR;  Service: General;  Laterality: N/A;  PRE-OP BY RN 2020---COVID NEGATIVE ON     KNEE SURGERY Left     LAPAROSCOPIC RIGHT COLON RESECTION Right 2020    Procedure: COLECTOMY, RIGHT, LAPAROSCOPIC;  Surgeon: Ranjit Ott III, MD;  Location: [facility] OR;  Service: General;  Laterality: Right;  COMBINED SURGERY WITH DR. NGUYEN  COVID NEGATIVE 20    ROTATOR CUFF REPAIR      L arm    TONSILLECTOMY       Family History    None       Tobacco Use    Smoking status: Former     Packs/day: 0.50     Types: Cigarettes     Quit date: 2019     Years since quitting: 3.5    Smokeless tobacco: Never    Substance and Sexual Activity    Alcohol use: No    Drug use: No    Sexual activity: Not Currently       Review of Systems   Constitutional:  Positive for activity change and fatigue. Negative for fever.   Eyes:  Positive for visual disturbance (legally blind).   Respiratory:  Positive for shortness of breath. Negative for cough.    Cardiovascular:  Positive for chest pain. Negative for leg swelling.   Gastrointestinal:  Negative for abdominal pain, diarrhea, nausea and vomiting.   Neurological:  Positive for weakness. Negative for tremors and light-headedness.   Objective:     Vital Signs (Most Recent):  Temp: 98.2 °F (36.8 °C) (10/05/22 1201)  Pulse: 74 (10/05/22 1348)  Resp: 18 (10/05/22 1348)  BP: (!) 175/82 (10/05/22 1201)  SpO2: (!) 94 % (10/05/22 1348)   Vital Signs (24h Range):  Temp:  [97.4 °F (36.3 °C)-98.7 °F (37.1 °C)] 98.2 °F (36.8 °C)  Pulse:  [60-80] 74  Resp:  [15-35] 18  SpO2:  [91 %-97 %] 94 %  BP: (143-208)/(77-98) 175/82     Weight: 79.4 kg (175 lb 0.7 oz)  Body mass index is 28.25 kg/m².  Body surface area is 1.92 meters squared.    No intake or output data in the 24 hours ending 10/05/22 1415    Physical Exam    Significant Labs:   All pertinent labs within the past 24 hours have been reviewed     Diagnostic Results:  I have reviewed and interpreted all pertinent imaging results/findings within the past 24 hours

## 2022-10-05 NOTE — ASSESSMENT & PLAN NOTE
This is a somewhat equivocal diagnosis  She has no systemic symptoms of infection, no cough, no fever, no leukocytosis  Her procal is up to 3.27, though this can go up in malignancy too    She got Vanco and Zosyn in the ED  Will continue a 5 day course of Levaquin for now

## 2022-10-05 NOTE — HPI
82-year-old blind female former smoker with history of cecal cancer intolerant of treatment, HTN, hyperlipidemia, chronic pain, anxiety/depression presented with few days of weakness.  Comes from [facility].  Uses walker at baseline.  Complains of epigastric/lower chest pain that radiates to her back.  Has had this pain around 3 times in the past which have all been attributed to PNA.  Described as a stabbing burning sensation.  Additionally complaining of tremors all over that get worse with movement  incidentally discovered right lung mass with hepatic lesions.

## 2022-10-05 NOTE — SUBJECTIVE & OBJECTIVE
Past Medical History:   Diagnosis Date    Arthritis     Blind in both eyes     Cancer     Colon cancer     Depression     GERD (gastroesophageal reflux disease)     History of radiofrequency ablation procedure for cardiac arrhythmia     Hypertension     Memory loss     Osteoporosis     Palpitations     Port-A-Cath in place 2020    Right    Sleep apnea     Stroke-like symptoms 2021    slurred speech, BLE weakness (Lt greater than Rt)       Past Surgical History:   Procedure Laterality Date    APPENDECTOMY       SECTION      x2    COLON SURGERY  2020    CYSTOSCOPY WITH URETEROSCOPY, RETROGRADE PYELOGRAPHY, AND INSERTION OF STENT Bilateral 2020    Procedure: CYSTOSCOPY, WITH RETROGRADE PYELOGRAM AND URETERAL STENT INSERTION;  Surgeon: Toni Nguyen MD;  Location: [facility] OR;  Service: Urology;  Laterality: Bilateral;    EYE SURGERY Left     RETINA SURGERY OD    ENUCLEATION  OS    FRACTURE SURGERY Left     Hip, hardware present    FRACTURE SURGERY Left     Ankle    HYSTERECTOMY      INSERTION OF TUNNELED CENTRAL VENOUS CATHETER (CVC) WITH SUBCUTANEOUS PORT N/A 2020    Procedure: INSERTION, PORT-A-CATH;  Surgeon: Ranjit Ott III, MD;  Location: [facility] OR;  Service: General;  Laterality: N/A;  PRE-OP BY RN 2020---COVID NEGATIVE ON     KNEE SURGERY Left     LAPAROSCOPIC RIGHT COLON RESECTION Right 2020    Procedure: COLECTOMY, RIGHT, LAPAROSCOPIC;  Surgeon: Ranjit Ott III, MD;  Location: [facility] OR;  Service: General;  Laterality: Right;  COMBINED SURGERY WITH DR. NGUYEN  COVID NEGATIVE 20    ROTATOR CUFF REPAIR      L arm    TONSILLECTOMY         Review of patient's allergies indicates:   Allergen Reactions    Lyrica [pregabalin]        Medications:  Continuous Infusions:  Scheduled Meds:   albuterol-ipratropium  3 mL Nebulization Q6H    aspirin  81 mg Oral Daily    atorvastatin  20 mg Oral Daily    dextromethorphan-guaiFENesin  mg/5 ml  10 mL  Oral Q6H    donepeziL  10 mg Oral QHS    DULoxetine  30 mg Oral Nightly    ergocalciferol  50,000 Units Oral Q7 Days    EScitalopram oxalate  10 mg Oral Daily    ferrous sulfate  1 tablet Oral Daily    gabapentin  300 mg Oral TID    glycopyrrolate  1 mg Oral BID    heparin (porcine)  5,000 Units Subcutaneous Q8H    hydrALAZINE  25 mg Oral Q8H    hydrOXYzine pamoate  25 mg Oral QHS    levoFLOXacin  750 mg Intravenous Q24H    lisinopriL  40 mg Oral Daily    memantine  5 mg Oral Daily    mirtazapine  30 mg Oral QHS    pantoprazole  40 mg Oral Daily    pentoxifylline  400 mg Oral TID WM    polyethylene glycol  17 g Oral Daily    rOPINIRole  0.25 mg Oral BID    vitamin D  1,000 Units Oral Daily     PRN Meds:acetaminophen, albuterol-ipratropium, aluminum-magnesium hydroxide-simethicone, benzonatate, dextrose 10%, dextrose 10%, glucagon (human recombinant), glucose, glucose, insulin aspart U-100, melatonin, morphine, naloxone, ondansetron, oxyCODONE, prochlorperazine, senna-docusate 8.6-50 mg, simethicone, sodium chloride 0.9%, tiZANidine    Family History    None       Tobacco Use    Smoking status: Former     Packs/day: 0.50     Types: Cigarettes     Quit date: 4/6/2019     Years since quitting: 3.5    Smokeless tobacco: Never   Substance and Sexual Activity    Alcohol use: No    Drug use: No    Sexual activity: Not Currently       Review of Systems   Constitutional:  Positive for activity change and fatigue. Negative for fever.        Limited ROS as pt was in a significant amount of pain at time of assessment; will reassess once pain under better control    Eyes:         Blind    Respiratory:  Positive for shortness of breath. Negative for cough.    Cardiovascular:  Positive for chest pain.        Pain to diaphragm/sternal/upper abdomen area 10/10, described as sharp and aching    Gastrointestinal:  Positive for abdominal pain. Negative for constipation, nausea and vomiting.   Musculoskeletal:  Positive for arthralgias  "and joint swelling.        10/10 pain "all over" but most significant to lower chest/upper abdomen as above    Skin:         Reports burning of skin "all over"   Neurological:  Positive for tremors and weakness. Negative for dizziness, syncope and headaches.        Body tremors x1 week, denies altered consciousness related, denies being cold    Psychiatric/Behavioral:  The patient is nervous/anxious.    Objective:     Vital Signs (Most Recent):  Temp: 98.2 °F (36.8 °C) (10/05/22 1201)  Pulse: 72 (10/05/22 1201)  Resp: 18 (10/05/22 1202)  BP: (!) 175/82 (10/05/22 1201)  SpO2: (!) 93 % (10/05/22 1201)   Vital Signs (24h Range):  Temp:  [97.4 °F (36.3 °C)-98.7 °F (37.1 °C)] 98.2 °F (36.8 °C)  Pulse:  [60-82] 72  Resp:  [15-35] 18  SpO2:  [91 %-97 %] 93 %  BP: (129-208)/(77-98) 175/82     Weight: 79.4 kg (175 lb 0.7 oz)  Body mass index is 28.25 kg/m².    Physical Exam  Constitutional:       Appearance: She is ill-appearing.   HENT:      Head: Normocephalic and atraumatic.      Nose: Nose normal.      Mouth/Throat:      Mouth: Mucous membranes are moist.   Pulmonary:      Effort: Tachypnea present.      Comments: O2 via NC in place; labored breathing   Neurological:      Mental Status: She is alert.       Significant Labs: All pertinent labs within the past 24 hours have been reviewed.  CBC:   Recent Labs   Lab 10/05/22  0434   WBC 8.60   HGB 12.8   HCT 40.2   MCV 84   *     BMP:  Recent Labs   Lab 10/05/22  0434   *      K 3.2*   CL 89*   CO2 42*   BUN 25*   CREATININE 0.8   CALCIUM 7.4*   MG 1.7     LFT:  Lab Results   Component Value Date    AST 43 (H) 10/05/2022    ALKPHOS 131 10/05/2022    BILITOT 0.7 10/05/2022     Albumin:   Albumin   Date Value Ref Range Status   10/05/2022 3.2 (L) 3.5 - 5.2 g/dL Final     Protein:   Total Protein   Date Value Ref Range Status   10/05/2022 5.4 (L) 6.0 - 8.4 g/dL Final     Lactic acid:   Lab Results   Component Value Date    LACTATE 0.9 10/31/2020    LACTATE " 2.0 01/14/2019     Significant Imaging: I have reviewed all pertinent imaging results/findings within the past 24 hours.

## 2022-10-06 PROBLEM — Z71.89 ACP (ADVANCE CARE PLANNING): Status: ACTIVE | Noted: 2022-01-01

## 2022-10-06 PROBLEM — G89.3 CANCER-RELATED PAIN: Status: ACTIVE | Noted: 2022-01-01

## 2022-10-06 NOTE — NURSING
Reported off to oncoming nurse, patient resting in bed, aaox2. Patient can make needs known to staff, max assist required for adls and transfers, no acute distress noted, safety precautions maintained.      Chart check completed.

## 2022-10-06 NOTE — SUBJECTIVE & OBJECTIVE
Interval History: Pt no longer having tremors of the upper extremities and states they went away overnight. Pt also states she is feeling better with less pain currently. Discussed with pt plan for possible biopsy of the liver which she understands. Pt denies cp, sob, fever, chills, n/v at this time    Review of Systems  Objective:     Vital Signs (Most Recent):  Temp: 98.2 °F (36.8 °C) (10/06/22 0529)  Pulse: 83 (10/06/22 0755)  Resp: 18 (10/06/22 0946)  BP: (!) 179/84 (10/06/22 0529)  SpO2: (!) 94 % (10/06/22 0755)   Vital Signs (24h Range):  Temp:  [98 °F (36.7 °C)-98.5 °F (36.9 °C)] 98.2 °F (36.8 °C)  Pulse:  [72-87] 83  Resp:  [16-20] 18  SpO2:  [92 %-94 %] 94 %  BP: (143-179)/(74-89) 179/84     Weight: 79.4 kg (175 lb 0.7 oz)  Body mass index is 28.25 kg/m².    Intake/Output Summary (Last 24 hours) at 10/6/2022 1138  Last data filed at 10/5/2022 1905  Gross per 24 hour   Intake 240 ml   Output 450 ml   Net -210 ml      Physical Exam  Vitals reviewed.   HENT:      Head: Normocephalic and atraumatic.      Nose: No congestion or rhinorrhea.      Mouth/Throat:      Mouth: Mucous membranes are moist.      Pharynx: Oropharynx is clear.   Eyes:      General:         Right eye: No discharge.         Left eye: No discharge.   Cardiovascular:      Rate and Rhythm: Normal rate and regular rhythm.   Pulmonary:      Effort: Pulmonary effort is normal. No respiratory distress.   Abdominal:      Palpations: Abdomen is soft.      Tenderness: There is no abdominal tenderness.   Musculoskeletal:         General: No swelling or tenderness.      Cervical back: Neck supple. No rigidity.   Skin:     General: Skin is warm and dry.   Neurological:      Mental Status: She is alert.       Significant Labs: All pertinent labs within the past 24 hours have been reviewed.    Significant Imaging: I have reviewed all pertinent imaging results/findings within the past 24 hours.

## 2022-10-06 NOTE — PROGRESS NOTES
[place] [facility]  Palliative Medicine  Progress Note    Patient Name: Annette Fallon  MRN: [id number]  Admission Date: 10/4/2022  Hospital Length of Stay: 2 days  Code Status: DNR   Attending Provider: Oscar Doan III, MD  Consulting Provider: Gisel Layton NP  Primary Care Physician: Willa Friedman  Principal Problem:Mass of right lung    Patient information was obtained from patient, spouse/SO, past medical records, ER records and primary team.      Assessment/Plan:   Advance Care Planning   Palliative Encounter   Date: 10/06/2022  - interval chart reviewed in detail; discussed pt with Dr. Doan (primary)   - met with pt at bedside; reports better pain control and resolution of tremors following morphine dose yesterday (see below); still continued pain, cancer related pain education provided to pt (see below for pain management plan)   - GOC/ACP discussion; pt confirms understanding of oncology's recommendation that the risks of treatment would not out weight benefit, and she would not be interested in pursuing aggressive treatment following her poor tolerance of chemo in the past; discussed hospice and philosophy of care; pt expressed great interest in hospice at NH with main GOC is to be comfortable and as little pain as possible; pt confirms desire to return to previous NH with hospice services provided there; pt approved of calling her  to provide medical update and discuss hospice   - called pt's , Tim, provided brief medical update, he seems to have good insight into pt's possible diagnosis and pt's poor candidacy for aggressive treatment; also discussed hospice;  agrees to hospice  - both pt and  wish to proceed with biopsy for diagnosis confirmation   - emotional support provided to pt and ; allowed time for questions/concerns, all addressed   - updated primary and CM/SW; CM/SW to coordinate/arrange hospice with pt's existing NH (requested meeting  with  at bedside)     Palliative Consult   Date: 10/05/2022  - consult received   - interval chart reviewed in detail   - discussed pt with Dr. Doan (primary)   - met with patient and  (Andrew), at bedside; introduction to palliative medicine team and role in current care and admission   - pt with tremors, 10/10 pain, and tachypnea at time of assessment despite dose of hydrocodone 5 mg at 9 am; reports taking hydrocodone at minimum BID prior to admit which has not been helping with pain; limited ROS due to pain; discussed pain management plan with primary Dr. Doan (see below)   - did briefly discuss previous experience with treatment for previous colon cancer; she underwent surgery which she tolerated well, but discontinued chemotherapy after 1 course due not tolerating well; briefly discussed lung/liver imaging and would allow for PCCM and IR to discuss futher, but pt wishes to proceed with Bx if recommended, as she would like to know diagnosis; will continue GOC/ACP discussion once consulted by PCCM, IR, and Oncology, but pt and  did express would likely not be interested in aggressive treatment   - desire for DNR/DNI confirmed   - emotional support provided   - Allowed time for questions/concerns; all addressed; expressed availability of myself/palliative team for additional questions/concerns   - updated primary, PCCM, and neurology (Dr. Ernst); later followed up with SINAN Johns to assess pt's pain well controlled with current regiment; will continue to assess pain management plan and assess for need for extended release     Mass of right lung  Mass of liver   Chest Pain   Cancer related pain   - will allow for PCCM, IR, and oncology input for further GOC/ACP discussions   - IR consulted per PCCM for possible biopsy for MIV approach to diagnosis and staging of potential lung cancer   - 10/5: pt with 10/10 pain to lower chest/diaphragm/upper abdomen unrelieved with oxycodone 5mg (home  "medication that pt normally requires at minimum BID per pt), pt also tachypneic and having tremors; per symptom management conversation with primary, Morphine 2mg IV PRN Q4H ordered for breakthrough pain  - 10/6: pain better controlled overall per pt and visual assessment; pt much more relaxed, breathing non-labored, tremors appear resolved; rates pain 8/10 mainly to chest area; only given morphine once yesterday, had oxycodone a few hours prior discussed pain management with pt and RN, plan for morphine dose now, pt and RN to report symptom relief; will have on going pain education and planning with pt and care team as pt likely transitioning to hospice care following biopsy for diagnosis confirmation   - discussed bowel regimen; pt very reluctant to take laxatives since episode of multiple loose stools 2 days ago, which was last BM; bowel regimen education provided to pt regarding narcotic pain meds; pt also NPO today, will resume ordered regimen tomorrow; if no BM will recommend addition of [place]      Chronic respiratory failure with hypercapnia  Postobstructive pneumonia  - O2 via NC  - PCCM consulted/following   - management per primary/PCCM      Alcoholic cirrhosis of liver without ascites  - history noted   - pt now with liver lesions  - management per primary     History of malignant neoplasm of cecum  - treated surgically per ; pt tolerated one course of chemotherapy   - history noted     Dementia without behavioral disturbance  - oriented to person, place, time, situation; however did not recall being informed of lung/liver lesions by Dr. Doan, and later did not recall my discussion per PCCM   - will plan to assess baseline further with  and further assess pt when pain is under better control and able to participate     Thank you for your consult. I will follow-up with patient. Please contact us if you have any additional questions.    Subjective:     HPI:   From H&P: " 83 y/o female with " "a significant medical history of blindness, anxiety/depression, GERD, osteoporosis, malignant Colon CA, HTN, HLD, iron deficiency anemia, neuralgia presents to the hospital for evaluation of back pain, chest pain, SOB, and weakness that began "a few days ago."     The patient resides at [facility] and states she uses a walker at baseline.  She states she has been having severe back pain that has made it increasingly difficult for her to ambulate.  She states when she was using her walker she did not get SOB but she only had to walk a short distance to get to the restroom in her room.  She is not on home oxygen at baseline.  She points to the center of her chest as the area of her pain and describes it "burning".  She denies fever, cough, numbness, N/V/D.       She endorses dysuria, HA, and lightheadedness.  Currently the patient is awake and oriented x 4.  She endorses back pain, 5/10 at this time.  She also has epigastric "burning".  She has movement and sensation in all extremities.  ED workup included an elevated D-dimer (3.25), low K+ (2.3), UA positive for UTI, and an abnormal CTA Chest that revealed a 5.3 x 7.6 x 4.4 cm mass in the right middle and lower lobe along with cirrhotic changes and multiple lesions in the liver and probable chronic compression fractures of T11, 12, and L1.     She was given Dilaudid, Vanc, Zosyn in the ED.  She is satting 95% on 2L NC O2."    Palliative medicine consulted for advance care planning and continuity of care; Met with pt at bedside; for details of visit, see advance care planning section of plan.         Hospital Course:  No notes on file    Past Medical History:   Diagnosis Date    Arthritis     Blind in both eyes     Cancer     Colon cancer     Depression     GERD (gastroesophageal reflux disease)     History of radiofrequency ablation procedure for cardiac arrhythmia     Hypertension     Memory loss     Osteoporosis     Palpitations     Port-A-Cath in " place 2020    Right    Sleep apnea     Stroke-like symptoms 2021    slurred speech, BLE weakness (Lt greater than Rt)       Past Surgical History:   Procedure Laterality Date    APPENDECTOMY       SECTION      x2    COLON SURGERY  2020    CYSTOSCOPY WITH URETEROSCOPY, RETROGRADE PYELOGRAPHY, AND INSERTION OF STENT Bilateral 2020    Procedure: CYSTOSCOPY, WITH RETROGRADE PYELOGRAM AND URETERAL STENT INSERTION;  Surgeon: Toni Nguyen MD;  Location: [facility] OR;  Service: Urology;  Laterality: Bilateral;    EYE SURGERY Left     RETINA SURGERY OD    ENUCLEATION  OS    FRACTURE SURGERY Left     Hip, hardware present    FRACTURE SURGERY Left     Ankle    HYSTERECTOMY      INSERTION OF TUNNELED CENTRAL VENOUS CATHETER (CVC) WITH SUBCUTANEOUS PORT N/A 2020    Procedure: INSERTION, PORT-A-CATH;  Surgeon: Ranjit Ott III, MD;  Location: [facility] OR;  Service: General;  Laterality: N/A;  PRE-OP BY RN 2020---COVID NEGATIVE ON     KNEE SURGERY Left     LAPAROSCOPIC RIGHT COLON RESECTION Right 2020    Procedure: COLECTOMY, RIGHT, LAPAROSCOPIC;  Surgeon: Ranjit Ott III, MD;  Location: [facility] OR;  Service: General;  Laterality: Right;  COMBINED SURGERY WITH DR. NGUYEN  COVID NEGATIVE 20    ROTATOR CUFF REPAIR      L arm    TONSILLECTOMY         Review of patient's allergies indicates:   Allergen Reactions    Lyrica [pregabalin]        Medications:  Continuous Infusions:  Scheduled Meds:   albuterol-ipratropium  3 mL Nebulization Q6H    aspirin  81 mg Oral Daily    atorvastatin  20 mg Oral Daily    donepeziL  10 mg Oral QHS    DULoxetine  30 mg Oral Nightly    ergocalciferol  50,000 Units Oral Q7 Days    EScitalopram oxalate  10 mg Oral Daily    ferrous sulfate  1 tablet Oral Daily    gabapentin  300 mg Oral TID    glycopyrrolate  1 mg Oral BID    heparin (porcine)  5,000 Units Subcutaneous Q8H    hydrALAZINE  25 mg Oral Q8H    hydrOXYzine pamoate  25 mg Oral QHS  "   levoFLOXacin  750 mg Intravenous Q24H    lisinopriL  40 mg Oral Daily    memantine  5 mg Oral Daily    mirtazapine  30 mg Oral QHS    pantoprazole  40 mg Oral Daily    pentoxifylline  400 mg Oral TID WM    polyethylene glycol  17 g Oral Daily    potassium bicarbonate  25 mEq Oral BID    rOPINIRole  0.25 mg Oral BID    vitamin D  1,000 Units Oral Daily     PRN Meds:acetaminophen, albuterol-ipratropium, aluminum-magnesium hydroxide-simethicone, benzonatate, dextrose 10%, dextrose 10%, glucagon (human recombinant), glucose, glucose, insulin aspart U-100, melatonin, morphine, naloxone, ondansetron, oxyCODONE, prochlorperazine, senna-docusate 8.6-50 mg, simethicone, sodium chloride 0.9%, tiZANidine    Family History    None       Tobacco Use    Smoking status: Former     Packs/day: 0.50     Types: Cigarettes     Quit date: 4/6/2019     Years since quitting: 3.5    Smokeless tobacco: Never   Substance and Sexual Activity    Alcohol use: No    Drug use: No    Sexual activity: Not Currently       Review of Systems   Constitutional:  Positive for activity change and fatigue. Negative for fever.   Eyes:         Blind    Respiratory:  Positive for shortness of breath. Negative for cough.    Cardiovascular:  Positive for chest pain.        Pain to diaphragm/sternal/upper abdomen area under better control today but still painful rated 8/10, described as sharp and aching    Gastrointestinal:  Positive for abdominal pain. Negative for constipation, nausea and vomiting.   Musculoskeletal:  Positive for arthralgias and joint swelling.        8/10 pain "all over" but most significant to lower chest/upper abdomen as above    Skin:         Reports burning of skin "all over", improved   Neurological:  Positive for weakness. Negative for dizziness, syncope and headaches.        Body tremors now improved    Objective:     Vital Signs (Most Recent):  Temp: 98 °F (36.7 °C) (10/06/22 1326)  Pulse: 83 (10/06/22 1326)  Resp: 18 (10/06/22 " 1326)  BP: (!) 169/82 (10/06/22 1326)  SpO2: (!) 94 % (10/06/22 1326)   Vital Signs (24h Range):  Temp:  [98 °F (36.7 °C)-98.5 °F (36.9 °C)] 98 °F (36.7 °C)  Pulse:  [72-87] 83  Resp:  [16-20] 18  SpO2:  [92 %-96 %] 94 %  BP: (143-179)/(74-89) 169/82     Weight: 79.4 kg (175 lb 0.7 oz)  Body mass index is 28.25 kg/m².    Physical Exam  Constitutional:       Appearance: She is ill-appearing.   HENT:      Head: Normocephalic and atraumatic.      Nose: Nose normal.      Mouth/Throat:      Mouth: Mucous membranes are moist.   Pulmonary:      Effort: Pulmonary effort is normal. Tachypnea present. No respiratory distress.      Comments: O2 via NC in place; improved work of breathing from initial assessment   Neurological:      Mental Status: She is alert and oriented to person, place, and time.     Significant Labs: All pertinent labs within the past 24 hours have been reviewed.  CBC:   Recent Labs   Lab 10/06/22  0400   WBC 7.06   HGB 12.7   HCT 39.8   MCV 83   *       BMP:  Recent Labs   Lab 10/06/22  0400   *      K 2.5*   CL 90*   CO2 41*   BUN 23   CREATININE 0.7   CALCIUM 7.4*   MG 1.8       LFT:  Lab Results   Component Value Date    AST 37 10/06/2022    ALKPHOS 119 10/06/2022    BILITOT 0.8 10/06/2022     Albumin:   Albumin   Date Value Ref Range Status   10/06/2022 3.2 (L) 3.5 - 5.2 g/dL Final     Protein:   Total Protein   Date Value Ref Range Status   10/06/2022 5.5 (L) 6.0 - 8.4 g/dL Final     Lactic acid:   Lab Results   Component Value Date    LACTATE 0.9 10/31/2020    LACTATE 2.0 01/14/2019     Significant Imaging: I have reviewed all pertinent imaging results/findings within the past 24 hours.      Total visit time: 55 minutes    > 50% of  35  min visit spent in chart review, face to face discussion of symptom assessment, coordination of care with other specialists, and discharge planning.    20 min ACP time spent: goals of care, emotional support, formulating and communicating  prognosis, exploring burden/ benefit of various approaches of treatment.     Gisel Layton NP  Palliative Medicine  [facility]

## 2022-10-06 NOTE — PROGRESS NOTES
"[facility]  Progress Note    Patient Name: Annette Fallon  MRN: [id number]  Patient Class: IP- Inpatient   Admission Date: 10/4/2022  Length of Stay: 2 days  Attending Physician: Oscar Doan III, MD  Primary Care Provider: [facility]        Subjective:     Principal Problem:Mass of right lung        HPI:  83 y/o female with a significant medical history of blindness, anxiety/depression, GERD, osteoporosis, malignant Colon CA, HTN, HLD, iron deficiency anemia, neuralgia presents to the hospital for evaluation of back pain, chest pain, SOB, and weakness that began "a few days ago."    The patient resides at [facility] and states she uses a walker at baseline.  She states she has been having severe back pain that has made it increasingly difficult for her to ambulate.  She states when she was using her walker she did not get SOB but she only had to walk a short distance to get to the restroom in her room.  She is not on home oxygen at baseline.  She points to the center of her chest as the area of her pain and describes it "burning".  She denies fever, cough, numbness, N/V/D.      She endorses dysuria, HA, and lightheadedness.  Currently the patient is awake and oriented x 4.  She endorses back pain, 5/10 at this time.  She also has epigastric "burning".  She has movement and sensation in all extremities.  ED workup included an elevated D-dimer (3.25), low K+ (2.3), UA positive for UTI, and an abnormal CTA Chest that revealed a 5.3 x 7.6 x 4.4 cm mass in the right middle and lower lobe along with cirrhotic changes and multiple lesions in the liver and probable chronic compression fractures of T11, 12, and L1.    She was given Dilaudid, Vanc, Zosyn in the ED.  She is satting 95% on 2L NC O2.      Overview/Hospital Course:  No notes on file    Interval History: Pt no longer having tremors of the upper extremities and states they went away overnight. Pt also states " she is feeling better with less pain currently. Discussed with pt plan for possible biopsy of the liver which she understands. Pt denies cp, sob, fever, chills, n/v at this time    Review of Systems  Objective:     Vital Signs (Most Recent):  Temp: 98.2 °F (36.8 °C) (10/06/22 0529)  Pulse: 83 (10/06/22 0755)  Resp: 18 (10/06/22 0946)  BP: (!) 179/84 (10/06/22 0529)  SpO2: (!) 94 % (10/06/22 0755)   Vital Signs (24h Range):  Temp:  [98 °F (36.7 °C)-98.5 °F (36.9 °C)] 98.2 °F (36.8 °C)  Pulse:  [72-87] 83  Resp:  [16-20] 18  SpO2:  [92 %-94 %] 94 %  BP: (143-179)/(74-89) 179/84     Weight: 79.4 kg (175 lb 0.7 oz)  Body mass index is 28.25 kg/m².    Intake/Output Summary (Last 24 hours) at 10/6/2022 1138  Last data filed at 10/5/2022 1905  Gross per 24 hour   Intake 240 ml   Output 450 ml   Net -210 ml      Physical Exam  Vitals reviewed.   HENT:      Head: Normocephalic and atraumatic.      Nose: No congestion or rhinorrhea.      Mouth/Throat:      Mouth: Mucous membranes are moist.      Pharynx: Oropharynx is clear.   Eyes:      General:         Right eye: No discharge.         Left eye: No discharge.   Cardiovascular:      Rate and Rhythm: Normal rate and regular rhythm.   Pulmonary:      Effort: Pulmonary effort is normal. No respiratory distress.   Abdominal:      Palpations: Abdomen is soft.      Tenderness: There is no abdominal tenderness.   Musculoskeletal:         General: No swelling or tenderness.      Cervical back: Neck supple. No rigidity.   Skin:     General: Skin is warm and dry.   Neurological:      Mental Status: She is alert.       Significant Labs: All pertinent labs within the past 24 hours have been reviewed.    Significant Imaging: I have reviewed all pertinent imaging results/findings within the past 24 hours.      Assessment/Plan:      * Mass of right lung  -CTA chest obtained today with a 5.3 x 7.6 x 4.4 cm mass in the right middle lobe adjacent to the right lower lobe, possibly a neoplastic  process per report.  -Consult Pulmonology, Heme-Onc, and Palliative  -She was given Vanc and Zosyn in the ED.  Will continue levaquin          Postobstructive pneumonia  This is a somewhat equivocal diagnosis  She has no systemic symptoms of infection, no cough, no fever, no leukocytosis  Her procal is up to 3.27, though this can go up in malignancy too    She got Vanco and Zosyn in the ED  Will continue a 5 day course of Levaquin for now      Malignant neoplasm of cecum  Treatment Summary   Plan Name: OP FOLFOX 6 Q2W  Treatment Goal: Curative  Status: Active  Start Date: 2/2/2021  End Date: 7/15/2021 (Planned)  Provider: Khushbu Walden MD  Chemotherapy: fluorouraciL injection 770 mg, 400 mg/m2 = 770 mg, Intravenous, Clinic/HOD 1 time, 2 of 12 cycles  Administration: 770 mg (2/2/2021)  fluorouracil (ADRUCIL) 2,400 mg/m2 = 4,610 mg in sodium chloride 0.9% 240 mL chemo infusion, 2,400 mg/m2 = 4,610 mg, Intravenous, Over 46 hours, 2 of 12 cycles  Administration: 4,610 mg (2/2/2021)  leucovorin calcium 400 mg/m2 = 770 mg in dextrose 5 % 250 mL infusion, 400 mg/m2 = 770 mg, Intravenous, Clinic/HOD 1 time, 2 of 12 cycles  Administration: 770 mg (2/2/2021)  oxaliplatin (ELOXATIN) 85 mg/m2 = 163 mg in dextrose 5 % 500 mL chemo infusion, 85 mg/m2 = 163 mg, Intravenous, Clinic/HOD 1 time, 2 of 12 cycles  Administration: 163 mg (2/2/2021)    According to pt she was unable to tolerate the treatments and did not complete her course of chemo.  Likely cause of liver mass and lung mass is metastatic disease in the setting of untreated cancer.  -IR guided biopsy planned once potassium improved.        Alcoholic cirrhosis of liver without ascites  -Noted on CT  No prior recorded history of cirrhosis, however the patient has a long-standing history of heavy ETOH use in the past.  -AFP, INR obtained    MELD-Na score: 7 at 10/6/2022  4:00 AM  MELD score: 7 at 10/6/2022  4:00 AM  Calculated from:  Serum Creatinine: 0.7 mg/dL  (Using min of 1 mg/dL) at 10/6/2022  4:00 AM  Serum Sodium: 140 mmol/L (Using max of 137 mmol/L) at 10/6/2022  4:00 AM  Total Bilirubin: 0.8 mg/dL (Using min of 1 mg/dL) at 10/6/2022  4:00 AM  INR(ratio): 1.1 at 10/4/2022 10:50 PM  Age: 82 years      Chronic respiratory failure with hypercapnia  Patient with Hypercapnic Respiratory failure which is Chronic.  she is not on home oxygen.   Supplemental oxygen was provided and noted good O2 sats.  Signs/symptoms of respiratory failure include- increased work of breathing.   Contributing diagnoses includes - COPD, Pneumonia and lung cancer   Labs and images were reviewed. Patient Has recent ABG, which has been reviewed.   Will treat underlying causes and adjust management of respiratory failure as follows- Duonebs.  -Pt would likely benefit from outpatient sleep study as sleep apnea appears likely.     Elevated troponin level not due to acute coronary syndrome  I do not suspect ACS  Likely demand from lung mass and +/- PNA  Trop is flat thus far  TTE with ef of 65%.     Latest Reference Range & Units 10/04/22 14:11 10/04/22 22:50   Troponin I 0.000 - 0.026 ng/mL 0.063 (H) 0.066 (H)            Hypokalemia  In the ED she got K-lyte 40 meq po x 1  K barely came up  -Down to 2.5 on 10/6 despite supplementation    -supplementing iv and oral will recheck in afternoon.        Dementia without behavioral disturbance  -Delirium precautions  -Continue home donepezil and memantine    Essential hypertension  -BPs elevated in the ED.  Received 1 x dose labetalol 10 mg in the ED.    -Continue home BP meds        VTE Risk Mitigation (From admission, onward)         Ordered     heparin (porcine) injection 5,000 Units  Every 8 hours         10/04/22 2156     Place ZARINA hose  Until discontinued         10/04/22 2156     IP VTE HIGH RISK PATIENT  Once         10/04/22 2156     Place sequential compression device  Until discontinued         10/04/22 2156                Discharge Planning    JULES:      Code Status: DNR   Is the patient medically ready for discharge?:     Reason for patient still in hospital (select all that apply): Treatment                     Oscar Doan III, MD  Department of [facility] Medicine   [facility]

## 2022-10-06 NOTE — PLAN OF CARE
CTAP with apparent liver lesions and lymphadenopathy. Pending IR biopsy. Not candidate for palliative treatment. Discussing possible hospice with palliative care, which seems like the best option given the degree of pain and her concerns regarding such. Eval for home O2 prior to discharge if her disposition warrants. We will be available for question. Please feel free to reach out.

## 2022-10-06 NOTE — HPI
82-year-old blind female former smoker with history of cecal cancer intolerant of treatment, HTN, hyperlipidemia, chronic pain, anxiety/depression presented with few days of weakness, back pain , chest pain and SOB . Comes from [facility]. .  She states she has been having severe back pain that has made it increasingly difficult for her to ambulate.  She states when she was using her walker she did not get SOB but she only had to walk a short distance to get to the restroom in her room.  She is not on home oxygen at baseline. Workup includes CTA Chest that revealed a 5.3 x 7.6 x 4.4 cm mass in the right middle and lower lobe along with cirrhotic changes and multiple lesions in the liver and probable chronic compression fractures of T11, 12, and L1. Consulted for lung mass.     Additionally compl

## 2022-10-06 NOTE — SUBJECTIVE & OBJECTIVE
Past Medical History:   Diagnosis Date    Arthritis     Blind in both eyes     Cancer     Colon cancer     Depression     GERD (gastroesophageal reflux disease)     History of radiofrequency ablation procedure for cardiac arrhythmia     Hypertension     Memory loss     Osteoporosis     Palpitations     Port-A-Cath in place 2020    Right    Sleep apnea     Stroke-like symptoms 2021    slurred speech, BLE weakness (Lt greater than Rt)       Past Surgical History:   Procedure Laterality Date    APPENDECTOMY       SECTION      x2    COLON SURGERY  2020    CYSTOSCOPY WITH URETEROSCOPY, RETROGRADE PYELOGRAPHY, AND INSERTION OF STENT Bilateral 2020    Procedure: CYSTOSCOPY, WITH RETROGRADE PYELOGRAM AND URETERAL STENT INSERTION;  Surgeon: Toni Nguyen MD;  Location: [facility] OR;  Service: Urology;  Laterality: Bilateral;    EYE SURGERY Left     RETINA SURGERY OD    ENUCLEATION  OS    FRACTURE SURGERY Left     Hip, hardware present    FRACTURE SURGERY Left     Ankle    HYSTERECTOMY      INSERTION OF TUNNELED CENTRAL VENOUS CATHETER (CVC) WITH SUBCUTANEOUS PORT N/A 2020    Procedure: INSERTION, PORT-A-CATH;  Surgeon: Ranjit Ott III, MD;  Location: [facility] OR;  Service: General;  Laterality: N/A;  PRE-OP BY RN 2020---COVID NEGATIVE ON     KNEE SURGERY Left     LAPAROSCOPIC RIGHT COLON RESECTION Right 2020    Procedure: COLECTOMY, RIGHT, LAPAROSCOPIC;  Surgeon: Ranjit Ott III, MD;  Location: [facility] OR;  Service: General;  Laterality: Right;  COMBINED SURGERY WITH DR. NGUYEN  COVID NEGATIVE 20    ROTATOR CUFF REPAIR      L arm    TONSILLECTOMY         Review of patient's allergies indicates:   Allergen Reactions    Lyrica [pregabalin]        Medications:  Continuous Infusions:  Scheduled Meds:   albuterol-ipratropium  3 mL Nebulization Q6H    aspirin  81 mg Oral Daily    atorvastatin  20 mg Oral Daily    donepeziL  10 mg Oral QHS    DULoxetine  30 mg Oral  "Nightly    ergocalciferol  50,000 Units Oral Q7 Days    EScitalopram oxalate  10 mg Oral Daily    ferrous sulfate  1 tablet Oral Daily    gabapentin  300 mg Oral TID    glycopyrrolate  1 mg Oral BID    heparin (porcine)  5,000 Units Subcutaneous Q8H    hydrALAZINE  25 mg Oral Q8H    hydrOXYzine pamoate  25 mg Oral QHS    levoFLOXacin  750 mg Intravenous Q24H    lisinopriL  40 mg Oral Daily    memantine  5 mg Oral Daily    mirtazapine  30 mg Oral QHS    pantoprazole  40 mg Oral Daily    pentoxifylline  400 mg Oral TID WM    polyethylene glycol  17 g Oral Daily    potassium bicarbonate  25 mEq Oral BID    rOPINIRole  0.25 mg Oral BID    vitamin D  1,000 Units Oral Daily     PRN Meds:acetaminophen, albuterol-ipratropium, aluminum-magnesium hydroxide-simethicone, benzonatate, dextrose 10%, dextrose 10%, glucagon (human recombinant), glucose, glucose, insulin aspart U-100, melatonin, morphine, naloxone, ondansetron, oxyCODONE, prochlorperazine, senna-docusate 8.6-50 mg, simethicone, sodium chloride 0.9%, tiZANidine    Family History    None       Tobacco Use    Smoking status: Former     Packs/day: 0.50     Types: Cigarettes     Quit date: 4/6/2019     Years since quitting: 3.5    Smokeless tobacco: Never   Substance and Sexual Activity    Alcohol use: No    Drug use: No    Sexual activity: Not Currently       Review of Systems   Constitutional:  Positive for activity change and fatigue. Negative for fever.   Eyes:         Blind    Respiratory:  Positive for shortness of breath. Negative for cough.    Cardiovascular:  Positive for chest pain.        Pain to diaphragm/sternal/upper abdomen area under better control today but still painful rated 8/10, described as sharp and aching    Gastrointestinal:  Positive for abdominal pain. Negative for constipation, nausea and vomiting.   Musculoskeletal:  Positive for arthralgias and joint swelling.        8/10 pain "all over" but most significant to lower chest/upper abdomen as " "above    Skin:         Reports burning of skin "all over", improved   Neurological:  Positive for weakness. Negative for dizziness, syncope and headaches.        Body tremors now improved    Objective:     Vital Signs (Most Recent):  Temp: 98 °F (36.7 °C) (10/06/22 1326)  Pulse: 83 (10/06/22 1326)  Resp: 18 (10/06/22 1326)  BP: (!) 169/82 (10/06/22 1326)  SpO2: (!) 94 % (10/06/22 1326)   Vital Signs (24h Range):  Temp:  [98 °F (36.7 °C)-98.5 °F (36.9 °C)] 98 °F (36.7 °C)  Pulse:  [72-87] 83  Resp:  [16-20] 18  SpO2:  [92 %-96 %] 94 %  BP: (143-179)/(74-89) 169/82     Weight: 79.4 kg (175 lb 0.7 oz)  Body mass index is 28.25 kg/m².    Physical Exam  Constitutional:       Appearance: She is ill-appearing.   HENT:      Head: Normocephalic and atraumatic.      Nose: Nose normal.      Mouth/Throat:      Mouth: Mucous membranes are moist.   Pulmonary:      Effort: Pulmonary effort is normal. Tachypnea present. No respiratory distress.      Comments: O2 via NC in place; improved work of breathing from initial assessment   Neurological:      Mental Status: She is alert and oriented to person, place, and time.     Significant Labs: All pertinent labs within the past 24 hours have been reviewed.  CBC:   Recent Labs   Lab 10/06/22  0400   WBC 7.06   HGB 12.7   HCT 39.8   MCV 83   *       BMP:  Recent Labs   Lab 10/06/22  0400   *      K 2.5*   CL 90*   CO2 41*   BUN 23   CREATININE 0.7   CALCIUM 7.4*   MG 1.8       LFT:  Lab Results   Component Value Date    AST 37 10/06/2022    ALKPHOS 119 10/06/2022    BILITOT 0.8 10/06/2022     Albumin:   Albumin   Date Value Ref Range Status   10/06/2022 3.2 (L) 3.5 - 5.2 g/dL Final     Protein:   Total Protein   Date Value Ref Range Status   10/06/2022 5.5 (L) 6.0 - 8.4 g/dL Final     Lactic acid:   Lab Results   Component Value Date    LACTATE 0.9 10/31/2020    LACTATE 2.0 01/14/2019     Significant Imaging: I have reviewed all pertinent imaging results/findings " within the past 24 hours.

## 2022-10-06 NOTE — ASSESSMENT & PLAN NOTE
Hx of  Carcinoma Cecum pT4a,pN1b.  Stage 3b. S/p rt colectomy 11/2020   Pt did not complete planned adjuvant chemo 2/2 toxicity

## 2022-10-06 NOTE — PLAN OF CARE
Problem: Adult Inpatient Plan of Care  Goal: Plan of Care Review  Outcome: Ongoing, Progressing  Goal: Patient-Specific Goal (Individualized)  Outcome: Ongoing, Progressing  Goal: Absence of Hospital-Acquired Illness or Injury  Outcome: Ongoing, Progressing  Goal: Optimal Comfort and Wellbeing  Outcome: Ongoing, Progressing  Goal: Readiness for Transition of Care  Outcome: Ongoing, Progressing     Problem: Coping Ineffective  Goal: Effective Coping  Outcome: Ongoing, Progressing     Problem: Infection  Goal: Absence of Infection Signs and Symptoms  Outcome: Ongoing, Progressing     Problem: Fluid Imbalance (Pneumonia)  Goal: Fluid Balance  Outcome: Ongoing, Progressing     Problem: Infection (Pneumonia)  Goal: Resolution of Infection Signs and Symptoms  Outcome: Ongoing, Progressing     Problem: Respiratory Compromise (Pneumonia)  Goal: Effective Oxygenation and Ventilation  Outcome: Ongoing, Progressing     Problem: Skin Injury Risk Increased  Goal: Skin Health and Integrity  Outcome: Ongoing, Progressing     Problem: Fall Injury Risk  Goal: Absence of Fall and Fall-Related Injury  Outcome: Ongoing, Progressing

## 2022-10-06 NOTE — ASSESSMENT & PLAN NOTE
Patient with Hypercapnic Respiratory failure which is Chronic.  she is not on home oxygen.   Supplemental oxygen was provided and noted good O2 sats.  Signs/symptoms of respiratory failure include- increased work of breathing.   Contributing diagnoses includes - COPD, Pneumonia and lung cancer   Labs and images were reviewed. Patient Has recent ABG, which has been reviewed.   Will treat underlying causes and adjust management of respiratory failure as follows- Roxi.  -Pt would likely benefit from outpatient sleep study as sleep apnea appears likely.

## 2022-10-06 NOTE — PLAN OF CARE
Problem: Adult Inpatient Plan of Care  Goal: Plan of Care Review  Outcome: Ongoing, Progressing     Problem: Coping Ineffective  Goal: Effective Coping  Outcome: Ongoing, Progressing  Intervention: Support and Enhance Coping Strategies  Flowsheets (Taken 10/6/2022 0354)  Supportive Measures: active listening utilized  Environmental Support:   calm environment promoted   caregiver consistency promoted     Problem: Infection  Goal: Absence of Infection Signs and Symptoms  Outcome: Ongoing, Progressing     Problem: Fluid Imbalance (Pneumonia)  Goal: Fluid Balance  Outcome: Ongoing, Progressing     Problem: Skin Injury Risk Increased  Goal: Skin Health and Integrity  Outcome: Ongoing, Progressing     Problem: Fall Injury Risk  Goal: Absence of Fall and Fall-Related Injury  Outcome: Ongoing, Progressing  Intervention: Promote Injury-Free Environment  Flowsheets (Taken 10/6/2022 0354)  Safety Promotion/Fall Prevention:   assistive device/personal item within reach   bed alarm set   side rails raised x 2   instructed to call staff for mobility   lighting adjusted   Patient NPO at midnight for liver biopsy on today.  Prn administered for pain.  Asleep no signs of pain noted.  Poc updated.  Very pleasant.  Will continue to monitor.

## 2022-10-06 NOTE — PLAN OF CARE
SW sent referral to [facility] and sent message to Mahnaz to follow up with patient and family to coordinate informational.        10/06/22 9629   Post-Acute Status   Post-Acute Authorization Hospice   Hospice Status Referrals Sent   Discharge Delays (!) Post-Acute Set-up   Discharge Plan   Discharge Plan A Hospice/home;Return to nursing home   Discharge Plan B Return to Nursing Home

## 2022-10-06 NOTE — ASSESSMENT & PLAN NOTE
-Noted on CT  No prior recorded history of cirrhosis, however the patient has a long-standing history of heavy ETOH use in the past.  -AFP, INR obtained    MELD-Na score: 7 at 10/6/2022  4:00 AM  MELD score: 7 at 10/6/2022  4:00 AM  Calculated from:  Serum Creatinine: 0.7 mg/dL (Using min of 1 mg/dL) at 10/6/2022  4:00 AM  Serum Sodium: 140 mmol/L (Using max of 137 mmol/L) at 10/6/2022  4:00 AM  Total Bilirubin: 0.8 mg/dL (Using min of 1 mg/dL) at 10/6/2022  4:00 AM  INR(ratio): 1.1 at 10/4/2022 10:50 PM  Age: 82 years

## 2022-10-06 NOTE — ASSESSMENT & PLAN NOTE
I do not suspect ACS  Likely demand from lung mass and +/- PNA  Trop is flat thus far  TTE with ef of 65%.     Latest Reference Range & Units 10/04/22 14:11 10/04/22 22:50   Troponin I 0.000 - 0.026 ng/mL 0.063 (H) 0.066 (H)

## 2022-10-06 NOTE — ASSESSMENT & PLAN NOTE
In the ED she got K-lyte 40 meq po x 1  K barely came up  -Down to 2.5 on 10/6 despite supplementation    -supplementing iv and oral will recheck in afternoon.

## 2022-10-06 NOTE — CONSULTS
[facility] - [place] - Telemetry  Hematology/Oncology  Consult Note    Patient Name: Annette Fallon  MRN: [id number]  Admission Date: 10/4/2022  Hospital Length of Stay: 1 days  Code Status: DNR   Attending Provider: Oscar Doan III, MD  Consulting Provider: Soha Martins MD  Primary Care Physician: Willa Friedman  Principal Problem:Mass of right lung    Inpatient consult to Telemedicine - Oncology  Consult performed by: Soha Martins MD  Consult ordered by: Soha Martins MD      Subjective:     HPI:  82-year-old blind female former smoker with history of cecal cancer intolerant of treatment, HTN, hyperlipidemia, chronic pain, anxiety/depression presented with few days of weakness, back pain , chest pain and SOB . Comes from [facility]. .  She states she has been having severe back pain that has made it increasingly difficult for her to ambulate.  She states when she was using her walker she did not get SOB but she only had to walk a short distance to get to the restroom in her room.  She is not on home oxygen at baseline. Workup includes CTA Chest that revealed a 5.3 x 7.6 x 4.4 cm mass in the right middle and lower lobe along with cirrhotic changes and multiple lesions in the liver and probable chronic compression fractures of T11, 12, and L1. Consulted for lung mass.     Additionally compl        VIRTUAL TELENOTE    Start time: 2pm  Chief complaint: SOB  The patient location is: [facility]  The patient arrived at:  2pm   Present with the patient at the time of the telemed/virtual assessment:alone    End time:  15 min    Total time spent with patient: 15 min    The attending portion of this evaluation, treatment, and documentation was performed per Soha Martins MD via Telemedicine AudioVisual using the secure [employer] software platform with 2 way audio/video. The provider was located off-site and the patient is located in the hospital. The aforementioned video software was utilized to  document the relevant history and physical exam.       Oncology Treatment Plan:   OP FOLFOX 6 Q2W    Medications:  Continuous Infusions:  Scheduled Meds:   albuterol-ipratropium  3 mL Nebulization Q6H    aspirin  81 mg Oral Daily    atorvastatin  20 mg Oral Daily    dextromethorphan-guaiFENesin  mg/5 ml  10 mL Oral Q6H    donepeziL  10 mg Oral QHS    DULoxetine  30 mg Oral Nightly    ergocalciferol  50,000 Units Oral Q7 Days    EScitalopram oxalate  10 mg Oral Daily    ferrous sulfate  1 tablet Oral Daily    gabapentin  300 mg Oral TID    glycopyrrolate  1 mg Oral BID    heparin (porcine)  5,000 Units Subcutaneous Q8H    hydrALAZINE  25 mg Oral Q8H    hydrOXYzine pamoate  25 mg Oral QHS    levoFLOXacin  750 mg Intravenous Q24H    lisinopriL  40 mg Oral Daily    memantine  5 mg Oral Daily    mirtazapine  30 mg Oral QHS    pantoprazole  40 mg Oral Daily    pentoxifylline  400 mg Oral TID WM    polyethylene glycol  17 g Oral Daily    rOPINIRole  0.25 mg Oral BID    vitamin D  1,000 Units Oral Daily     PRN Meds:acetaminophen, albuterol-ipratropium, aluminum-magnesium hydroxide-simethicone, benzonatate, dextrose 10%, dextrose 10%, glucagon (human recombinant), glucose, glucose, insulin aspart U-100, melatonin, morphine, naloxone, ondansetron, oxyCODONE, prochlorperazine, senna-docusate 8.6-50 mg, simethicone, sodium chloride 0.9%, tiZANidine     Review of patient's allergies indicates:   Allergen Reactions    Lyrica [pregabalin]         Past Medical History:   Diagnosis Date    Arthritis     Blind in both eyes     Cancer     Colon cancer     Depression     GERD (gastroesophageal reflux disease)     History of radiofrequency ablation procedure for cardiac arrhythmia     Hypertension     Memory loss     Osteoporosis     Palpitations     Port-A-Cath in place 12/17/2020    Right    Sleep apnea     Stroke-like symptoms 02/24/2021    slurred speech, BLE weakness (Lt greater than Rt)     Past Surgical History:    Procedure Laterality Date    APPENDECTOMY       SECTION      x2    COLON SURGERY  2020    CYSTOSCOPY WITH URETEROSCOPY, RETROGRADE PYELOGRAPHY, AND INSERTION OF STENT Bilateral 2020    Procedure: CYSTOSCOPY, WITH RETROGRADE PYELOGRAM AND URETERAL STENT INSERTION;  Surgeon: Toni Nguyen MD;  Location: [facility] OR;  Service: Urology;  Laterality: Bilateral;    EYE SURGERY Left     RETINA SURGERY OD    ENUCLEATION  OS    FRACTURE SURGERY Left     Hip, hardware present    FRACTURE SURGERY Left     Ankle    HYSTERECTOMY      INSERTION OF TUNNELED CENTRAL VENOUS CATHETER (CVC) WITH SUBCUTANEOUS PORT N/A 2020    Procedure: INSERTION, PORT-A-CATH;  Surgeon: Ranjit Ott III, MD;  Location: [facility] OR;  Service: General;  Laterality: N/A;  PRE-OP BY RN 2020---COVID NEGATIVE ON     KNEE SURGERY Left     LAPAROSCOPIC RIGHT COLON RESECTION Right 2020    Procedure: COLECTOMY, RIGHT, LAPAROSCOPIC;  Surgeon: Ranjit Ott III, MD;  Location: [facility] OR;  Service: General;  Laterality: Right;  COMBINED SURGERY WITH DR. NGUYEN  COVID NEGATIVE 20    ROTATOR CUFF REPAIR      L arm    TONSILLECTOMY       Family History    None       Tobacco Use    Smoking status: Former     Packs/day: 0.50     Types: Cigarettes     Quit date: 2019     Years since quitting: 3.5    Smokeless tobacco: Never   Substance and Sexual Activity    Alcohol use: No    Drug use: No    Sexual activity: Not Currently       Review of Systems   Constitutional:  Positive for activity change and fatigue. Negative for fever.   Eyes:  Positive for visual disturbance (legally blind).   Respiratory:  Positive for shortness of breath. Negative for cough.    Cardiovascular:  Positive for chest pain. Negative for leg swelling.   Gastrointestinal:  Negative for abdominal pain, diarrhea, nausea and vomiting.   Neurological:  Positive for weakness. Negative for tremors and light-headedness.   Objective:     Vital Signs  (Most Recent):  Temp: 98.2 °F (36.8 °C) (10/05/22 1201)  Pulse: 74 (10/05/22 1348)  Resp: 18 (10/05/22 1348)  BP: (!) 175/82 (10/05/22 1201)  SpO2: (!) 94 % (10/05/22 1348)   Vital Signs (24h Range):  Temp:  [97.4 °F (36.3 °C)-98.7 °F (37.1 °C)] 98.2 °F (36.8 °C)  Pulse:  [60-80] 74  Resp:  [15-35] 18  SpO2:  [91 %-97 %] 94 %  BP: (143-208)/(77-98) 175/82     Weight: 79.4 kg (175 lb 0.7 oz)  Body mass index is 28.25 kg/m².  Body surface area is 1.92 meters squared.    No intake or output data in the 24 hours ending 10/05/22 1415    Physical Exam    Significant Labs:   All pertinent labs within the past 24 hours have been reviewed     Diagnostic Results:  I have reviewed and interpreted all pertinent imaging results/findings within the past 24 hours     Assessment/Plan:     * Mass of right lung  CTA chest shows.Large mass involving the right middle lobe and adjacent right lower lobe measuring  7.6 cm in greatest dimension with associated mediastinal LAD and several hypodense liver lesions concerning for metastatic disease  CT a/p planned to complete staging   CT head negative  IR guided  liver biopsy planned   Patient with poor performance status and  significant co morbidities . Discussed with patient radiographic findings concerning for  advanced malignancy suspicious for lung primary. Palliative chemotherapy risks far outweigh benefits in this setting.  Palliative care following  Await pathology findings.     Malignant neoplasm of cecum  Hx of  Carcinoma Cecum pT4a,pN1b.  Stage 3b. S/p rt colectomy 11/2020   Pt did not complete planned adjuvant chemo 2/2 toxicity       Thank you for consult.     Soha Martins MD  Hematology/Oncology  [facility] [place] [facility]

## 2022-10-06 NOTE — ASSESSMENT & PLAN NOTE
-CTA chest obtained today with a 5.3 x 7.6 x 4.4 cm mass in the right middle lobe adjacent to the right lower lobe, possibly a neoplastic process per report.  -Consult Pulmonology, Heme-Onc, and Palliative  -She was given Vanc and Zosyn in the ED.  Will continue levaquin

## 2022-10-06 NOTE — ASSESSMENT & PLAN NOTE
CTA chest shows.Large mass involving the right middle lobe and adjacent right lower lobe measuring  7.6 cm in greatest dimension with associated mediastinal LAD and several hypodense liver lesions concerning for metastatic disease  CT a/p planned to complete staging   CT head negative  IR guided  liver biopsy planned   Patient with poor performance status and  significant co morbidities . Discussed with patient radiographic findings concerning for  advanced malignancy suspicious for lung primary. Palliative chemotherapy risks outweigh benefits.   Palliative care following  Await pathology findings.

## 2022-10-06 NOTE — ASSESSMENT & PLAN NOTE
Treatment Summary   Plan Name: OP FOLFOX 6 Q2W  Treatment Goal: Curative  Status: Active  Start Date: 2/2/2021  End Date: 7/15/2021 (Planned)  Provider: Khushbu Walden MD  Chemotherapy: fluorouraciL injection 770 mg, 400 mg/m2 = 770 mg, Intravenous, Clinic/HOD 1 time, 2 of 12 cycles  Administration: 770 mg (2/2/2021)  fluorouracil (ADRUCIL) 2,400 mg/m2 = 4,610 mg in sodium chloride 0.9% 240 mL chemo infusion, 2,400 mg/m2 = 4,610 mg, Intravenous, Over 46 hours, 2 of 12 cycles  Administration: 4,610 mg (2/2/2021)  leucovorin calcium 400 mg/m2 = 770 mg in dextrose 5 % 250 mL infusion, 400 mg/m2 = 770 mg, Intravenous, Clinic/HOD 1 time, 2 of 12 cycles  Administration: 770 mg (2/2/2021)  oxaliplatin (ELOXATIN) 85 mg/m2 = 163 mg in dextrose 5 % 500 mL chemo infusion, 85 mg/m2 = 163 mg, Intravenous, Clinic/HOD 1 time, 2 of 12 cycles  Administration: 163 mg (2/2/2021)    According to pt she was unable to tolerate the treatments and did not complete her course of chemo.  Likely cause of liver mass and lung mass is metastatic disease in the setting of untreated cancer.  -IR guided biopsy planned once potassium improved.

## 2022-10-07 NOTE — PLAN OF CARE
[facility]  Department of Hospital Medicine  [address] [place] [address]  [phone] after hours  [phone] fax    HOSPICE  ORDERS    10/07/2022    Admit to Hospice:   Regular Bed with Hospice    Diagnoses:   Active Hospital Problems    Diagnosis  POA    *Mass of right lung [R91.8]  Yes     Priority: 2     Postobstructive pneumonia [J18.9]  Yes     Priority: 1 - High    Malignant neoplasm of cecum [C18.0]  Yes     Priority: 1 - High     Chronic    Alcoholic cirrhosis of liver without ascites [K70.30]  Yes     Priority: 3     Cancer-related pain [G89.3]  Unknown    ACP (advance care planning) [Z71.89]  Not Applicable    Elevated troponin level not due to acute coronary syndrome [R77.8]  Yes    Chronic respiratory failure with hypercapnia [J96.12]  Yes    Hypokalemia [E87.6]  Yes    Dementia without behavioral disturbance [F03.90]  Yes     Chronic    Essential hypertension [I10]  Yes     Chronic    Tobacco abuse [Z72.0]  Yes     Chronic      Resolved Hospital Problems    Diagnosis Date Resolved POA    SOB (shortness of breath) [R06.02] 10/05/2022 Unknown    Chest pain [R07.9] 10/05/2022 Unknown       Hospice Qualifying Diagnoses:        Patient has a life expectancy < 6 months due to:  Primary Hospice Diagnosis:  Metastatic Colon Cancer    Comorbid Conditions Contributing to Decline:  Cirrhosis    Vital Signs: Routine per Hospice Protocol.    Code Status: DNR    Allergies:   Review of patient's allergies indicates:   Allergen Reactions    Lyrica [pregabalin]        Diet: regular    Activities: As tolerated    Goals of Care Treatment Preferences:  Code Status: DNR      Nursing: Per Hospice Routine.    Routine Skin for Bedridden Patients: Apply moisture barrier cream to all skin folds and   wet areas in perineal area daily and after baths and all bowel movements.           Medication List        CONTINUE taking these medications      alendronate 70 MG  tablet  Commonly known as: FOSAMAX  Take 70 mg by mouth every 7 days.     atorvastatin 10 MG tablet  Commonly known as: LIPITOR  Take 20 mg by mouth once daily.     calcium carbonate 500 mg calcium (1,250 mg) tablet  Commonly known as: OS-JOSÉ ANTONIO  Take 1 tablet by mouth once daily.     diphenoxylate-atropine 2.5-0.025 mg 2.5-0.025 mg per tablet  Commonly known as: LOMOTIL  Take 1 tablet by mouth 2 (two) times daily as needed for Diarrhea.     donepeziL 10 MG tablet  Commonly known as: ARICEPT  Take 10 mg by mouth every evening.     DULoxetine 30 MG capsule  Commonly known as: CYMBALTA  Take 30 mg by mouth nightly.     ergocalciferol 50,000 unit Cap  Commonly known as: ERGOCALCIFEROL  Take 50,000 Units by mouth every 7 days.     EScitalopram oxalate 10 MG tablet  Commonly known as: LEXAPRO  Take 10 mg by mouth once daily.     famotidine 20 MG tablet  Commonly known as: PEPCID  Take 1 tablet (20 mg total) by mouth 2 (two) times daily.     ferrous sulfate 325 (65 FE) MG EC tablet  Take 1 tablet (325 mg total) by mouth once daily.     fexofenadine 60 MG tablet  Commonly known as: ALLEGRA  Take 60 mg by mouth once daily.     fluticasone propionate 50 mcg/actuation nasal spray  Commonly known as: FLONASE  1 spray by Each Nostril route 2 (two) times a day.     gabapentin 100 MG capsule  Commonly known as: NEURONTIN  Take 300 mg by mouth 3 (three) times daily.     glycopyrrolate 1 mg Tab  Commonly known as: ROBINUL  Take 1 mg by mouth 2 (two) times daily.     * HYDROcodone-acetaminophen 7.5-325 mg per tablet  Commonly known as: NORCO  Take 1 tablet by mouth 3 (three) times daily as needed.     hydrOXYzine pamoate 50 MG Cap  Commonly known as: VISTARIL  Take 25 mg by mouth every evening.     ibuprofen 800 MG tablet  Commonly known as: ADVIL,MOTRIN  Take 800 mg by mouth 2 (two) times daily.     levoFLOXacin 750 MG tablet  Commonly known as: LEVAQUIN  Take 750 mg by mouth once daily.     lisinopriL 40 MG tablet  Commonly known as:  PRINIVIL,ZESTRIL  Take 40 mg by mouth once daily.     memantine 5 MG Tab  Commonly known as: NAMENDA  Take 5 mg by mouth once daily.     mirtazapine 30 MG tablet  Commonly known as: REMERON  Take 30 mg by mouth every evening.     omeprazole 20 MG capsule  Commonly known as: PRILOSEC  Take 20 mg by mouth once daily.     pentoxifylline 400 mg Tbsr  Commonly known as: TRENTAL  Take 400 mg by mouth 3 (three) times daily with meals.     rOPINIRole 0.25 MG tablet  Commonly known as: REQUIP  Take 0.25 mg by mouth 2 (two) times daily.     TIZANIDINE ORAL  Take 10 mg by mouth every 12 (twelve) hours as needed.     vitamin D 1000 units Tab  Commonly known as: VITAMIN D3  Take 1,000 Units by mouth once daily.           * This list has 1 medication(s) that are the same as other medications prescribed for you. Read the directions carefully, and ask your doctor or other care provider to review them with you.                ASK your doctor about these medications      * HYDROcodone-acetaminophen  mg per tablet  Commonly known as: NORCO  Take 1 tablet by mouth every 4 (four) hours as needed for Pain.           * This list has 1 medication(s) that are the same as other medications prescribed for you. Read the directions carefully, and ask your doctor or other care provider to review them with you.                  Future Orders:  Hospice Medical Director may dictate new orders for comfortable care measures & sign death certificate.        _________________________________  Oscar Doan III, MD  10/07/2022

## 2022-10-07 NOTE — PLAN OF CARE
Problem: Adult Inpatient Plan of Care  Goal: Plan of Care Review  Outcome: Ongoing, Progressing  Goal: Patient-Specific Goal (Individualized)  Outcome: Ongoing, Progressing  Goal: Absence of Hospital-Acquired Illness or Injury  Outcome: Ongoing, Progressing  Goal: Optimal Comfort and Wellbeing  Outcome: Ongoing, Progressing     Problem: Infection  Goal: Absence of Infection Signs and Symptoms  Outcome: Ongoing, Progressing     Problem: Respiratory Compromise (Pneumonia)  Goal: Effective Oxygenation and Ventilation  Outcome: Ongoing, Progressing     Problem: Skin Injury Risk Increased  Goal: Skin Health and Integrity  Outcome: Ongoing, Progressing     Problem: Fall Injury Risk  Goal: Absence of Fall and Fall-Related Injury  Outcome: Ongoing, Progressing

## 2022-10-07 NOTE — PLAN OF CARE
Family signed consents with Mahnaz with [facility]. ARIANA notified Kelsie, admissions at [facility] that patient would be returning to nursing home with hospice. Kelsie informed ARIANA that if patient does not come today, it will have to be Monday.     Dr. Doan notified.        10/07/22 4345   Post-Acute Status   Post-Acute Authorization Hospice   Hospice Status Set-up Complete/Auth obtained   Discharge Delays None known at this time   Discharge Plan   Discharge Plan A Hospice/home;Return to nursing home   Discharge Plan B Return to Nursing Home

## 2022-10-07 NOTE — PLAN OF CARE
[facility]     Department of Hospital Medicine     [address] [place] [address]     [phone] after hours  [phone] fax       NURSING HOME ORDERS    10/07/2022    Admit to Nursing Home:  Regular Bed     with hospice  Diagnoses:  Active Hospital Problems    Diagnosis  POA    *Mass of right lung [R91.8]  Yes     Priority: 2     Postobstructive pneumonia [J18.9]  Yes     Priority: 1 - High    Malignant neoplasm of cecum [C18.0]  Yes     Priority: 1 - High     Chronic    Alcoholic cirrhosis of liver without ascites [K70.30]  Yes     Priority: 3     Cancer-related pain [G89.3]  Unknown    ACP (advance care planning) [Z71.89]  Not Applicable    Elevated troponin level not due to acute coronary syndrome [R77.8]  Yes    Chronic respiratory failure with hypercapnia [J96.12]  Yes    Hypokalemia [E87.6]  Yes    Dementia without behavioral disturbance [F03.90]  Yes     Chronic    Essential hypertension [I10]  Yes     Chronic    Tobacco abuse [Z72.0]  Yes     Chronic      Resolved Hospital Problems    Diagnosis Date Resolved POA    SOB (shortness of breath) [R06.02] 10/05/2022 Unknown    Chest pain [R07.9] 10/05/2022 Unknown       Patient is homebound due to:  Mass of right lung    Allergies:  Review of patient's allergies indicates:   Allergen Reactions    Lyrica [pregabalin]        Vitals:     Once weekly    Diet: Regular    Acitivities - Up in a chair each morning as tolerated   - Ambulate with assistance to bathroom   - Scheduled walks once each shift (every 8 hours)     - May use walker, cane, or self-propelled wheelchair      LABS:  Per facility protocol     Nursing Precautions:   - Aspiration precautions:             - Total assistance with meals            -  Upright 90 degrees befor during and after meals             -  Suction at bedside          - Fall precautions per nursing home protocol   - Seizure precaution per longterm protocol   - Decubitus  precautions:        -  for positioning   - Pressure reducing foam mattress   - Turn patient every two hours. Use wedge pillows to anchor patient    CONSULTS:      Physical Therapy to evaluate and treat     Occupational Therapy to evaluate and treat       Nutrition to evaluate and recommend diet     Psychiatry to evaluate and follow patients for delirium    MISCELLANEOUS CARE:       Routine Skin for Bedridden Patients:  Apply moisture barrier cream to all    skin folds and wet areas in perineal area daily and after baths and                           all bowel movements.       Medication List        CONTINUE taking these medications      alendronate 70 MG tablet  Commonly known as: FOSAMAX  Take 70 mg by mouth every 7 days.     atorvastatin 10 MG tablet  Commonly known as: LIPITOR  Take 20 mg by mouth once daily.     calcium carbonate 500 mg calcium (1,250 mg) tablet  Commonly known as: OS-JOSÉ ANTONIO  Take 1 tablet by mouth once daily.     diphenoxylate-atropine 2.5-0.025 mg 2.5-0.025 mg per tablet  Commonly known as: LOMOTIL  Take 1 tablet by mouth 2 (two) times daily as needed for Diarrhea.     donepeziL 10 MG tablet  Commonly known as: ARICEPT  Take 10 mg by mouth every evening.     DULoxetine 30 MG capsule  Commonly known as: CYMBALTA  Take 30 mg by mouth nightly.     ergocalciferol 50,000 unit Cap  Commonly known as: ERGOCALCIFEROL  Take 50,000 Units by mouth every 7 days.     EScitalopram oxalate 10 MG tablet  Commonly known as: LEXAPRO  Take 10 mg by mouth once daily.     famotidine 20 MG tablet  Commonly known as: PEPCID  Take 1 tablet (20 mg total) by mouth 2 (two) times daily.     ferrous sulfate 325 (65 FE) MG EC tablet  Take 1 tablet (325 mg total) by mouth once daily.     fexofenadine 60 MG tablet  Commonly known as: ALLEGRA  Take 60 mg by mouth once daily.     fluticasone propionate 50 mcg/actuation nasal spray  Commonly known as: FLONASE  1 spray by Each Nostril route 2 (two) times a day.      gabapentin 100 MG capsule  Commonly known as: NEURONTIN  Take 300 mg by mouth 3 (three) times daily.     glycopyrrolate 1 mg Tab  Commonly known as: ROBINUL  Take 1 mg by mouth 2 (two) times daily.     * HYDROcodone-acetaminophen 7.5-325 mg per tablet  Commonly known as: NORCO  Take 1 tablet by mouth 3 (three) times daily as needed.     hydrOXYzine pamoate 50 MG Cap  Commonly known as: VISTARIL  Take 25 mg by mouth every evening.     ibuprofen 800 MG tablet  Commonly known as: ADVIL,MOTRIN  Take 800 mg by mouth 2 (two) times daily.     levoFLOXacin 750 MG tablet  Commonly known as: LEVAQUIN  Take 750 mg by mouth once daily. For 4 more days     lisinopriL 40 MG tablet  Commonly known as: PRINIVIL,ZESTRIL  Take 40 mg by mouth once daily.     memantine 5 MG Tab  Commonly known as: NAMENDA  Take 5 mg by mouth once daily.     mirtazapine 30 MG tablet  Commonly known as: REMERON  Take 30 mg by mouth every evening.     omeprazole 20 MG capsule  Commonly known as: PRILOSEC  Take 20 mg by mouth once daily.     pentoxifylline 400 mg Tbsr  Commonly known as: TRENTAL  Take 400 mg by mouth 3 (three) times daily with meals.     rOPINIRole 0.25 MG tablet  Commonly known as: REQUIP  Take 0.25 mg by mouth 2 (two) times daily.     TIZANIDINE ORAL  Take 10 mg by mouth every 12 (twelve) hours as needed.     vitamin D 1000 units Tab  Commonly known as: VITAMIN D3  Take 1,000 Units by mouth once daily.           * This list has 1 medication(s) that are the same as other medications prescribed for you. Read the directions carefully, and ask your doctor or other care provider to review them with you.                ASK your doctor about these medications      * HYDROcodone-acetaminophen  mg per tablet  Commonly known as: NORCO  Take 1 tablet by mouth every 4 (four) hours as needed for Pain.           * This list has 1 medication(s) that are the same as other medications prescribed for you. Read the directions carefully, and ask  your doctor or other care provider to review them with you.                        _________________________________  Oscar Doan III, MD  10/07/2022

## 2022-10-07 NOTE — H&P
Inpatient Radiology Pre-procedure Note    History of Present Illness:  Annette Fallon is a 82 y.o. female who presents for CT guided liver bx.  Admission H&P reviewed.  Past Medical History:   Diagnosis Date    Arthritis     Blind in both eyes     Cancer     Colon cancer     Depression     GERD (gastroesophageal reflux disease)     History of radiofrequency ablation procedure for cardiac arrhythmia     Hypertension     Memory loss     Osteoporosis     Palpitations     Port-A-Cath in place 2020    Right    Sleep apnea     Stroke-like symptoms 2021    slurred speech, BLE weakness (Lt greater than Rt)     Past Surgical History:   Procedure Laterality Date    APPENDECTOMY       SECTION      x2    COLON SURGERY  2020    CYSTOSCOPY WITH URETEROSCOPY, RETROGRADE PYELOGRAPHY, AND INSERTION OF STENT Bilateral 2020    Procedure: CYSTOSCOPY, WITH RETROGRADE PYELOGRAM AND URETERAL STENT INSERTION;  Surgeon: Toni Nguyen MD;  Location: [facility] OR;  Service: Urology;  Laterality: Bilateral;    EYE SURGERY Left     RETINA SURGERY OD    ENUCLEATION  OS    FRACTURE SURGERY Left     Hip, hardware present    FRACTURE SURGERY Left     Ankle    HYSTERECTOMY      INSERTION OF TUNNELED CENTRAL VENOUS CATHETER (CVC) WITH SUBCUTANEOUS PORT N/A 2020    Procedure: INSERTION, PORT-A-CATH;  Surgeon: Ranjit Ott III, MD;  Location: [facility] OR;  Service: General;  Laterality: N/A;  PRE-OP BY RN 2020---COVID NEGATIVE ON     KNEE SURGERY Left     LAPAROSCOPIC RIGHT COLON RESECTION Right 2020    Procedure: COLECTOMY, RIGHT, LAPAROSCOPIC;  Surgeon: Ranjit Ott III, MD;  Location: [facility] OR;  Service: General;  Laterality: Right;  COMBINED SURGERY WITH DR. NGUYEN  COVID NEGATIVE 20    ROTATOR CUFF REPAIR      L arm    TONSILLECTOMY         Review of Systems:   As documented in primary team H&P    Home Meds:   Prior to Admission medications    Medication Sig Start Date End Date  Taking? Authorizing Provider   alendronate (FOSAMAX) 70 MG tablet Take 70 mg by mouth every 7 days.   Yes Historical Provider   atorvastatin (LIPITOR) 10 MG tablet Take 20 mg by mouth once daily.    Yes Historical Provider   donepezil (ARICEPT) 10 MG tablet Take 10 mg by mouth every evening.   Yes Historical Provider   ergocalciferol (ERGOCALCIFEROL) 50,000 unit Cap Take 50,000 Units by mouth every 7 days.   Yes Historical Provider   ferrous sulfate 325 (65 FE) MG EC tablet Take 1 tablet (325 mg total) by mouth once daily. 1/16/19  Yes ASHLEY Bethea   fexofenadine (ALLEGRA) 60 MG tablet Take 60 mg by mouth once daily.   Yes Historical Provider   fluticasone propionate (FLONASE) 50 mcg/actuation nasal spray 1 spray by Each Nostril route 2 (two) times a day.   Yes Historical Provider   gabapentin (NEURONTIN) 100 MG capsule Take 300 mg by mouth 3 (three) times daily.    Yes Historical Provider   glycopyrrolate (ROBINUL) 1 mg Tab Take 1 mg by mouth 2 (two) times daily.   Yes Historical Provider   HYDROcodone-acetaminophen (NORCO) 7.5-325 mg per tablet Take 1 tablet by mouth 3 (three) times daily as needed. 2/2/21  Yes Historical Provider   hydrOXYzine pamoate (VISTARIL) 50 MG Cap Take 25 mg by mouth every evening.   Yes Historical Provider   lisinopril (PRINIVIL,ZESTRIL) 40 MG tablet Take 40 mg by mouth once daily.   Yes Historical Provider   memantine (NAMENDA) 5 MG Tab Take 5 mg by mouth once daily.   Yes Historical Provider   mirtazapine (REMERON) 30 MG tablet Take 30 mg by mouth every evening.   Yes Historical Provider   omeprazole (PRILOSEC) 20 MG capsule Take 20 mg by mouth once daily.   Yes Historical Provider   tizanidine HCl (TIZANIDINE ORAL) Take 10 mg by mouth every 12 (twelve) hours as needed.   Yes Historical Provider   calcium carbonate (OS-JOSÉ ANTONIO) 500 mg calcium (1,250 mg) tablet Take 1 tablet by mouth once daily.    Historical Provider   diphenoxylate-atropine 2.5-0.025 mg (LOMOTIL) 2.5-0.025 mg per  tablet Take 1 tablet by mouth 2 (two) times daily as needed for Diarrhea.    Historical Provider   DULoxetine (CYMBALTA) 30 MG capsule Take 30 mg by mouth nightly.    Historical Provider   escitalopram oxalate (LEXAPRO) 10 MG tablet Take 10 mg by mouth once daily.    Historical Provider   famotidine (PEPCID) 20 MG tablet Take 1 tablet (20 mg total) by mouth 2 (two) times daily. 12/26/19 12/25/20  Bogdan Ball MD   HYDROcodone-acetaminophen (NORCO)  mg per tablet Take 1 tablet by mouth every 4 (four) hours as needed for Pain.  Patient not taking: Reported on 10/4/2022 1/5/21   Ankush Lao MD   ibuprofen (ADVIL,MOTRIN) 800 MG tablet Take 800 mg by mouth 2 (two) times daily.    Historical Provider   levoFLOXacin (LEVAQUIN) 750 MG tablet Take 750 mg by mouth once daily.  10/11/22  Historical Provider   pentoxifylline (TRENTAL) 400 mg TbSR Take 400 mg by mouth 3 (three) times daily with meals.    Historical Provider   ropinirole (REQUIP) 0.25 MG tablet Take 0.25 mg by mouth 2 (two) times daily.    Historical Provider   vitamin D (VITAMIN D3) 1000 units Tab Take 1,000 Units by mouth once daily.    Historical Provider     Scheduled Meds:    albuterol-ipratropium  3 mL Nebulization Q6H    aspirin  81 mg Oral Daily    atorvastatin  20 mg Oral Daily    donepeziL  10 mg Oral QHS    DULoxetine  30 mg Oral Nightly    ergocalciferol  50,000 Units Oral Q7 Days    EScitalopram oxalate  10 mg Oral Daily    ferrous sulfate  1 tablet Oral Daily    gabapentin  300 mg Oral TID    glycopyrrolate  1 mg Oral BID    heparin (porcine)  5,000 Units Subcutaneous Q8H    hydrALAZINE  25 mg Oral Q8H    hydrOXYzine pamoate  25 mg Oral QHS    levoFLOXacin  750 mg Intravenous Q24H    lisinopriL  40 mg Oral Daily    memantine  5 mg Oral Daily    mirtazapine  30 mg Oral QHS    pantoprazole  40 mg Oral Daily    pentoxifylline  400 mg Oral TID WM    polyethylene glycol  17 g Oral Daily    potassium bicarbonate  25 mEq Oral BID     rOPINIRole  0.25 mg Oral BID    vitamin D  1,000 Units Oral Daily     Continuous Infusions:   PRN Meds:acetaminophen, albuterol-ipratropium, aluminum-magnesium hydroxide-simethicone, benzonatate, dextrose 10%, dextrose 10%, glucagon (human recombinant), glucose, glucose, insulin aspart U-100, melatonin, morphine, naloxone, ondansetron, oxyCODONE, prochlorperazine, senna-docusate 8.6-50 mg, simethicone, sodium chloride 0.9%, tiZANidine  Anticoagulants/Antiplatelets: no anticoagulation    Allergies:   Review of patient's allergies indicates:   Allergen Reactions    Lyrica [pregabalin]      Sedation Hx: have not been any systemic reactions    Labs:  Recent Labs   Lab 10/04/22  2250   INR 1.1  1.1       Recent Labs   Lab 10/07/22  0351   WBC 7.64   HGB 13.0   HCT 41.1   MCV 83   *      Recent Labs   Lab 10/07/22  0351   *      K 3.4*   CL 90*   CO2 38*   BUN 22   CREATININE 0.7   CALCIUM 7.4*   MG 1.9   ALT 61*   AST 39   ALBUMIN 3.1*   BILITOT 0.9         Vitals:  Temp: 97.8 °F (36.6 °C) (10/07/22 0814)  Pulse: 94 (10/07/22 0814)  Resp: 18 (10/07/22 0814)  BP: 139/74 (10/07/22 0814)  SpO2: (!) 94 % (10/07/22 0814)     Physical Exam:  ASA: 3  Mallampati: 3    General: no acute distress  Mental Status: alert and oriented to person, place and time  HEENT: normocephalic, atraumatic, blind  Chest: unlabored breathing  Heart: irregular heart rate  Abdomen: nondistended  Extremity: moves all extremities    Plan: proceed with CT guided liver bx  Sedation Plan: up to moderate    Rock Galdamez MD  Staff Radiologist  Department of Radiology  Pager: [phone]

## 2022-10-07 NOTE — DISCHARGE SUMMARY
"[facility] Medicine  Discharge Summary      Patient Name: Annette Fallon  MRN: [id number]  Patient Class: IP- Inpatient  Admission Date: 10/4/2022  Hospital Length of Stay: 3 days  Discharge Date and Time:  10/07/2022 3:25 PM  Attending Physician: Oscar Doan III, MD   Discharging Provider: Oscar Doan III, MD  Primary Care Provider: [facility]      HPI:   83 y/o female with a significant medical history of blindness, anxiety/depression, GERD, osteoporosis, malignant Colon CA, HTN, HLD, iron deficiency anemia, neuralgia presents to the hospital for evaluation of back pain, chest pain, SOB, and weakness that began "a few days ago."    The patient resides at [facility] and states she uses a walker at baseline.  She states she has been having severe back pain that has made it increasingly difficult for her to ambulate.  She states when she was using her walker she did not get SOB but she only had to walk a short distance to get to the restroom in her room.  She is not on home oxygen at baseline.  She points to the center of her chest as the area of her pain and describes it "burning".  She denies fever, cough, numbness, N/V/D.      She endorses dysuria, HA, and lightheadedness.  Currently the patient is awake and oriented x 4.  She endorses back pain, 5/10 at this time.  She also has epigastric "burning".  She has movement and sensation in all extremities.  ED workup included an elevated D-dimer (3.25), low K+ (2.3), UA positive for UTI, and an abnormal CTA Chest that revealed a 5.3 x 7.6 x 4.4 cm mass in the right middle and lower lobe along with cirrhotic changes and multiple lesions in the liver and probable chronic compression fractures of T11, 12, and L1.    She was given Dilaudid, Vanc, Zosyn in the ED.  She is satting 95% on 2L NC O2.      * No surgery found *      Hospital Course:   82F with pmh of  blindness, anxiety/depression, GERD, osteoporosis, malignant " "Colon CA (untreated), HTN, HLD, iron deficiency anemia, neuralgia presents to the hospital for evaluation of back pain, chest pain, SOB, and weakness that began "a few days ago."  [facility] and states she uses a walker at baseline.  She states she has been having severe back pain that has made it increasingly difficult for her to ambulate. ED workup included an elevated D-dimer (3.25), low K+ (2.3), UA positive for UTI, and an abnormal CTA Chest that revealed a 5.3 x 7.6 x 4.4 cm mass in the right middle and lower lobe along with cirrhotic changes and multiple lesions in the liver and probable chronic compression fractures of T11, 12, and L1. She was given Dilaudid, Vanc, Zosyn in the ED.  Pt also c/o arm shaking on admission. Pt evaluated by neurology for shaking which appeared to be related to pain. These did resolve with pain medications. Pt was also seen by pulmonology, oncology, and palliative care during her stay. Discussions about potential options for treatment were discussed, but it did not appear pt or family were interested in chemotherapy as pt was unable to tolerate treatment for initial colon cancer. Pt and family did want to get a biopsy of the liver masses in order to identify the cause of her sxs. On further discussions between family, pt, and palliative care the decision was made to have the pt go back to her nursing facility with Hospice. Pt had her biopsy on 10/7 and recovered well after the procedure. Pt discharged back to her nursing facility the same day.       Goals of Care Treatment Preferences:  Code Status: DNR      Consults:   Consults (From admission, onward)        Status Ordering Provider     Inpatient consult to Telemedicine - Oncology  Once        Provider:  Soha Martins MD    Completed SOHA MARTINS     Inpatient consult to Interventional Radiology  Once        Provider:  (Not yet assigned)    Completed PORTILLO PAIGE III     Inpatient consult to " Neurology  Once        Provider:  Marco Antonio Ernst MD    Completed PORTILLO PAIGE III     Inpatient consult to Palliative Care  Once        Provider:  Talia Salcedo NP    Completed CATHLEEN POLLOCK     Inpatient consult to Pulmonology  Once        Provider:  Milton Bright MD    Completed CATHLEEN POLLOCK          No new Assessment & Plan notes have been filed under this hospital service since the last note was generated.  Service: Hospital Medicine    Final Active Diagnoses:    Diagnosis Date Noted POA    PRINCIPAL PROBLEM:  Mass of right lung [R91.8] 03/20/2019 Yes    Postobstructive pneumonia [J18.9] 10/05/2022 Yes    Malignant neoplasm of cecum [C18.0] 12/17/2020 Yes     Chronic    Alcoholic cirrhosis of liver without ascites [K70.30] 10/04/2022 Yes    Cancer-related pain [G89.3] 10/06/2022 Unknown    ACP (advance care planning) [Z71.89] 10/06/2022 Not Applicable    Elevated troponin level not due to acute coronary syndrome [R77.8] 10/05/2022 Yes    Chronic respiratory failure with hypercapnia [J96.12] 10/05/2022 Yes    Hypokalemia [E87.6] 02/24/2021 Yes    Dementia without behavioral disturbance [F03.90] 10/25/2017 Yes     Chronic    Essential hypertension [I10] 10/25/2017 Yes     Chronic    Tobacco abuse [Z72.0] 10/25/2017 Yes     Chronic      Problems Resolved During this Admission:    Diagnosis Date Noted Date Resolved POA    SOB (shortness of breath) [R06.02] 10/05/2022 10/05/2022 Unknown    Chest pain [R07.9] 10/16/2020 10/05/2022 Unknown       Discharged Condition: fair    Disposition: Hospice/Medical Facility    Follow Up:    Patient Instructions:      Diet Adult Regular     Activity as tolerated       Significant Diagnostic Studies: Labs: All labs within the past 24 hours have been reviewed    Pending Diagnostic Studies:     Procedure Component Value Units Date/Time    Specimen to Pathology, Radiology Liver biopsy [[id number]] Collected: 10/07/22 1135    Order Status: Sent Lab  Status: In process Updated: 10/07/22 9033         Medications:  Reconciled Home Medications:      Medication List      CONTINUE taking these medications    alendronate 70 MG tablet  Commonly known as: FOSAMAX  Take 70 mg by mouth every 7 days.     atorvastatin 10 MG tablet  Commonly known as: LIPITOR  Take 20 mg by mouth once daily.     calcium carbonate 500 mg calcium (1,250 mg) tablet  Commonly known as: OS-JOSÉ ANTONIO  Take 1 tablet by mouth once daily.     diphenoxylate-atropine 2.5-0.025 mg 2.5-0.025 mg per tablet  Commonly known as: LOMOTIL  Take 1 tablet by mouth 2 (two) times daily as needed for Diarrhea.     donepeziL 10 MG tablet  Commonly known as: ARICEPT  Take 10 mg by mouth every evening.     DULoxetine 30 MG capsule  Commonly known as: CYMBALTA  Take 30 mg by mouth nightly.     ergocalciferol 50,000 unit Cap  Commonly known as: ERGOCALCIFEROL  Take 50,000 Units by mouth every 7 days.     EScitalopram oxalate 10 MG tablet  Commonly known as: LEXAPRO  Take 10 mg by mouth once daily.     famotidine 20 MG tablet  Commonly known as: PEPCID  Take 1 tablet (20 mg total) by mouth 2 (two) times daily.     ferrous sulfate 325 (65 FE) MG EC tablet  Take 1 tablet (325 mg total) by mouth once daily.     fexofenadine 60 MG tablet  Commonly known as: ALLEGRA  Take 60 mg by mouth once daily.     fluticasone propionate 50 mcg/actuation nasal spray  Commonly known as: FLONASE  1 spray by Each Nostril route 2 (two) times a day.     gabapentin 100 MG capsule  Commonly known as: NEURONTIN  Take 300 mg by mouth 3 (three) times daily.     glycopyrrolate 1 mg Tab  Commonly known as: ROBINUL  Take 1 mg by mouth 2 (two) times daily.     * HYDROcodone-acetaminophen 7.5-325 mg per tablet  Commonly known as: NORCO  Take 1 tablet by mouth 3 (three) times daily as needed.     hydrOXYzine pamoate 50 MG Cap  Commonly known as: VISTARIL  Take 25 mg by mouth every evening.     ibuprofen 800 MG tablet  Commonly known as: ADVIL,MOTRIN  Take 800  mg by mouth 2 (two) times daily.     levoFLOXacin 750 MG tablet  Commonly known as: LEVAQUIN  Take 750 mg by mouth once daily.     lisinopriL 40 MG tablet  Commonly known as: PRINIVIL,ZESTRIL  Take 40 mg by mouth once daily.     memantine 5 MG Tab  Commonly known as: NAMENDA  Take 5 mg by mouth once daily.     mirtazapine 30 MG tablet  Commonly known as: REMERON  Take 30 mg by mouth every evening.     omeprazole 20 MG capsule  Commonly known as: PRILOSEC  Take 20 mg by mouth once daily.     pentoxifylline 400 mg Tbsr  Commonly known as: TRENTAL  Take 400 mg by mouth 3 (three) times daily with meals.     rOPINIRole 0.25 MG tablet  Commonly known as: REQUIP  Take 0.25 mg by mouth 2 (two) times daily.     TIZANIDINE ORAL  Take 10 mg by mouth every 12 (twelve) hours as needed.     vitamin D 1000 units Tab  Commonly known as: VITAMIN D3  Take 1,000 Units by mouth once daily.         * This list has 1 medication(s) that are the same as other medications prescribed for you. Read the directions carefully, and ask your doctor or other care provider to review them with you.            ASK your doctor about these medications    * HYDROcodone-acetaminophen  mg per tablet  Commonly known as: NORCO  Take 1 tablet by mouth every 4 (four) hours as needed for Pain.         * This list has 1 medication(s) that are the same as other medications prescribed for you. Read the directions carefully, and ask your doctor or other care provider to review them with you.                Indwelling Lines/Drains at time of discharge:   Lines/Drains/Airways     None                 Time spent on the discharge of patient: >35 minutes         Oscar Doan III, MD  Department of Hospital Medicine  [facility]

## 2022-10-07 NOTE — NURSING
Report called to [facility] accepted by Laurie. Awaiting arrival of transportation.

## 2022-10-07 NOTE — PLAN OF CARE
[facility] - Telemetry  Discharge Final Note    Primary Care Provider: Willa Friedman    Expected Discharge Date: 10/7/2022    All needs met. SW notified  that patient is returning to [facility] with [facility]. SW notified nurse Dora that patient is ready for discharge from case management standpoint.     Call Report to [phone] ask for nurse Laurie. Patient is going to Room 10A.     ADT 30 order placed for Van Transportation.  Requested  time: 3:30 pm Ambulance  If transportation does not arrive at ETA time nurse will be instructed to follow protocol for transportation below:  How can I get in touch directly with dispatch, if needed?                 Non-emergent dispatch: [phone]      +++NURSING:  If Van does not arrive at requested time please call the above Non Emergent Dispatcher.  If issue not resolved please escalate to your charge nurse for further instructions.      Final Discharge Note (most recent)       Final Note - 10/07/22 1530          Final Note    Assessment Type Final Discharge Note     Anticipated Discharge Disposition [facility]     What phone number can be called within the next 1-3 days to see how you are doing after discharge? [phone]        Post-Acute Status    Post-Acute Authorization Hospice;Placement   Nursing Home    Post-Acute Placement Status Set-up Complete/Auth obtained     Hospice Status Set-up Complete/Auth obtained     Discharge Delays None known at this time                     Important Message from Medicare  Important Message from Medicare regarding Discharge Appeal Rights: Given to patient/caregiver, Explained to patient/caregiver, Other (comments) (Explained IMM to .  expressed understanding. Copy emailed to [email or web address])     Date IMM was signed: 10/07/22  Time IMM was signed: 3446

## 2022-10-07 NOTE — HOSPITAL COURSE
"82F with pmh of  blindness, anxiety/depression, GERD, osteoporosis, malignant Colon CA (untreated), HTN, HLD, iron deficiency anemia, neuralgia presents to the hospital for evaluation of back pain, chest pain, SOB, and weakness that began "a few days ago."  [facility] and states she uses a walker at baseline.  She states she has been having severe back pain that has made it increasingly difficult for her to ambulate. ED workup included an elevated D-dimer (3.25), low K+ (2.3), UA positive for UTI, and an abnormal CTA Chest that revealed a 5.3 x 7.6 x 4.4 cm mass in the right middle and lower lobe along with cirrhotic changes and multiple lesions in the liver and probable chronic compression fractures of T11, 12, and L1. She was given Dilaudid, Vanc, Zosyn in the ED.  Pt also c/o arm shaking on admission. Pt evaluated by neurology for shaking which appeared to be related to pain. These did resolve with pain medications. Pt was also seen by pulmonology, oncology, and palliative care during her stay. Discussions about potential options for treatment were discussed, but it did not appear pt or family were interested in chemotherapy as pt was unable to tolerate treatment for initial colon cancer. Pt and family did want to get a biopsy of the liver masses in order to identify the cause of her sxs. On further discussions between family, pt, and palliative care the decision was made to have the pt go back to her nursing facility with Hospice. Pt had her biopsy on 10/7 and recovered well after the procedure. Pt discharged back to her nursing facility the same day.  "

## 2022-10-07 NOTE — PROCEDURES
Radiology Post-Procedure Note    Pre Op Diagnosis: bilateral liver masses, hx of colon cancer    Post Op Diagnosis: same     Procedure: left liver biopsy    Procedure performed by: Rock Galdamez MD    Written Informed Consent Obtained: Yes    Specimen Removed: YES 3 cores    Estimated Blood Loss: Minimal    Findings:   Using CT guidance a 19g sheath needle was placed into a left liver mass. 20g monopty biopsy gun used to take 3 core biopsy samples. Specimen sent to pathology.     Patient tolerated procedure well.    Rock Galdamez MD  Staff Radiologist  Department of Radiology  Pager: [phone]

## 2022-10-07 NOTE — NURSING
Patient awake and alert no complaint of pain at present. Plan of care reviewed. Patient informed to remain NPO for Procedure.

## 2022-10-07 NOTE — PLAN OF CARE
10/07/22 1305   Medicare Message   Important Message from Medicare regarding Discharge Appeal Rights Given to patient/caregiver;Explained to patient/caregiver;Other (comments)  (Explained IMM to .  expressed understanding. Copy emailed to [email or web address])   Date IMM was signed 10/07/22   Time IMM was signed [id number]

## 2022-10-07 NOTE — NURSING
Call placed to dispatch concerning patient's transportation.Dispatcher states she will call [facility] to get ETA.

## 2022-10-24 ENCOUNTER — TELEPHONE (OUTPATIENT)
Dept: HEMATOLOGY/ONCOLOGY | Facility: CLINIC | Age: 82
End: 2022-10-24
Payer: MEDICARE

## 2023-04-25 NOTE — NURSING
Chart review completed for the following sections:  • Recent Vital Signs  • Allergies/Contradictions  • Medication Review   • History   • SDOH   • Problem List  • Immunizations  • Past hospitalizations and major procedures, including surgery  • Significant past illnesses and treatment history including: History and Physical, Other provider notes, Medications/Infusions/Transfusions, Surgery and Diet/Fluid restrictions  • Relevant past medications related to the patient's condition      Patient has [facility] dx and DM2   Patients most recent  A1C was 5 5 on 3/17/23, therefore does not qualify for BYP  BYP and SW episodes closed and this RNCM removed self and Richardson JANSEN SWCM from care team  Note routed to [facility]  Pt complaint of pain 6/10 pain scale, prn pain med given. Will cont to monitor

## 2023-07-02 NOTE — INTERVAL H&P NOTE
The patient has been examined and the H&P has been reviewed:    I concur with the findings and no changes have occurred since H&P was written.    Surgery risks, benefits and alternative options discussed and understood by patient/family.          Active Hospital Problems    Diagnosis  POA    Cancer of cecum [C18.0]  Yes      Resolved Hospital Problems   No resolved problems to display.      Patient/EMS

## 2024-02-16 NOTE — PLAN OF CARE
02/16/24 1541   Team Meeting   Meeting Type Daily Rounds   Team Members Present   Team Members Present Physician;Nurse;   Physician Team Member Sabrina   Nursing Team Member Alta   Care Management Team Member Javad   Patient/Family Present   Patient Present No   Patient's Family Present No     Patient currently holds 201 status. Patient denies all symptoms. Patient is medication and meal compliant. Patient to continue on all current scheduled medications. Patient discharge date and time is to be determined.    Pre-operative instructions, medication directives and pain scales reviewed with patient. All questions the patient had  were answered. Re-assurance about surgical procedure and day of surgery routine given as needed. The patient verbalized understanding of the pre-op instructions.

## 2024-12-10 NOTE — PLAN OF CARE
Patient of Dr. Lyons last seen in the [place] office called in with concerns of dysuria that started last night as well as urinary frequency and incontinence. Denies any fevers or hematuria. Patient has a history of UTI's. Advised to increase water intake and avoid bladder irritants. ED precautions reviewed. Placed urine orders to rule out infection.       Reason for Disposition   The patient has an unknown case of mild dysuria    Answer Assessment - Initial Assessment Questions  1. When did your symptoms start?   Last night   2. Do you experience pain or burning with every void?   Yes  3. Do you have any other urinary symptoms such as urinary frequency, urgency, incontinence, blood in your urine?   Incontinence, frequency   4. Do you have any abdominal pain or flank pain?  No   5. Do you have a fever of 101 or higher? How did you take your temperature?   No   8. Do you have a history of urinary tract infections?   Yes    Protocols used: Urology-Dysuria-ADULT-[place]     Problem: Patient Care Overview  Goal: Plan of Care Review  Outcome: Ongoing (interventions implemented as appropriate)   10/27/17 0577   Coping/Psychosocial   Plan Of Care Reviewed With patient   A+OX4. Pt free from falls, injury, and trauma. VSS. Pt is blind in both eyes. Medications given. Pain controled with prn pain meds. Pt complained of a sore throat which was controled with spray. No acute distress noted. Will continue to monitor

## (undated) DEVICE — APPLIER CLIP ENDO MED/LG 10MM

## (undated) DEVICE — PAD PREP 50/CA

## (undated) DEVICE — APPLICATOR CHLORAPREP ORN 26ML

## (undated) DEVICE — SEALER LIGASURE LAP 37CM 5MM

## (undated) DEVICE — TROCAR KII BLLN 12MM 10CM

## (undated) DEVICE — DRAPE C-ARM FOR MOBILE XRAY

## (undated) DEVICE — SOL SOD CHLORIDE 0.9% 10ML

## (undated) DEVICE — SEE L#120831

## (undated) DEVICE — FOAM APP FILM BARRIER NO STING

## (undated) DEVICE — GLOVE BIOGEL PI MICRO SZ 7

## (undated) DEVICE — SUT VICRYL 3-0 27 SH

## (undated) DEVICE — CLOSURE SKIN STERI STRIP 1/2X4

## (undated) DEVICE — SOL NACL 0.9% INJ 250ML BG

## (undated) DEVICE — SUT VICRYL CTD 2-0 GI 27 SH

## (undated) DEVICE — SOL 9P NACL IRR PIC IL

## (undated) DEVICE — SEE MEDLINE ITEM 152532

## (undated) DEVICE — CANISTER SUCTION 2 LTR

## (undated) DEVICE — TROCAR ENDOPATH XCEL 11MM 10CM

## (undated) DEVICE — SEE MEDLINE ITEM 107746

## (undated) DEVICE — BLANKET LOWER BODY 55.9X40.2IN

## (undated) DEVICE — GAUZE SPONGE 4X4 12PLY

## (undated) DEVICE — NDL HYPO REG 25G X 1 1/2

## (undated) DEVICE — SCISSOR CURVED ENDOPATH 5MM

## (undated) DEVICE — SET DECANTER MEDICHOICE

## (undated) DEVICE — BLADE SURG CARBON STEEL SZ11

## (undated) DEVICE — SOL CLEARIFY VISUALIZATION LAP

## (undated) DEVICE — Device

## (undated) DEVICE — PACK ENDOSCOPY GENERAL

## (undated) DEVICE — SUT VICRYL 4-0 27 PS-1 27IN

## (undated) DEVICE — KIT GELPORT LAPAROSCOPIC ABD

## (undated) DEVICE — SUT SILK 3-0 BLK BR SH 30IN

## (undated) DEVICE — SUT VICRYL PLUS 3-0 SH 18IN

## (undated) DEVICE — STAPLER SKIN ROTATING HEAD

## (undated) DEVICE — SEE MEDLINE ITEM 157110

## (undated) DEVICE — KIT ANTIFOG

## (undated) DEVICE — SLEEVE SCD EXPRESS CALF MEDIUM

## (undated) DEVICE — GLOVE SURGICAL LATEX SZ 7

## (undated) DEVICE — SUT VICRYL+ 1 CT1 18IN

## (undated) DEVICE — CATH URTRL OPEN END STR TIP 5F

## (undated) DEVICE — IRRIGATOR ENDOSCOPY DISP.

## (undated) DEVICE — SPONGE LAP 18X18 PREWASHED

## (undated) DEVICE — SOL NS 1000CC

## (undated) DEVICE — SEE MEDLINE ITEM 152622

## (undated) DEVICE — POSITIONER HEAD DONUT 9IN FOAM

## (undated) DEVICE — RELOAD PROXIMATE CUT BLUE 75MM

## (undated) DEVICE — TRAY FOLEY 16FR INFECTION CONT

## (undated) DEVICE — CUTTER PROXIMATE 100MM GRN

## (undated) DEVICE — DRESSING TRANS 4X4 TEGADERM

## (undated) DEVICE — TROCAR ENDOPATH XCEL 5X100MM

## (undated) DEVICE — SUPPORT ULNA NERVE PROTECTOR

## (undated) DEVICE — BOOT SUTURE AID

## (undated) DEVICE — NDL 18GA X1 1/2 REG BEVEL

## (undated) DEVICE — SYR 10CC LUER LOCK

## (undated) DEVICE — SYR 3CC LUER LOC

## (undated) DEVICE — SYR ONLY LUER LOCK 20CC

## (undated) DEVICE — DRESSING ADHESIVE ISLAND 3 X 6

## (undated) DEVICE — JELLY LUBRICANT STERILE 4 OZ

## (undated) DEVICE — SUT VICRYL PLUS ANTIBACT

## (undated) DEVICE — CUTTER PROXIMATE BLUE 75MM

## (undated) DEVICE — ELECTRODE REM PLYHSV RETURN 9

## (undated) DEVICE — TUBING INSUFFLATION 10

## (undated) DEVICE — SUT SILK 3-0 SH 18IN BLACK

## (undated) DEVICE — MAT QUICK 40X30 FLOOR FLUID LF

## (undated) DEVICE — SEE MEDLINE ITEM 146292

## (undated) DEVICE — BLANKET UPPER BODY 78.7X29.9IN

## (undated) DEVICE — SOL IRR STRL WATER 500ML

## (undated) DEVICE — SHEET THYROID W/ISO-BAC

## (undated) DEVICE — COVER OVERHEAD SURG LT BLUE